# Patient Record
Sex: MALE | Race: WHITE | Employment: UNEMPLOYED | URBAN - METROPOLITAN AREA
[De-identification: names, ages, dates, MRNs, and addresses within clinical notes are randomized per-mention and may not be internally consistent; named-entity substitution may affect disease eponyms.]

---

## 2018-01-19 ENCOUNTER — HOSPITAL ENCOUNTER (OUTPATIENT)
Dept: GENERAL RADIOLOGY | Age: 68
Discharge: HOME | End: 2018-01-19
Payer: COMMERCIAL

## 2018-01-19 DIAGNOSIS — C83.39 DIFFUSE LARGE B-CELL LYMPHOMA OF EXTRANODAL SITE (HCC): ICD-10-CM

## 2018-01-19 DIAGNOSIS — C83.30 DIFFUSE LARGE CELL NON-HODGKIN'S LYMPHOMA (HCC): ICD-10-CM

## 2018-01-19 PROCEDURE — 71046 X-RAY EXAM CHEST 2 VIEWS: CPT

## 2020-10-17 ENCOUNTER — APPOINTMENT (OUTPATIENT)
Dept: GENERAL RADIOLOGY | Age: 70
DRG: 286 | End: 2020-10-17
Attending: EMERGENCY MEDICINE
Payer: MEDICARE

## 2020-10-17 ENCOUNTER — HOSPITAL ENCOUNTER (INPATIENT)
Age: 70
LOS: 3 days | Discharge: HOME | DRG: 286 | End: 2020-10-20
Attending: EMERGENCY MEDICINE | Admitting: INTERNAL MEDICINE
Payer: MEDICARE

## 2020-10-17 DIAGNOSIS — I50.9 CONGESTIVE HEART FAILURE, UNSPECIFIED HF CHRONICITY, UNSPECIFIED HEART FAILURE TYPE (HCC): Primary | ICD-10-CM

## 2020-10-17 DIAGNOSIS — I50.21 ACUTE SYSTOLIC CONGESTIVE HEART FAILURE (HCC): ICD-10-CM

## 2020-10-17 DIAGNOSIS — R06.09 DYSPNEA ON EXERTION: ICD-10-CM

## 2020-10-17 DIAGNOSIS — R06.02 SOB (SHORTNESS OF BREATH): ICD-10-CM

## 2020-10-17 PROBLEM — I48.91 NEW ONSET ATRIAL FIBRILLATION (HCC): Status: ACTIVE | Noted: 2020-10-17

## 2020-10-17 LAB
ALBUMIN SERPL-MCNC: 3.6 G/DL (ref 3.4–5)
ALBUMIN SERPL-MCNC: 3.6 G/DL (ref 3.4–5)
ALBUMIN/GLOB SERPL: 0.9 {RATIO} (ref 0.8–1.7)
ALBUMIN/GLOB SERPL: 0.9 {RATIO} (ref 0.8–1.7)
ALP SERPL-CCNC: 101 U/L (ref 45–117)
ALP SERPL-CCNC: 91 U/L (ref 45–117)
ALT SERPL-CCNC: 27 U/L (ref 16–61)
ALT SERPL-CCNC: 29 U/L (ref 16–61)
ANION GAP SERPL CALC-SCNC: 7 MMOL/L (ref 3–18)
ANION GAP SERPL CALC-SCNC: 7 MMOL/L (ref 3–18)
APTT PPP: 32.6 SEC (ref 23–36.4)
APTT PPP: 39.7 SEC (ref 23–36.4)
AST SERPL-CCNC: 20 U/L (ref 10–38)
AST SERPL-CCNC: 23 U/L (ref 10–38)
ATRIAL RATE: 108 BPM
BASOPHILS # BLD: 0.1 K/UL (ref 0–0.1)
BASOPHILS # BLD: 0.1 K/UL (ref 0–0.1)
BASOPHILS NFR BLD: 1 % (ref 0–2)
BASOPHILS NFR BLD: 1 % (ref 0–2)
BILIRUB SERPL-MCNC: 0.5 MG/DL (ref 0.2–1)
BILIRUB SERPL-MCNC: 1 MG/DL (ref 0.2–1)
BNP SERPL-MCNC: 4266 PG/ML (ref 0–900)
BUN SERPL-MCNC: 16 MG/DL (ref 7–18)
BUN SERPL-MCNC: 17 MG/DL (ref 7–18)
BUN/CREAT SERPL: 13 (ref 12–20)
BUN/CREAT SERPL: 16 (ref 12–20)
CALCIUM SERPL-MCNC: 9 MG/DL (ref 8.5–10.1)
CALCIUM SERPL-MCNC: 9.1 MG/DL (ref 8.5–10.1)
CALCULATED R AXIS, ECG10: -43 DEGREES
CALCULATED T AXIS, ECG11: 99 DEGREES
CHLORIDE SERPL-SCNC: 107 MMOL/L (ref 100–111)
CHLORIDE SERPL-SCNC: 111 MMOL/L (ref 100–111)
CK MB CFR SERPL CALC: 1.7 % (ref 0–4)
CK MB SERPL-MCNC: 1.8 NG/ML (ref 5–25)
CK SERPL-CCNC: 108 U/L (ref 39–308)
CO2 SERPL-SCNC: 23 MMOL/L (ref 21–32)
CO2 SERPL-SCNC: 25 MMOL/L (ref 21–32)
CREAT SERPL-MCNC: 1.07 MG/DL (ref 0.6–1.3)
CREAT SERPL-MCNC: 1.2 MG/DL (ref 0.6–1.3)
D DIMER PPP FEU-MCNC: 0.77 UG/ML(FEU)
DIAGNOSIS, 93000: NORMAL
DIFFERENTIAL METHOD BLD: ABNORMAL
DIFFERENTIAL METHOD BLD: ABNORMAL
EOSINOPHIL # BLD: 0.3 K/UL (ref 0–0.4)
EOSINOPHIL # BLD: 0.5 K/UL (ref 0–0.4)
EOSINOPHIL NFR BLD: 3 % (ref 0–5)
EOSINOPHIL NFR BLD: 4 % (ref 0–5)
ERYTHROCYTE [DISTWIDTH] IN BLOOD BY AUTOMATED COUNT: 14.2 % (ref 11.6–14.5)
ERYTHROCYTE [DISTWIDTH] IN BLOOD BY AUTOMATED COUNT: 14.3 % (ref 11.6–14.5)
FLUAV AG NPH QL IA: NEGATIVE
FLUBV AG NOSE QL IA: NEGATIVE
FOLATE SERPL-MCNC: >20 NG/ML (ref 3.1–17.5)
GLOBULIN SER CALC-MCNC: 4 G/DL (ref 2–4)
GLOBULIN SER CALC-MCNC: 4.1 G/DL (ref 2–4)
GLUCOSE SERPL-MCNC: 101 MG/DL (ref 74–99)
GLUCOSE SERPL-MCNC: 133 MG/DL (ref 74–99)
HCT VFR BLD AUTO: 45.6 % (ref 36–48)
HCT VFR BLD AUTO: 45.9 % (ref 36–48)
HGB BLD-MCNC: 15.8 G/DL (ref 13–16)
HGB BLD-MCNC: 16.2 G/DL (ref 13–16)
LYMPHOCYTES # BLD: 2.2 K/UL (ref 0.9–3.6)
LYMPHOCYTES # BLD: 4.3 K/UL (ref 0.9–3.6)
LYMPHOCYTES NFR BLD: 23 % (ref 21–52)
LYMPHOCYTES NFR BLD: 39 % (ref 21–52)
MAGNESIUM SERPL-MCNC: 1.9 MG/DL (ref 1.6–2.6)
MAGNESIUM SERPL-MCNC: 2 MG/DL (ref 1.6–2.6)
MCH RBC QN AUTO: 31.9 PG (ref 24–34)
MCH RBC QN AUTO: 32.4 PG (ref 24–34)
MCHC RBC AUTO-ENTMCNC: 34.6 G/DL (ref 31–37)
MCHC RBC AUTO-ENTMCNC: 35.3 G/DL (ref 31–37)
MCV RBC AUTO: 91.8 FL (ref 74–97)
MCV RBC AUTO: 92.1 FL (ref 74–97)
MONOCYTES # BLD: 0.6 K/UL (ref 0.05–1.2)
MONOCYTES # BLD: 0.8 K/UL (ref 0.05–1.2)
MONOCYTES NFR BLD: 7 % (ref 3–10)
MONOCYTES NFR BLD: 7 % (ref 3–10)
NEUTS SEG # BLD: 5.4 K/UL (ref 1.8–8)
NEUTS SEG # BLD: 6.4 K/UL (ref 1.8–8)
NEUTS SEG NFR BLD: 49 % (ref 40–73)
NEUTS SEG NFR BLD: 66 % (ref 40–73)
PHOSPHATE SERPL-MCNC: 2.6 MG/DL (ref 2.5–4.9)
PLATELET # BLD AUTO: 204 K/UL (ref 135–420)
PLATELET # BLD AUTO: 208 K/UL (ref 135–420)
PMV BLD AUTO: 11.8 FL (ref 9.2–11.8)
PMV BLD AUTO: 12.3 FL (ref 9.2–11.8)
POTASSIUM SERPL-SCNC: 3.7 MMOL/L (ref 3.5–5.5)
POTASSIUM SERPL-SCNC: 4 MMOL/L (ref 3.5–5.5)
PROCALCITONIN SERPL-MCNC: <0.05 NG/ML
PROT SERPL-MCNC: 7.6 G/DL (ref 6.4–8.2)
PROT SERPL-MCNC: 7.7 G/DL (ref 6.4–8.2)
Q-T INTERVAL, ECG07: 372 MS
QRS DURATION, ECG06: 124 MS
QTC CALCULATION (BEZET), ECG08: 496 MS
RBC # BLD AUTO: 4.95 M/UL (ref 4.7–5.5)
RBC # BLD AUTO: 5 M/UL (ref 4.7–5.5)
SODIUM SERPL-SCNC: 139 MMOL/L (ref 136–145)
SODIUM SERPL-SCNC: 141 MMOL/L (ref 136–145)
T4 FREE SERPL-MCNC: 1 NG/DL (ref 0.7–1.5)
TROPONIN I SERPL-MCNC: <0.02 NG/ML (ref 0–0.04)
TROPONIN I SERPL-MCNC: <0.02 NG/ML (ref 0–0.04)
TSH SERPL DL<=0.05 MIU/L-ACNC: 1.85 UIU/ML (ref 0.36–3.74)
TSH SERPL DL<=0.05 MIU/L-ACNC: 3.28 UIU/ML (ref 0.36–3.74)
VENTRICULAR RATE, ECG03: 107 BPM
VIT B12 SERPL-MCNC: 604 PG/ML (ref 211–911)
WBC # BLD AUTO: 11 K/UL (ref 4.6–13.2)
WBC # BLD AUTO: 9.6 K/UL (ref 4.6–13.2)

## 2020-10-17 PROCEDURE — 74011250637 HC RX REV CODE- 250/637: Performed by: INTERNAL MEDICINE

## 2020-10-17 PROCEDURE — 71045 X-RAY EXAM CHEST 1 VIEW: CPT

## 2020-10-17 PROCEDURE — 84439 ASSAY OF FREE THYROXINE: CPT

## 2020-10-17 PROCEDURE — 80053 COMPREHEN METABOLIC PANEL: CPT

## 2020-10-17 PROCEDURE — 2709999900 HC NON-CHARGEABLE SUPPLY

## 2020-10-17 PROCEDURE — 99222 1ST HOSP IP/OBS MODERATE 55: CPT | Performed by: INTERNAL MEDICINE

## 2020-10-17 PROCEDURE — 84443 ASSAY THYROID STIM HORMONE: CPT

## 2020-10-17 PROCEDURE — 65270000029 HC RM PRIVATE

## 2020-10-17 PROCEDURE — 94762 N-INVAS EAR/PLS OXIMTRY CONT: CPT

## 2020-10-17 PROCEDURE — 83880 ASSAY OF NATRIURETIC PEPTIDE: CPT

## 2020-10-17 PROCEDURE — 84145 PROCALCITONIN (PCT): CPT

## 2020-10-17 PROCEDURE — 85730 THROMBOPLASTIN TIME PARTIAL: CPT

## 2020-10-17 PROCEDURE — 83735 ASSAY OF MAGNESIUM: CPT

## 2020-10-17 PROCEDURE — 74011250636 HC RX REV CODE- 250/636

## 2020-10-17 PROCEDURE — 99284 EMERGENCY DEPT VISIT MOD MDM: CPT

## 2020-10-17 PROCEDURE — 93005 ELECTROCARDIOGRAM TRACING: CPT

## 2020-10-17 PROCEDURE — 99218 HC RM OBSERVATION: CPT

## 2020-10-17 PROCEDURE — 99232 SBSQ HOSP IP/OBS MODERATE 35: CPT | Performed by: HOSPITALIST

## 2020-10-17 PROCEDURE — 74011250637 HC RX REV CODE- 250/637: Performed by: PHYSICIAN ASSISTANT

## 2020-10-17 PROCEDURE — 74011250636 HC RX REV CODE- 250/636: Performed by: PHYSICIAN ASSISTANT

## 2020-10-17 PROCEDURE — 74011250636 HC RX REV CODE- 250/636: Performed by: INTERNAL MEDICINE

## 2020-10-17 PROCEDURE — 82607 VITAMIN B-12: CPT

## 2020-10-17 PROCEDURE — 84100 ASSAY OF PHOSPHORUS: CPT

## 2020-10-17 PROCEDURE — 85379 FIBRIN DEGRADATION QUANT: CPT

## 2020-10-17 PROCEDURE — 96374 THER/PROPH/DIAG INJ IV PUSH: CPT

## 2020-10-17 PROCEDURE — 85025 COMPLETE CBC W/AUTO DIFF WBC: CPT

## 2020-10-17 PROCEDURE — 82550 ASSAY OF CK (CPK): CPT

## 2020-10-17 PROCEDURE — 36415 COLL VENOUS BLD VENIPUNCTURE: CPT

## 2020-10-17 PROCEDURE — 87804 INFLUENZA ASSAY W/OPTIC: CPT

## 2020-10-17 PROCEDURE — 74011250636 HC RX REV CODE- 250/636: Performed by: STUDENT IN AN ORGANIZED HEALTH CARE EDUCATION/TRAINING PROGRAM

## 2020-10-17 RX ORDER — HEPARIN SODIUM 10000 [USP'U]/100ML
9.5-25 INJECTION, SOLUTION INTRAVENOUS
Status: DISCONTINUED | OUTPATIENT
Start: 2020-10-17 | End: 2020-10-19

## 2020-10-17 RX ORDER — FUROSEMIDE 10 MG/ML
80 INJECTION INTRAMUSCULAR; INTRAVENOUS ONCE
Status: COMPLETED | OUTPATIENT
Start: 2020-10-17 | End: 2020-10-17

## 2020-10-17 RX ORDER — POLYETHYLENE GLYCOL 3350 17 G/17G
17 POWDER, FOR SOLUTION ORAL DAILY PRN
Status: DISCONTINUED | OUTPATIENT
Start: 2020-10-17 | End: 2020-10-21 | Stop reason: HOSPADM

## 2020-10-17 RX ORDER — FUROSEMIDE 20 MG/1
20 TABLET ORAL DAILY
Status: DISCONTINUED | OUTPATIENT
Start: 2020-10-17 | End: 2020-10-17

## 2020-10-17 RX ORDER — METOPROLOL TARTRATE 25 MG/1
12.5 TABLET, FILM COATED ORAL EVERY 12 HOURS
Status: DISCONTINUED | OUTPATIENT
Start: 2020-10-17 | End: 2020-10-18

## 2020-10-17 RX ORDER — FUROSEMIDE 10 MG/ML
INJECTION INTRAMUSCULAR; INTRAVENOUS
Status: COMPLETED
Start: 2020-10-17 | End: 2020-10-17

## 2020-10-17 RX ORDER — PROMETHAZINE HYDROCHLORIDE 25 MG/1
12.5 TABLET ORAL
Status: DISCONTINUED | OUTPATIENT
Start: 2020-10-17 | End: 2020-10-17

## 2020-10-17 RX ORDER — HEPARIN SODIUM 1000 [USP'U]/ML
3000 INJECTION, SOLUTION INTRAVENOUS; SUBCUTANEOUS ONCE
Status: COMPLETED | OUTPATIENT
Start: 2020-10-17 | End: 2020-10-17

## 2020-10-17 RX ORDER — FUROSEMIDE 10 MG/ML
20 INJECTION INTRAMUSCULAR; INTRAVENOUS EVERY 12 HOURS
Status: DISCONTINUED | OUTPATIENT
Start: 2020-10-17 | End: 2020-10-18

## 2020-10-17 RX ORDER — ATORVASTATIN CALCIUM 20 MG/1
20 TABLET, FILM COATED ORAL
Status: DISCONTINUED | OUTPATIENT
Start: 2020-10-17 | End: 2020-10-21 | Stop reason: HOSPADM

## 2020-10-17 RX ORDER — FUROSEMIDE 10 MG/ML
40 INJECTION INTRAMUSCULAR; INTRAVENOUS ONCE
Status: COMPLETED | OUTPATIENT
Start: 2020-10-17 | End: 2020-10-17

## 2020-10-17 RX ORDER — SODIUM CHLORIDE 0.9 % (FLUSH) 0.9 %
5-40 SYRINGE (ML) INJECTION AS NEEDED
Status: DISCONTINUED | OUTPATIENT
Start: 2020-10-17 | End: 2020-10-21 | Stop reason: HOSPADM

## 2020-10-17 RX ORDER — SODIUM CHLORIDE 0.9 % (FLUSH) 0.9 %
5-40 SYRINGE (ML) INJECTION EVERY 8 HOURS
Status: DISCONTINUED | OUTPATIENT
Start: 2020-10-17 | End: 2020-10-21 | Stop reason: HOSPADM

## 2020-10-17 RX ORDER — PANTOPRAZOLE SODIUM 40 MG/1
40 TABLET, DELAYED RELEASE ORAL DAILY
Status: DISCONTINUED | OUTPATIENT
Start: 2020-10-17 | End: 2020-10-21 | Stop reason: HOSPADM

## 2020-10-17 RX ORDER — ENOXAPARIN SODIUM 100 MG/ML
40 INJECTION SUBCUTANEOUS DAILY
Status: DISCONTINUED | OUTPATIENT
Start: 2020-10-17 | End: 2020-10-17

## 2020-10-17 RX ORDER — GUAIFENESIN 100 MG/5ML
81 LIQUID (ML) ORAL DAILY
Status: DISCONTINUED | OUTPATIENT
Start: 2020-10-17 | End: 2020-10-21 | Stop reason: HOSPADM

## 2020-10-17 RX ORDER — ONDANSETRON 2 MG/ML
4 INJECTION INTRAMUSCULAR; INTRAVENOUS
Status: DISCONTINUED | OUTPATIENT
Start: 2020-10-17 | End: 2020-10-21 | Stop reason: HOSPADM

## 2020-10-17 RX ADMIN — Medication 10 ML: at 10:08

## 2020-10-17 RX ADMIN — HEPARIN SODIUM 990.85 UNITS/HR: 10000 INJECTION, SOLUTION INTRAVENOUS at 13:34

## 2020-10-17 RX ADMIN — FUROSEMIDE 40 MG: 10 INJECTION, SOLUTION INTRAMUSCULAR; INTRAVENOUS at 03:04

## 2020-10-17 RX ADMIN — METOPROLOL TARTRATE 12.5 MG: 25 TABLET, FILM COATED ORAL at 22:19

## 2020-10-17 RX ADMIN — PANTOPRAZOLE SODIUM 40 MG: 40 TABLET, DELAYED RELEASE ORAL at 10:07

## 2020-10-17 RX ADMIN — FUROSEMIDE 40 MG: 10 INJECTION, SOLUTION INTRAMUSCULAR; INTRAVENOUS at 03:03

## 2020-10-17 RX ADMIN — METOPROLOL TARTRATE 12.5 MG: 25 TABLET, FILM COATED ORAL at 13:20

## 2020-10-17 RX ADMIN — FUROSEMIDE 20 MG: 10 INJECTION, SOLUTION INTRAMUSCULAR; INTRAVENOUS at 13:23

## 2020-10-17 RX ADMIN — ASPIRIN 81 MG CHEWABLE TABLET 81 MG: 81 TABLET CHEWABLE at 10:07

## 2020-10-17 RX ADMIN — HEPARIN SODIUM 3000 UNITS: 1000 INJECTION INTRAVENOUS; SUBCUTANEOUS at 22:22

## 2020-10-17 RX ADMIN — Medication 10 ML: at 22:19

## 2020-10-17 RX ADMIN — FUROSEMIDE 80 MG: 10 INJECTION, SOLUTION INTRAMUSCULAR; INTRAVENOUS at 10:07

## 2020-10-17 RX ADMIN — ENOXAPARIN SODIUM 40 MG: 40 INJECTION SUBCUTANEOUS at 10:07

## 2020-10-17 RX ADMIN — FUROSEMIDE 20 MG: 10 INJECTION, SOLUTION INTRAMUSCULAR; INTRAVENOUS at 22:19

## 2020-10-17 RX ADMIN — Medication 10 ML: at 13:24

## 2020-10-17 RX ADMIN — ATORVASTATIN CALCIUM 20 MG: 20 TABLET, FILM COATED ORAL at 22:19

## 2020-10-17 NOTE — CONSULTS
[facility] - Consult Note    Consultation request by Osmin Lugo MD for advice/opinion related to evaluating New onset atrial fibrillation [facility]) [I48.91]  Acute congestive heart failure ([address] [I50.9]  CHF exacerbation ([address] [I50.9]    Date of  Admission: 10/17/2020 12:31 AM   Primary Care Physician:  None     Assessment:     Patient Active Problem List   Diagnosis Code    Hepatitis C B19.20    Abdominal pain R10.9    Hematemesis K92.0    Anemia D64.9    Lymphoma ([facility] C85.90    Acute congestive heart failure ([address] I50.9    New onset atrial fibrillation ([facility] I48.91    Shortness of breath R06.02    CHF exacerbation ([facility] I50.9     -Acute CHF exacerbation. Unknown ejection fraction at this point. Symptoms improved following diuretics.  -Paroxysmal atrial fibrillation. Patient presented with atrial fibrillation which is likely been ongoing for at least 2 weeks. Unclear if atrial fibrillation precipitated the decompensated heart failure or if there is an ischemic component as well. His rates now appear to be reasonably well controlled. He states his last episode of atrial fibrillation was in 2014 when he had his GI bleed. -Non-Hodgkins lymphoma with gastric tumor. Received CHOP, no radiation and has been in remission. Followed by Dr. Isaiah Francois. -GI bleed, s/p EGD 12/30/13 with active bleeding, unsuccessful epinephrine injections   -s/p mesenteric angiogram with coil embolization to gastric artery 12/30/13   -Profound blood loss anemia with hypotension history in 2014  -Normal EF by echocardiogram 2014  -Hepatitis B/C positive    -No primary cardiologist        Plan:     -Continue to diurese with IV furosemide twice daily  -Will start on heparin and monitor if able to tolerate and watch for bleeding.  Will stop DVT prophylactic lovenox dosing.   -Starting low-dose metoprolol and can increase as needed for rate control.  -Depending on echo results patient may need an ischemic workup this admission.  -If LV function normal and patient does not have any regional wall motion abnormalities, will pursue a pharmacologic nuclear stress test on Monday. -May need to consider elective CANDE and cardioversion prior to discharge if he remains symptomatic to his atrial fibrillation. Okay to continue diet over the weekend but would make n.p.o. after midnight on  f     History of Present Illness: This is a 72 y.o. male admitted for New onset atrial fibrillation ([facility] [I48.91]  Acute congestive heart failure (HCC) [I50.9]  CHF exacerbation ([facility] [I50.9]. Patient complains of:  Patient presented with shortness of breath, orthopnea, chest pain that worsened over the last few weeks. He states this all began about three weeks ago while mowing the lawn where he had to stop at least six times to catch his breath and relieve the chest pressure. He started having more shortness of breath along with orthopnea, which worsened over the last day or two. He also sweat with the initial symptoms. Of note, the patient had been caring for his terminally ill wife who  in August after an 18-month prolonged illness. The patient had been caring for her and explained that it was labor intensive at the last weeks of her life. Cardiac risk factors: dyslipidemia, obesity, sedentary life style, male gender, hypertension, age, former tobacco abuse      Review of Symptoms:  Except as stated above include:  Constitutional:  negative  Respiratory:  negative  Cardiovascular:  negative  Gastrointestinal: negative  Genitourinary:  negative  Musculoskeletal:  Negative  Neurological:  Negative  Dermatological:  Negative  Endocrinological: Negative  Psychological:  Negative    Pertinent items are noted in HPI.      Past Medical History:     Past Medical History:   Diagnosis Date    Cancer ([facility]     Gastrointestinal disorder     Hep C w/o coma, chronic ([facility] 8-10-13    Liver disease current    non-hodgkins lymphoma Social History:     Social History     Socioeconomic History    Marital status:      Spouse name: Not on file    Number of children: Not on file    Years of education: Not on file    Highest education level: Not on file   Tobacco Use    Smoking status: Former Smoker     Packs/day: 2.00     Years: 40.00     Pack years: 80.00     Types: Cigarettes     Last attempt to quit: 10/9/2013     Years since quittin.0    Smokeless tobacco: Never Used   Substance and Sexual Activity    Alcohol use: No    Drug use: No    Sexual activity: Yes     Partners: Female        Family History:     Family History   Problem Relation Age of Onset    Cancer Mother 76        lung cancer    Stroke Mother 76    Diabetes Mother 67    Heart Attack Father 76        cause of death    Heart Attack Paternal Grandfather 72        Medications: Allergies   Allergen Reactions    Codeine Other (comments)     Pain in his abdominal area.     Acetaminophen Other (comments)     Patient states cannot have acetaminophen due to hep C        Current Facility-Administered Medications   Medication Dose Route Frequency    pantoprazole (PROTONIX) tablet 40 mg  40 mg Oral DAILY    aspirin chewable tablet 81 mg  81 mg Oral DAILY    atorvastatin (LIPITOR) tablet 20 mg  20 mg Oral QHS    sodium chloride (NS) flush 5-40 mL  5-40 mL IntraVENous Q8H    sodium chloride (NS) flush 5-40 mL  5-40 mL IntraVENous PRN    polyethylene glycol (MIRALAX) packet 17 g  17 g Oral DAILY PRN    ondansetron (ZOFRAN) injection 4 mg  4 mg IntraVENous Q6H PRN    enoxaparin (LOVENOX) injection 40 mg  40 mg SubCUTAneous DAILY         Physical Exam:     Visit Vitals  /78   Pulse 99   Temp 97.9 °F (36.6 °C)   Resp 15   Ht 5' 10\" (1.778 m)   Wt 104.3 kg (230 lb)   SpO2 97%   BMI 33.00 kg/m²     BP Readings from Last 3 Encounters:   10/17/20 117/78   14 113/64   14 104/61     Pulse Readings from Last 3 Encounters:   10/17/20 99   14 64   05/16/14 66     Wt Readings from Last 3 Encounters:   10/17/20 104.3 kg (230 lb)   07/14/14 91.6 kg (202 lb)   05/16/14 92.7 kg (204 lb 6.4 oz)       General:  alert, cooperative, no distress, appears stated age  Neck:  Nontender, no carotid bruits  Lungs:  clear to auscultation bilaterally  Heart:  irregularly irregular rhythm  Abdomen:  abdomen is soft without significant tenderness, masses, organomegaly or guarding  Extremities:  extremities normal, atraumatic, no cyanosis or edema  Skin: Warm and dry.  no hyperpigmentation, vitiligo, or suspicious lesions  Neuro: alert, oriented x3, affect appropriate, no focal neurological deficits, moves all extremities well, no involuntary movements  Psych: non focal     Data Review:     Recent Labs     10/17/20  1009 10/17/20  0015   WBC 9.6 11.0   HGB 16.2* 15.8   HCT 45.9 45.6    208     Recent Labs     10/17/20  1009 10/17/20  0015    141   K 3.7 4.0    111   CO2 25 23   * 101*   BUN 16 17   CREA 1.20 1.07   CA 9.1 9.0   MG  --  2.0   ALB 3.6 3.6   ALT 27 29       Results for orders placed or performed during the hospital encounter of 10/17/20   EKG, 12 LEAD, INITIAL   Result Value Ref Range    Ventricular Rate 107 BPM    Atrial Rate 108 BPM    QRS Duration 124 ms    Q-T Interval 372 ms    QTC Calculation (Bezet) 496 ms    Calculated R Axis -43 degrees    Calculated T Axis 99 degrees    Diagnosis       Atrial fibrillation with rapid ventricular response  Left axis deviation  Left ventricular hypertrophy with QRS widening  Cannot rule out Septal infarct , age undetermined  Abnormal ECG  When compared with ECG of 05-FEB-2014 08:09,  Significant changes have occurred         All Cardiac Markers in the last 24 hours:    Lab Results   Component Value Date/Time     10/17/2020 12:15 AM    CKMB 1.8 10/17/2020 12:15 AM    CKND1 1.7 10/17/2020 12:15 AM    TROIQ <0.02 10/17/2020 12:15 AM       Last Lipid:  No results found for: CHOL, CHOLX, CHLST, CHOLV, HDL, HDLP, LDL, LDLC, DLDLP, TGLX, TRIGL, TRIGP, CHHD, CHHDX    Signed By: MICHELLE Isabel     October 17, 2020      Nhung Londono MD

## 2020-10-17 NOTE — Clinical Note
TRANSFER - OUT REPORT:     Verbal report given to: Alicia. Report consisted of patient's Situation, Background, Assessment and   Recommendations(SBAR). Opportunity for questions and clarification was provided. Patient transported with a Registered Nurse. Patient transported to: [address]

## 2020-10-17 NOTE — Clinical Note
TRANSFER - IN REPORT:     Verbal report received from: [facility]. Report consisted of patient's Situation, Background, Assessment and   Recommendations(SBAR). Opportunity for questions and clarification was provided. Assessment completed upon patient's arrival to unit and care assumed. Patient transported with a Registered Nurse.

## 2020-10-17 NOTE — ED TRIAGE NOTES
Patient A/O x 4, presented to ED with complaint of chest pressure/tightness, dyspnea on exertion x 2 weeks. Patient denies N/V, abdominal pain. Patient denies cough, fever, body aches, chills.

## 2020-10-17 NOTE — ED PROVIDER NOTES
Ge Martinez is a 26-year-old male with a history of non-Hodgkin's lymphoma in remission since 2014 status post chemotherapy, prior smoker 2ppd/40 years, hepatitis C who presents with dyspnea, chest pain, and cough. He has been experiencing the symptoms for the last 2 weeks. Symptoms are worsened with laying supine. Patient is unable to take several steps now before feeling dyspneic. He says he used to be able to mow his lawn for 45 minutes with no breaks, however a week and half ago he had to take 6 breaks while he was mowing his lawn. Patient says he has to sleep upright at night in order to fall asleep. He says the dyspnea and chest pain will also awaken him at night. Chest pain is located in the center of his chest, is described as tight/pressue and is non-radiating. He denies any prior history of heart failure, cardiac dysrhythmias, blood clots. He also denies history of high blood pressure, high cholesterol, or diabetes.            Past Medical History:   Diagnosis Date    Cancer [facility]     Gastrointestinal disorder     Hep C w/o coma, chronic ([facility] 8-10-13    Liver disease current    non-hodgkins lymphoma       Past Surgical History:   Procedure Laterality Date    ABDOMEN SURGERY PROC UNLISTED  2/2014    65% of stomach removed    HX HEENT      tonssillectomy    HX SPLENECTOMY  2/2014    possibly partial         Family History:   Problem Relation Age of Onset    Cancer Mother 76        lung cancer    Stroke Mother 76    Diabetes Mother 67    Heart Attack Father 76        cause of death    Heart Attack Paternal Grandfather 72       Social History     Socioeconomic History    Marital status:      Spouse name: Not on file    Number of children: Not on file    Years of education: Not on file    Highest education level: Not on file   Occupational History    Not on file   Social Needs    Financial resource strain: Not on file    Food insecurity     Worry: Not on file     Inability: Not on file    Transportation needs     Medical: Not on file     Non-medical: Not on file   Tobacco Use    Smoking status: Former Smoker     Packs/day: 2.00     Years: 40.00     Pack years: 80.00     Types: Cigarettes     Last attempt to quit: 10/9/2013     Years since quittin.0    Smokeless tobacco: Never Used   Substance and Sexual Activity    Alcohol use: No    Drug use: No    Sexual activity: Yes     Partners: Female   Lifestyle    Physical activity     Days per week: Not on file     Minutes per session: Not on file    Stress: Not on file   Relationships    Social connections     Talks on phone: Not on file     Gets together: Not on file     Attends Faith service: Not on file     Active member of club or organization: Not on file     Attends meetings of clubs or organizations: Not on file     Relationship status: Not on file    Intimate partner violence     Fear of current or ex partner: Not on file     Emotionally abused: Not on file     Physically abused: Not on file     Forced sexual activity: Not on file   Other Topics Concern    Not on file   Social History Narrative    Not on file         ALLERGIES: Codeine and Acetaminophen    Review of Systems   Constitutional: Negative for chills and fever. Respiratory: Positive for cough and shortness of breath. Cardiovascular: Positive for chest pain. Negative for leg swelling. Gastrointestinal: Negative for abdominal pain, nausea and vomiting. Genitourinary: Negative for difficulty urinating and dysuria. Neurological: Negative for syncope and weakness. Vitals:    10/17/20 0027 10/17/20 0100 10/17/20 0200   BP: (!) 136/92 129/87 137/82   Pulse: (!) 125 (!) 106 (!) 105   Resp: 20 19 20   Temp: 98.1 °F (36.7 °C)     SpO2: 98% 94% 96%   Weight: 104.3 kg (230 lb)     Height: 5' 10\" (1.778 m)              Physical Exam  Vitals signs reviewed. HENT:      Head: Normocephalic and atraumatic. Neck:      Musculoskeletal: Neck supple. Vascular: JVD present. Cardiovascular:      Rate and Rhythm: Tachycardia present. Rhythm irregular. Pulmonary:      Effort: Pulmonary effort is normal.      Breath sounds: Normal breath sounds. Abdominal:      Palpations: Abdomen is soft. Tenderness: There is no abdominal tenderness. Musculoskeletal:      Right lower leg: No edema. Left lower leg: No edema. Skin:     General: Skin is warm and dry. Psychiatric:         Mood and Affect: Mood normal.         Behavior: Behavior normal.          MDM  Number of Diagnoses or Management Options  Congestive heart failure, unspecified HF chronicity, unspecified heart failure type [facility]):   Dyspnea on exertion:   Diagnosis management comments: Luis Alberto Mcqueen is a 80-year-old male with a history of non-Hodgkin's lymphoma in remission since 2014 status post chemotherapy, prior smoker 2ppd/40 years, hepatitis C who presents with dyspnea, chest pain, and cough. On presentation vital signs are notable for tachycardia with a pulse of 125 and hypertension at 136/92 otherwise within normal limits. Physical exam is notable for a 80-year-old male lying in bed in no acute distress breathing easily. Exam is notable for JVD. Differential diagnosis includes ACS, heart failure, pneumonia, pneumothorax, cardiac dysrhythmia. Will order labs and imaging to evaluate for the above. EKG is notable for atrial fibrillation, no ST elevations. Labs are notable for a BNP of 4200. Troponins are within normal limits. Chest x-ray shows bilateral pleural edema and cardiomegaly in wheat contrast to chest x-ray from 2018. Based on labs and imaging patient likely with new heart failure and atrial fibrillation. Patient's heart rate on the monitor generally between 101-110 with a blood pressure within normal limits.   As patient is stable with atrial fibrillation of unknown length of time and no history of atrial fibrillation will not convert and do not feel I need to rate control at this time. However, patient will certainly need medical management for this condition. As patient is showing signs of normotensive heart failure will give him 40 mg of Lasix. As blood pressure is not over 447 systolic we will not provide any afterload reduction at this time. Patient will require goal-directed medical therapy and furthering testing likely echocardiogram.    I have explained these findings and needed therapies to patient. However, patient is saying that due to a logistics problem with his truck he would like to leave AMA. Talked with patient at 0 240, patient has now agreed to come into the hospital for further management. Hospitalist's paged at [phone]. Discussed with Dr. Ej Lopez with Hospitalist's, he has requested a D-dimer for concern of PE prior to seeing the patient. ED provider echo with reduced EF, dilated LV, dilated IVC, no pericardial effusion, no D-sign.     Patient turned over to Dr. Lesa Huang pending D-dimer and then likely admission to hospital.          Procedures

## 2020-10-17 NOTE — PROGRESS NOTES
Hospitalist Progress Note    Patient: Lien Vasquez Sr. MRN: [id number]  CSN: [id number]    YOB: 1950  Age: 72 y.o. Sex: male    DOA: 10/17/2020 LOS:  LOS: 1 day              Breathing much better than yesterday evening. Majority of edema was in his neck and chest, no LE edema. Had some chest tightness yesterday evening, this has been easing off this morning. He quit tobacco 7 years ago. Denies secondhand smoke exposure. He lost his wife to cancer in August of this year. Resides at home with his sister. No echo in care everywhere. Last echo [facility] 2013, preserved EF. Started on beta-blocker and low-dose Lasix per cardiology. Assessment/Plan       1. CHF, likely diastolic - repeat echo pending. Continue Lasix. Strict I/O, daily weight, follow echo and cardiology recommendations  2. Atrial fibrillation, new onset. TFTs, troponin within normal limits. On beta-blocker, heparin drip. Follow echo and cardiology recommendations. 3. Chest pain. Troponin negative. Echo pending. 4. 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 Bone marrow was negative for disease; however, he had cervical LN at the time of diagnosis. He received 4 cycles of CHOP, complicated by GI bleed, admitted and EGD revealed a large gastric ulcer. patient stabilized and received cycle 5 of CHOP following which he had repeat GI bleed for which he underwent tndoscopy and the left gastric artery was embolized by IR. He received cycle 6 of CHOP and hospitalized again for GI bleed. See #7.   5. 80-pack-year smoking history, quit 7 years ago. 6. hepatitis C  7. S/p ex lap, sleeve gastrectomy, splenectomy, partial omentectomy 2014 for recurrent gastric ulcer bleed, lymphoma.  Findings: large spleen encased in chronic and acute inflammatory mass that extended to splenic flexure, tail and body of pancreas, and greater curvature and fundus of stomach . Significant neovascularity with blood loss during surgery. During this procedure, patient received 6 units of packed red cells, 2 units of FFP, and 10 units of platelets. 8. Obesity Body mass index is 33 kg/m². 9. Poor dentition  10. US core needle Bx of the right parotic gland lesion 2014. Warthins tumor of  salivary gland. 11. DVT prophylaxis  12. Full code. Telemetry. Cardiology input appreciated. Additional Notes:      Case discussed with:  []Patient  []Family  []Nursing  []Case Management  DVT Prophylaxis:  []Lovenox  []Hep SQ  []SCDs  []Coumadin   []On Heparin gtt    Vital signs/Intake and Output:  Visit Vitals  /78   Pulse 99   Temp 97.9 °F (36.6 °C)   Resp 15   Ht 5' 10\" (1.778 m)   Wt 104.3 kg (230 lb)   SpO2 96%   BMI 33.00 kg/m²     Current Shift:  No intake/output data recorded. Last three shifts:  No intake/output data recorded. Awake alert and oriented x4 sitting up in bed no acute distress  Normocephalic atraumatic pupils equally round and reactive to light  Irregularly irregular  Decreased breath sounds at bases  Soft nontender nondistended normoactive bowel sounds  Trace edema bilaterally. DP 2+ bilaterally  No focal deficit  No rash, no lesion      Medications Reviewed      Labs: Results:       Chemistry Recent Labs     10/17/20  0015   *      K 4.0      CO2 23   BUN 17   CREA 1.07   CA 9.0   AGAP 7   BUCR 16      TP 7.6   ALB 3.6   GLOB 4.0   AGRAT 0.9      CBC w/Diff Recent Labs     10/17/20  0015   WBC 11.0   RBC 4.95   HGB 15.8   HCT 45.6      GRANS 49   LYMPH 39   EOS 4      Cardiac Enzymes Recent Labs     10/17/20  0015      CKND1 1.7      Coagulation No results for input(s): PTP, INR, APTT, INREXT in the last 72 hours.     Lipid Panel No results found for: CHOL, CHOLPOCT, CHOLX, CHLST, CHOLV, 490675, HDL, HDLP, LDL, LDLC, DLDLP, 767444, VLDLC, VLDL, TGLX, TRIGL, TRIGP, TGLPOCT, CHHD, CHHDX   BNP No results for input(s): BNPP in the last 72 hours. Liver Enzymes Recent Labs     10/17/20  0015   TP 7.6   ALB 3.6         Thyroid Studies Lab Results   Component Value Date/Time    TSH 3.28 10/17/2020 12:15 AM        Procedures/imaging: see electronic medical records for all procedures/Xrays and details which were not copied into this note but were reviewed prior to creation of Plan.

## 2020-10-17 NOTE — Clinical Note
Right groin and right radial prepped with ChloraPrep and draped. Wet prep elapsed drying time: 3 mins.

## 2020-10-17 NOTE — Clinical Note
Contrast Dose Calculator:   Patient's age: 72.   Patient's sex: Male. Patient weight (kg) = 104.3. Creatinine level (mg/dL) = 1.24. Creatinine clearance (mL/min): 82.94. Contrast concentration (mg/mL) = 300. MACD = 300 mL. Max Contrast dose per Creatinine Cl calculator = 186.63 mL.

## 2020-10-17 NOTE — H&P
GENERAL GENERIC H&P/CONSULT    CC: SOB    Subjective:    61-year-old  male presenting for this of breath. Past medical history of non-Hodgkin's lymphoma in remission since 2014 status post chemotherapy 80-pack-year smoking history hepatitis C. Patient presenting for 2 weeks of symptoms of shortness of breath with associated chest pain and cough. Symptoms worsened when laying supine. Has notably decreased tolerance for exertion. Notes that shortness of breath and chest pain wakes him up at night denies any history of DVT pulmonary embolism or any arrhythmia. Past Medical History:   Diagnosis Date    Cancer [facility])     Gastrointestinal disorder     Hep C w/o coma, chronic ([facility] [address] 8-10-13    Liver disease current    non-hodgkins lymphoma      Past Surgical History:   Procedure Laterality Date    ABDOMEN SURGERY PROC UNLISTED  2/2014    65% of stomach removed    HX HEENT      tonssillectomy    HX SPLENECTOMY  2/2014    possibly partial      Prior to Admission medications    Medication Sig Start Date End Date Taking? Authorizing Provider   HYDROmorphone (DILAUDID) 2 mg tablet Take 1 Tab by mouth every four (4) hours as needed for Pain. Indications: PAIN 2/19/14   Bill Reeves MD   pantoprazole (PROTONIX) 40 mg tablet Take 1 Tab by mouth daily. 2/12/14   Mayra Crawford MD   LORazepam (ATIVAN) 0.5 mg tablet Take 1 Tab by mouth every twelve (12) hours as needed. 2/12/14   Mayra Crawford MD   magnesium oxide (MAG-OX) 400 mg tablet Take 1 Tab by mouth two (2) times a day. 2/12/14   Mayra Crawford MD   OTHER CBC IN 1 WEEK  FAX RESULTS TO DR. Kaykay Gomez AND DR. James Mari 2/12/14   Mayra Crawford MD   promethazine (PHENERGAN) 25 mg tablet Take 25 mg by mouth every six (6) hours as needed. Other, MD Evie     Allergies   Allergen Reactions    Codeine Other (comments)     Pain in his abdominal area.     Acetaminophen Other (comments) Patient states cannot have acetaminophen due to hep C      Social History     Tobacco Use    Smoking status: Former Smoker     Packs/day: 2.00     Years: 40.00     Pack years: 80.00     Types: Cigarettes     Last attempt to quit: 10/9/2013     Years since quittin.0    Smokeless tobacco: Never Used   Substance Use Topics    Alcohol use: No      Family History   Problem Relation Age of Onset    Cancer Mother 76        lung cancer    Stroke Mother 76    Diabetes Mother 67    Heart Attack Father 76        cause of death    Heart Attack Paternal Grandfather 72      Review of Systems   Constitutional: Positive for activity change and fatigue. HENT: Negative for congestion. Eyes: Negative for discharge. Respiratory: Positive for cough, chest tightness and shortness of breath. Cardiovascular: Positive for chest pain. Gastrointestinal: Negative for abdominal distention and abdominal pain. Endocrine: Negative for cold intolerance. Genitourinary: Positive for frequency. Musculoskeletal: Negative for arthralgias. Skin: Negative for color change. Allergic/Immunologic: Negative for environmental allergies. Neurological: Positive for weakness. Negative for dizziness and light-headedness. Hematological: Negative for adenopathy. Psychiatric/Behavioral: Negative for agitation. Objective:    No intake/output data recorded. No intake/output data recorded. Patient Vitals for the past 8 hrs:   BP Temp Pulse Resp SpO2 Height Weight   10/17/20 0600 100/61 -- 97 17 95 % -- --   10/17/20 0400 127/86 -- (!) 115 20 94 % -- --   10/17/20 0315 118/89 -- (!) 106 16 95 % -- --   10/17/20 0200 137/82 -- (!) 105 20 96 % -- --   10/17/20 0100 129/87 -- (!) 106 19 94 % -- --   10/17/20 0027 (!) 136/92 98.1 °F (36.7 °C) (!) 125 20 98 % 5' 10\" (1.778 m) 104.3 kg (230 lb)     Physical Exam  Constitutional:       General: He is in acute distress. HENT:      Head: Normocephalic.       Nose: Nose normal. Mouth/Throat:      Mouth: Mucous membranes are moist.   Eyes:      Pupils: Pupils are equal, round, and reactive to light. Neck:      Comments: Positive for JVD  Cardiovascular:      Rate and Rhythm: Tachycardia present. Pulses: Normal pulses. Pulmonary:      Effort: Respiratory distress present. Abdominal:      General: There is no distension. Tenderness: There is no abdominal tenderness. Genitourinary:     Comments: deferred  Musculoskeletal: Normal range of motion. Skin:     General: Skin is warm. Capillary Refill: Capillary refill takes less than 2 seconds. Neurological:      General: No focal deficit present. Mental Status: He is alert. Psychiatric:         Mood and Affect: Mood normal.          Labs:    Recent Results (from the past 24 hour(s))   CBC WITH AUTOMATED DIFF    Collection Time: 10/17/20 12:15 AM   Result Value Ref Range    WBC 11.0 4.6 - 13.2 K/uL    RBC 4.95 4.70 - 5.50 M/uL    HGB 15.8 13.0 - 16.0 g/dL    HCT 45.6 36.0 - 48.0 %    MCV 92.1 74.0 - 97.0 FL    MCH 31.9 24.0 - 34.0 PG    MCHC 34.6 31.0 - 37.0 g/dL    RDW 14.3 11.6 - 14.5 %    PLATELET 485 964 - 362 K/uL    MPV 12.3 (H) 9.2 - 11.8 FL    NEUTROPHILS 49 40 - 73 %    LYMPHOCYTES 39 21 - 52 %    MONOCYTES 7 3 - 10 %    EOSINOPHILS 4 0 - 5 %    BASOPHILS 1 0 - 2 %    ABS. NEUTROPHILS 5.4 1.8 - 8.0 K/UL    ABS. LYMPHOCYTES 4.3 (H) 0.9 - 3.6 K/UL    ABS. MONOCYTES 0.8 0.05 - 1.2 K/UL    ABS. EOSINOPHILS 0.5 (H) 0.0 - 0.4 K/UL    ABS.  BASOPHILS 0.1 0.0 - 0.1 K/UL    DF AUTOMATED     METABOLIC PANEL, COMPREHENSIVE    Collection Time: 10/17/20 12:15 AM   Result Value Ref Range    Sodium 141 136 - 145 mmol/L    Potassium 4.0 3.5 - 5.5 mmol/L    Chloride 111 100 - 111 mmol/L    CO2 23 21 - 32 mmol/L    Anion gap 7 3.0 - 18 mmol/L    Glucose 101 (H) 74 - 99 mg/dL    BUN 17 7.0 - 18 MG/DL    Creatinine 1.07 0.6 - 1.3 MG/DL    BUN/Creatinine ratio 16 12 - 20      GFR est AA >60 >60 ml/min/1.73m2    GFR est non-AA >60 >60 ml/min/1.73m2    Calcium 9.0 8.5 - 10.1 MG/DL    Bilirubin, total 0.5 0.2 - 1.0 MG/DL    ALT (SGPT) 29 16 - 61 U/L    AST (SGOT) 20 10 - 38 U/L    Alk.  phosphatase 101 45 - 117 U/L    Protein, total 7.6 6.4 - 8.2 g/dL    Albumin 3.6 3.4 - 5.0 g/dL    Globulin 4.0 2.0 - 4.0 g/dL    A-G Ratio 0.9 0.8 - 1.7     MAGNESIUM    Collection Time: 10/17/20 12:15 AM   Result Value Ref Range    Magnesium 2.0 1.6 - 2.6 mg/dL   CARDIAC PANEL,(CK, CKMB & TROPONIN)    Collection Time: 10/17/20 12:15 AM   Result Value Ref Range    CK - MB 1.8 <3.6 ng/ml    CK-MB Index 1.7 0.0 - 4.0 %     39 - 308 U/L    Troponin-I, QT <0.02 0.0 - 0.045 NG/ML   NT-PRO BNP    Collection Time: 10/17/20 12:15 AM   Result Value Ref Range    NT pro-BNP 4,266 (H) 0 - 900 PG/ML   TSH 3RD GENERATION    Collection Time: 10/17/20 12:15 AM   Result Value Ref Range    TSH 3.28 0.36 - 3.74 uIU/mL   EKG, 12 LEAD, INITIAL    Collection Time: 10/17/20 12:46 AM   Result Value Ref Range    Ventricular Rate 107 BPM    Atrial Rate 108 BPM    QRS Duration 124 ms    Q-T Interval 372 ms    QTC Calculation (Bezet) 496 ms    Calculated R Axis -43 degrees    Calculated T Axis 99 degrees    Diagnosis       Atrial fibrillation with rapid ventricular response  Left axis deviation  Left ventricular hypertrophy with QRS widening  Cannot rule out Septal infarct , age undetermined  Abnormal ECG  When compared with ECG of 05-FEB-2014 08:09,  Significant changes have occurred     D DIMER    Collection Time: 10/17/20  3:40 AM   Result Value Ref Range    D DIMER 0.77 (H) <0.46 ug/ml(FEU)       ECG:   Atrial fibrillation with rapid ventricular response   Left axis deviation   Left ventricular hypertrophy with QRS widening   Cannot rule out Septal infarct , age undetermined   Abnormal ECG     Chest X-Ray:  Pending    Assessment:  Principal Problem:    Acute congestive heart failure ([facility] (10/17/2020)    Active Problems:    Lymphoma ([facility] (10/13/2013)      New onset atrial fibrillation ([facility] [address] (10/17/2020)      Shortness of breath (10/17/2020)        Plan:  Echocardiogram  Recheck Troponin  Additional 80mg IV lasix x1  Strict Ins and Outs  Check Flu status  Daily aspirin and atorvastatin--will likely need betablocker and lasix daily    GLOBAL:  Admit to: medical floor with telemetry-observation  Cardiac Diet  DVT PPX: lovenox 40mg qD  Full Code  PT/OT  Tylenol/NSAID for pain  Optional Inpatient Sleep Regimen  Anticipate DC    Signed:  Emanuel Real MD 10/17/2020

## 2020-10-18 ENCOUNTER — APPOINTMENT (OUTPATIENT)
Dept: NON INVASIVE DIAGNOSTICS | Age: 70
DRG: 286 | End: 2020-10-18
Attending: INTERNAL MEDICINE
Payer: MEDICARE

## 2020-10-18 LAB
ANION GAP SERPL CALC-SCNC: 8 MMOL/L (ref 3–18)
APTT PPP: 48 SEC (ref 23–36.4)
APTT PPP: 55 SEC (ref 23–36.4)
APTT PPP: 93 SEC (ref 23–36.4)
ATRIAL RATE: 113 BPM
BASOPHILS # BLD: 0.1 K/UL (ref 0–0.1)
BASOPHILS NFR BLD: 0 % (ref 0–2)
BUN SERPL-MCNC: 26 MG/DL (ref 7–18)
BUN/CREAT SERPL: 19 (ref 12–20)
CALCIUM SERPL-MCNC: 9.8 MG/DL (ref 8.5–10.1)
CALCULATED P AXIS, ECG09: 69 DEGREES
CALCULATED R AXIS, ECG10: 68 DEGREES
CALCULATED T AXIS, ECG11: 69 DEGREES
CHLORIDE SERPL-SCNC: 103 MMOL/L (ref 100–111)
CO2 SERPL-SCNC: 26 MMOL/L (ref 21–32)
CREAT SERPL-MCNC: 1.39 MG/DL (ref 0.6–1.3)
DIAGNOSIS, 93000: NORMAL
DIFFERENTIAL METHOD BLD: ABNORMAL
ECHO AO ROOT DIAM: 3.75 CM
ECHO LA AREA 4C: 20.58 CM2
ECHO LA VOL 2C: 98.73 ML (ref 18–58)
ECHO LA VOL 4C: 59.09 ML (ref 18–58)
ECHO LA VOL BP: 86.5 ML (ref 18–58)
ECHO LA VOL/BSA BIPLANE: 39.05 ML/M2 (ref 16–28)
ECHO LA VOLUME INDEX A2C: 44.57 ML/M2 (ref 16–28)
ECHO LA VOLUME INDEX A4C: 26.68 ML/M2 (ref 16–28)
ECHO LV INTERNAL DIMENSION DIASTOLIC: 5.46 CM (ref 4.2–5.9)
ECHO LV INTERNAL DIMENSION SYSTOLIC: 5.28 CM
ECHO LV IVSD: 1.31 CM (ref 0.6–1)
ECHO LV MASS 2D: 309 G (ref 88–224)
ECHO LV MASS INDEX 2D: 139.5 G/M2 (ref 49–115)
ECHO LV POSTERIOR WALL DIASTOLIC: 1.34 CM (ref 0.6–1)
ECHO LVOT DIAM: 2.68 CM
ECHO RV INTERNAL DIMENSION: 3.65 CM
EOSINOPHIL # BLD: 0.5 K/UL (ref 0–0.4)
EOSINOPHIL NFR BLD: 3 % (ref 0–5)
ERYTHROCYTE [DISTWIDTH] IN BLOOD BY AUTOMATED COUNT: 14.2 % (ref 11.6–14.5)
GLUCOSE SERPL-MCNC: 97 MG/DL (ref 74–99)
HCT VFR BLD AUTO: 49.4 % (ref 36–48)
HGB BLD-MCNC: 17.2 G/DL (ref 13–16)
LYMPHOCYTES # BLD: 4.4 K/UL (ref 0.9–3.6)
LYMPHOCYTES NFR BLD: 33 % (ref 21–52)
MAGNESIUM SERPL-MCNC: 2.2 MG/DL (ref 1.6–2.6)
MCH RBC QN AUTO: 31.9 PG (ref 24–34)
MCHC RBC AUTO-ENTMCNC: 34.8 G/DL (ref 31–37)
MCV RBC AUTO: 91.5 FL (ref 74–97)
MONOCYTES # BLD: 1.2 K/UL (ref 0.05–1.2)
MONOCYTES NFR BLD: 9 % (ref 3–10)
NEUTS SEG # BLD: 7.2 K/UL (ref 1.8–8)
NEUTS SEG NFR BLD: 55 % (ref 40–73)
P-R INTERVAL, ECG05: 144 MS
PHOSPHATE SERPL-MCNC: 3.3 MG/DL (ref 2.5–4.9)
PLATELET # BLD AUTO: 234 K/UL (ref 135–420)
PMV BLD AUTO: 12.6 FL (ref 9.2–11.8)
POTASSIUM SERPL-SCNC: 4.1 MMOL/L (ref 3.5–5.5)
Q-T INTERVAL, ECG07: 330 MS
QRS DURATION, ECG06: 88 MS
QTC CALCULATION (BEZET), ECG08: 452 MS
RBC # BLD AUTO: 5.4 M/UL (ref 4.7–5.5)
SODIUM SERPL-SCNC: 137 MMOL/L (ref 136–145)
VENTRICULAR RATE, ECG03: 113 BPM
WBC # BLD AUTO: 13.4 K/UL (ref 4.6–13.2)

## 2020-10-18 PROCEDURE — 99218 HC RM OBSERVATION: CPT

## 2020-10-18 PROCEDURE — 93306 TTE W/DOPPLER COMPLETE: CPT

## 2020-10-18 PROCEDURE — 36415 COLL VENOUS BLD VENIPUNCTURE: CPT

## 2020-10-18 PROCEDURE — 83735 ASSAY OF MAGNESIUM: CPT

## 2020-10-18 PROCEDURE — 74011250637 HC RX REV CODE- 250/637: Performed by: INTERNAL MEDICINE

## 2020-10-18 PROCEDURE — 97161 PT EVAL LOW COMPLEX 20 MIN: CPT

## 2020-10-18 PROCEDURE — 85730 THROMBOPLASTIN TIME PARTIAL: CPT

## 2020-10-18 PROCEDURE — 85025 COMPLETE CBC W/AUTO DIFF WBC: CPT

## 2020-10-18 PROCEDURE — 65270000029 HC RM PRIVATE

## 2020-10-18 PROCEDURE — 74011250636 HC RX REV CODE- 250/636: Performed by: INTERNAL MEDICINE

## 2020-10-18 PROCEDURE — 80048 BASIC METABOLIC PNL TOTAL CA: CPT

## 2020-10-18 PROCEDURE — 74011250636 HC RX REV CODE- 250/636: Performed by: PHYSICIAN ASSISTANT

## 2020-10-18 PROCEDURE — 99232 SBSQ HOSP IP/OBS MODERATE 35: CPT | Performed by: HOSPITALIST

## 2020-10-18 PROCEDURE — 99233 SBSQ HOSP IP/OBS HIGH 50: CPT | Performed by: PHYSICIAN ASSISTANT

## 2020-10-18 PROCEDURE — 74011250637 HC RX REV CODE- 250/637: Performed by: PHYSICIAN ASSISTANT

## 2020-10-18 PROCEDURE — 84100 ASSAY OF PHOSPHORUS: CPT

## 2020-10-18 RX ORDER — HEPARIN SODIUM 1000 [USP'U]/ML
3000 INJECTION, SOLUTION INTRAVENOUS; SUBCUTANEOUS ONCE
Status: COMPLETED | OUTPATIENT
Start: 2020-10-18 | End: 2020-10-18

## 2020-10-18 RX ORDER — CARVEDILOL 3.12 MG/1
3.12 TABLET ORAL EVERY 12 HOURS
Status: DISCONTINUED | OUTPATIENT
Start: 2020-10-18 | End: 2020-10-21 | Stop reason: HOSPADM

## 2020-10-18 RX ADMIN — CARVEDILOL 3.12 MG: 3.12 TABLET, FILM COATED ORAL at 12:18

## 2020-10-18 RX ADMIN — HEPARIN SODIUM 1391 UNITS/HR: 10000 INJECTION, SOLUTION INTRAVENOUS at 12:15

## 2020-10-18 RX ADMIN — ATORVASTATIN CALCIUM 20 MG: 20 TABLET, FILM COATED ORAL at 22:38

## 2020-10-18 RX ADMIN — Medication 10 ML: at 05:04

## 2020-10-18 RX ADMIN — HEPARIN SODIUM 3000 UNITS: 1000 INJECTION INTRAVENOUS; SUBCUTANEOUS at 13:51

## 2020-10-18 RX ADMIN — HEPARIN SODIUM 3000 UNITS: 1000 INJECTION INTRAVENOUS; SUBCUTANEOUS at 06:39

## 2020-10-18 RX ADMIN — PANTOPRAZOLE SODIUM 40 MG: 40 TABLET, DELAYED RELEASE ORAL at 08:45

## 2020-10-18 RX ADMIN — ASPIRIN 81 MG CHEWABLE TABLET 81 MG: 81 TABLET CHEWABLE at 08:46

## 2020-10-18 RX ADMIN — Medication 10 ML: at 13:17

## 2020-10-18 RX ADMIN — Medication 10 ML: at 22:43

## 2020-10-18 RX ADMIN — CARVEDILOL 3.12 MG: 3.12 TABLET, FILM COATED ORAL at 22:38

## 2020-10-18 NOTE — PROGRESS NOTES
Hospitalist Progress Note    Patient: Clara Dominguez Sr. MRN: [id number]  CSN: [id number]    YOB: 1950  Age: 72 y.o. Sex: male    DOA: 10/17/2020 LOS:  LOS: 1 day              Echo pending. Wbc up to 13.4 today. All cell lines increased. Creatinine up to 1.39 today, BUN up to 26. Preliminary comment from echo is EF <15%, severely and globally reduced systolic function. .    Patient states he feels better, chest tightness has resolved. He denies shortness of breath, orthopnea, dyspnea on exertion. He agrees with cardiac cath as recommended per cardiology. Lasix held. Assessment/Plan       1. Acute systolic CHF, EF less than 15%. On Coreg. Lasix held in anticipation of cardiac cath. N.p.o. except meds after midnight. Cardiology input appreciated. 2. Paroxysmal atrial fibrillation, last episode 2014 when he had GI bleed. TFTs, troponin within normal limits. On beta-blocker, heparin drip. Follow echo and cardiology recommendations. 3. Chest pain. Troponin negative. Echo noted. Cardiac cath anticipated tomorrow. 4. 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 Bone marrow was negative for disease; however, he had cervical LN at the time of diagnosis. He received 4 cycles of CHOP, complicated by GI bleed, admitted and EGD revealed a large gastric ulcer. patient stabilized and received cycle 5 of CHOP following which he had repeat GI bleed for which he underwent tndoscopy and the left gastric artery was embolized by IR. He received cycle 6 of CHOP and hospitalized again for GI bleed. See #7. Follows w/ Dr. Heather Enamorado. 5. [id number] smoking history, quit 7 years ago. 6. hepatitis C  7. S/p ex lap, sleeve gastrectomy, splenectomy, partial omentectomy 2014 for recurrent gastric ulcer bleed, lymphoma.  Findings: large spleen encased in chronic and acute inflammatory mass that extended to splenic flexure, tail and body of pancreas, and greater curvature and fundus of stomach . Significant neovascularity with blood loss during surgery. During this procedure, patient received 6 units of packed red cells, 2 units of FFP, and 10 units of platelets. 8. Obesity Body mass index is 33 kg/m². 9. Poor dentition  10. US core needle Bx of the right parotic gland lesion 2014. Warthins tumor of  salivary gland. 11. DVT prophylaxis  12. Full code. Telemetry. Cardiology input appreciated. Additional Notes:      Case discussed with:  []Patient  []Family  []Nursing  []Case Management  DVT Prophylaxis:  []Lovenox  []Hep SQ  []SCDs  []Coumadin   []On Heparin gtt    Vital signs/Intake and Output:  Visit Vitals  BP 98/62   Pulse (!) 57   Temp 97.7 °F (36.5 °C)   Resp 16   Ht 5' 10\" (1.778 m)   Wt 104.3 kg (230 lb)   SpO2 94%   BMI 33.00 kg/m²     Current Shift:  10/18 0701 - 10/18 1900  In: 240 [P.O.:240]  Out: 1   Last three shifts:  10/16 1901 - 10/18 0700  In: 187.6 [I.V.:187.6]  Out: 1150 [CKOJY:0062]    Awake alert and oriented x4 sitting up  on edge of bed speaking in full sentences on room air. Normocephalic atraumatic pupils equally round and reactive to light  Irregularly irregular  Improved air entry, no crackles. Soft nontender nondistended normoactive bowel sounds  Trace edema bilaterally.   DP 2+ bilaterally  No focal deficit  No rash, no lesion      Medications Reviewed      Labs: Results:       Chemistry Recent Labs     10/18/20  0423 10/17/20  1009 10/17/20  0015   GLU 97 133* 101*    139 141   K 4.1 3.7 4.0    107 111   CO2 26 25 23   BUN 26* 16 17   CREA 1.39* 1.20 1.07   CA 9.8 9.1 9.0   AGAP 8 7 7   BUCR 19 13 16   AP  --  91 101   TP  --  7.7 7.6   ALB  --  3.6 3.6   GLOB  --  4.1* 4.0   AGRAT  --  0.9 0.9      CBC w/Diff Recent Labs     10/18/20  0423 10/17/20  1009 10/17/20  0015   WBC 13.4* 9.6 11.0   RBC 5.40 5.00 4.95   HGB 17.2* 16.2* 15.8   HCT 49.4* 45.9 45.6    204 208   GRANS 55 66 49   LYMPH 33 23 39   EOS 3 3 4      Cardiac Enzymes Recent Labs     10/17/20  0015      CKND1 1.7      Coagulation Recent Labs     10/18/20  0423 10/17/20  2035   APTT 48.0* 39.7*       Lipid Panel No results found for: CHOL, CHOLPOCT, CHOLX, CHLST, CHOLV, 237868, HDL, HDLP, LDL, LDLC, DLDLP, 212265, VLDLC, VLDL, TGLX, TRIGL, TRIGP, TGLPOCT, CHHD, CHHDX   BNP No results for input(s): BNPP in the last 72 hours. Liver Enzymes Recent Labs     10/17/20  1009   TP 7.7   ALB 3.6   AP 91      Thyroid Studies Lab Results   Component Value Date/Time    TSH 1.85 10/17/2020 10:09 AM        Procedures/imaging: see electronic medical records for all procedures/Xrays and details which were not copied into this note but were reviewed prior to creation of Plan.

## 2020-10-18 NOTE — PROGRESS NOTES
1935: Bedside and Verbal shift change report given to Marcus Smith RN (oncoming nurse) by Robin Gary RN (offgoing nurse). Report included the following information SBAR, Kardex, ED Summary, Intake/Output, MAR and Recent Results. Assumed care of patient. Patient resting in bed watching TV. No needs expressed. Heparin drip second checked with Sandip Acevedo RN.     2000: Vitals obtained, VS WNL. No needs expressed. 2215: Heparin drip adjusted per protocol. Second checked by Anitha. 2340: Vital signs obtained, WNL. No needs expressed at this time. 0340: Vital signs obtained. WNL.     2282: Heparin drip adjusted per protocol. 5107: Bedside and Verbal shift change report given to Jewel Carrasco RN (oncoming nurse) by Marcus Smith RN   (offgoing nurse). Report included the following information SBAR, Kardex, ED Summary, Intake/Output, MAR and Recent Results. Patient Seen in: [facility] [address] [place] [address]    History   Patient presents with:  Abdominal Pain    Stated Complaint: stomach pain    HPI    Patient's history was obtained through .   According to the patient's mother is the pat throat there is no tonsillar exudate  Neck is nontender to palpation without adenopathy  Lungs are clear to auscultation  Cardiac there are normal first and second heart sounds  Abdomen is soft bowel sounds are present positive suprapubic tenderness withou

## 2020-10-18 NOTE — ROUTINE PROCESS
Bedside and Verbal shift change report given to Mile Reed (oncoming nurse) by Mark David RN (offgoing nurse). Report included the following information SBAR, Kardex, ED Summary, Procedure Summary, Intake/Output, MAR, Recent Results and Cardiac Rhythm A-fib.

## 2020-10-18 NOTE — PROGRESS NOTES
[facility]  -  Progress Note      Patient: Benjamín Contreras Sr. MRN: [id number]  SSN: [id number]    YOB: 1950  Age: 72 y.o. Sex: male      Admit Date: 10/17/2020    Assessment:     Hospital Problems  Date Reviewed: 2/19/2014          Codes Class Noted POA    * (Principal) Acute congestive heart failure ([facility] ICD-10-CM: I50.9  ICD-9-CM: 428.0  10/17/2020 Unknown        New onset atrial fibrillation ([facility] ICD-10-CM: I48.91  ICD-9-CM: 427.31  10/17/2020 Unknown        Shortness of breath ICD-10-CM: R06.02  ICD-9-CM: 786.05  10/17/2020 Unknown        CHF exacerbation ([facility] ICD-10-CM: I50.9  ICD-9-CM: 428.0  10/17/2020 Unknown        Lymphoma ([facility] ICD-10-CM: C85.90  ICD-9-CM: 202.80  10/13/2013 Yes            -Acute CHF exacerbation. Unknown ejection fraction at this point. Symptoms improved following diuretics. Echo for today  -Paroxysmal atrial fibrillation. Patient presented with atrial fibrillation which is likely been ongoing for at least 2 weeks. Unclear if atrial fibrillation precipitated the decompensated heart failure or if there is an ischemic component as well. His rates now appear to be reasonably well controlled. He states his last episode of atrial fibrillation was in 2014 when he had his GI bleed. -Non-Hodgkins lymphoma with gastric tumor. Received CHOP, no radiation and has been in remission. Followed by Dr. Ritchie Snow. -GI bleed, s/p EGD 12/30/13 with active bleeding, unsuccessful epinephrine injections   -s/p mesenteric angiogram with coil embolization to gastric artery 12/30/13   -Profound blood loss anemia with hypotension history in 2014  -Normal EF by echocardiogram 2014  -Hepatitis B/C positive     -No primary cardiologist    Plan:     Preliminary comment from echo is EF near 10%. His volume status is improving and will give an additional day of IV diuretics. Official reading of echo later today.  Anticipate patient will need cath and will have consent signed and make NPO after midnight. Explained procedure, risks, benefits and patient willing to proceed if needed. All questions answered. Will switch BB to carvedilol and monitor BP, rates  More recommendations pending course. Subjective:     Patient is symptomatically feeling better and breathing easier. No chest pain or other symptoms.     Objective:      Patient Vitals for the past 8 hrs:   Temp Pulse Resp BP SpO2   10/18/20 0859 -- -- -- 98/62 --   10/18/20 0845 97.7 °F (36.5 °C) (!) 57 16 98/62 94 %   10/18/20 0844 -- (!) 57 -- 98/67 --   10/18/20 0341 97.6 °F (36.4 °C) 74 14 93/69 95 %         Patient Vitals for the past 96 hrs:   Weight   10/18/20 0859 104.3 kg (230 lb)   10/18/20 0843 101.7 kg (224 lb 3.2 oz)   10/17/20 0027 104.3 kg (230 lb)         Intake/Output Summary (Last 24 hours) at 10/18/2020 0913  Last data filed at 10/18/2020 0846  Gross per 24 hour   Intake 427.62 ml   Output 1151 ml   Net -723.38 ml       Physical Exam:  General:  alert, cooperative, no distress, appears stated age  Neck:  nontender, no JVD  Lungs:  Minimal crackles at bases without wheeze  Heart:  regular rate and rhythm, S1, S2 normal, no murmur, click, rub or gallop  Abdomen:  abdomen is soft without significant tenderness, masses, organomegaly or guarding  Extremities:  extremities normal, atraumatic, no cyanosis or edema    Data Review:     Labs: Results:       Chemistry Recent Labs     10/18/20  0423 10/17/20  1009 10/17/20  0015   GLU 97 133* 101*    139 141   K 4.1 3.7 4.0    107 111   CO2 26 25 23   BUN 26* 16 17   CREA 1.39* 1.20 1.07   CA 9.8 9.1 9.0   MG 2.2 1.9 2.0   PHOS 3.3 2.6  --    AGAP 8 7 7   BUCR 19 13 16   AP  --  91 101   TP  --  7.7 7.6   ALB  --  3.6 3.6   GLOB  --  4.1* 4.0   AGRAT  --  0.9 0.9      CBC w/Diff Recent Labs     10/18/20  0423 10/17/20  1009 10/17/20  0015   WBC 13.4* 9.6 11.0   RBC 5.40 5.00 4.95   HGB 17.2* 16.2* 15.8   HCT 49.4* 45.9 45.6    204 208   GRANS 55 66 49   LYMPH 33 23 39   EOS 3 3 4      Cardiac Enzymes Lab Results   Component Value Date/Time    TROIQ <0.02 10/17/2020 10:09 AM      Coagulation Recent Labs     10/18/20  0423 10/17/20  2035   APTT 48.0* 39.7*       Lipid Panel No results found for: CHOL, CHOLPOCT, CHOLX, CHLST, CHOLV, 536877, HDL, HDLP, LDL, LDLC, DLDLP, 873642, VLDLC, VLDL, TGLX, TRIGL, TRIGP, TGLPOCT, CHHD, CHHDX   BNP No results found for: BNP, BNPP, XBNPT   Liver Enzymes Recent Labs     10/17/20  1009   TP 7.7   ALB 3.6   AP 91      Digoxin    Thyroid Studies Lab Results   Component Value Date/Time    TSH 1.85 10/17/2020 10:09 AM

## 2020-10-18 NOTE — PROGRESS NOTES
PHYSICAL THERAPY EVALUATION    Patient: Obdulia Christy (76 y.o. male)  Date: 10/18/2020  Primary Diagnosis: New onset atrial fibrillation (HCC) [I48.91]  Acute congestive heart failure (HCC) [I50.9]  CHF exacerbation ([facility] [I50.9]  Precautions: None  WBAT  PLOF: Patient was independent with all mobility, ADLs, and IADLs prior to admission. Lives with his sister in apartment with 3 steps to enter without handrails. ASSESSMENT :  Based on the objective data described below, the patient presents with [facility] strength, range of motion, coordination, sensation, and functional mobility. Patient cleared by nursing for therapy evaluation. Patient presents in supine with HOB elevated and agreeable to therapy evaluation. Patient was independent with all bed mobility and sit<>stand transfers without assistive device. Patient demonstrates intact sitting and standing balance. Patient able to ambulate 220 feet without assistive device and modified independence for safety. He demonstrates a slow, cautious gait with slight increased trunk sway. He ascend/descend 4 stairs without handrail and standby assist for safety. Patient denied shortness of breath or pain throughout therapy evaluation. HR was 112-120 bpm and SpO2 was 98% on room air following mobility. Patient repositioned in bed with call button and phone within reach. Patient does not require further skilled intervention at this level of care. PLAN :  Recommendations and Planned Interventions:   No formal PT needs identified at this time. Discharge Recommendations: None  Further Equipment Recommendations for Discharge: N/A     SUBJECTIVE:   Patient stated I just did 20 squats.     OBJECTIVE DATA SUMMARY:     Past Medical History:   Diagnosis Date    Cancer ([facility]     Gastrointestinal disorder     Hep C w/o coma, chronic ([facility] 8-10-13    Liver disease current    non-hodgkins lymphoma     Past Surgical History:   Procedure Laterality Date    ABDOMEN SURGERY [address] UNLISTED  2/2014    65% of stomach removed    HX HEENT      tonssillectomy    HX SPLENECTOMY  2/2014    possibly partial     Barriers to Learning/Limitations: None  Compensate with: N/A  Home Situation:  Home Situation  Home Environment: Apartment  # Steps to Enter: 3  Rails to Enter: No  One/Two Story Residence: One story  Living Alone: No  Support Systems: Family member(s)  Patient Expects to be Discharged to[de-identified] Private residence  Current DME Used/Available at Home: None  Critical Behavior:  Neurologic State: Alert  Orientation Level: Oriented X4  Cognition: Follows commands; Appropriate safety awareness  Safety/Judgement: Fall prevention  Psychosocial  Patient Behaviors: Calm; Cooperative    Strength:    Strength: Within functional limits    Tone & Sensation:   Sensation: Intact    Range Of Motion:  AROM: Within functional limits  PROM: Within functional limits    Posture:  Posture (WDL): Exceptions to WDL  Posture Assessment: Rounded shoulders  Functional Mobility:  Bed Mobility:  Rolling: Independent  Supine to Sit: Independent  Sit to Supine: Independent  Scooting: Independent    Transfers:  Sit to Stand: Independent  Stand to Sit: Independent     Balance:   Sitting: Intact; Without support  Standing: Intact; Without support    Ambulation/Gait Training:  Distance (ft): 220 Feet (ft)  Ambulation - Level of Assistance: Modified independent  Gait Description (WDL): Exceptions to WDL  Gait Abnormalities: Trunk sway increased  Right Side Weight Bearing: Full  Left Side Weight Bearing: Full  Base of Support: Widened  Speed/Lynn: Slow    Stairs:  Number of Stairs Trained: 4  Stairs - Level of Assistance: Stand-by assistance  Rail Use: None     Therapeutic Exercises:   Patient encouraged to perform seated marches and straight leg raises to maintain LE strength while in the hospital.     Pain:  Pain level pre-treatment: 0/10   Pain level post-treatment: 0/10   Pain Intervention(s) : Medication (see MAR);  Rest, Repositioning  Response to intervention: Nurse notified, See doc flow    Activity Tolerance:   Good   Please refer to the flowsheet for vital signs taken during this treatment. After treatment:   []         Patient left in no apparent distress sitting up in chair  [x]         Patient left in no apparent distress in bed  [x]         Call bell left within reach  [x]         Nursing notified  []         Caregiver present  []         Bed alarm activated  []         SCDs applied    COMMUNICATION/EDUCATION:   [x]         Role of Physical Therapy in the acute care setting. [x]         Fall prevention education was provided and the patient/caregiver indicated understanding. [x]         Patient/family have participated as able in goal setting and plan of care. [x]         Patient/family agree to work toward stated goals and plan of care. []         Patient understands intent and goals of therapy, but is neutral about his/her participation. []         Patient is unable to participate in goal setting/plan of care: ongoing with therapy staff.  []         Other:     Thank you for this referral.  Shaunna Bermudez, PT, DPT   Time Calculation: 14 mins      Eval Complexity: History: MEDIUM  Complexity : 1-2 comorbidities / personal factors will impact the outcome/ POC Exam:LOW Complexity : 1-2 Standardized tests and measures addressing body structure, function, activity limitation and / or participation in recreation  Presentation: LOW Complexity : Stable, uncomplicated  Clinical Decision Making:Low Complexity    Overall Complexity:LOW

## 2020-10-19 PROBLEM — I50.9 CHF (CONGESTIVE HEART FAILURE) (HCC): Status: ACTIVE | Noted: 2020-10-19

## 2020-10-19 LAB
ANION GAP SERPL CALC-SCNC: 7 MMOL/L (ref 3–18)
APTT PPP: 69 SEC (ref 23–36.4)
BUN SERPL-MCNC: 26 MG/DL (ref 7–18)
BUN/CREAT SERPL: 21 (ref 12–20)
CALCIUM SERPL-MCNC: 9.3 MG/DL (ref 8.5–10.1)
CHLORIDE SERPL-SCNC: 108 MMOL/L (ref 100–111)
CO2 SERPL-SCNC: 23 MMOL/L (ref 21–32)
CREAT SERPL-MCNC: 1.24 MG/DL (ref 0.6–1.3)
GLUCOSE SERPL-MCNC: 113 MG/DL (ref 74–99)
INR PPP: 1.1 (ref 0.8–1.2)
POTASSIUM SERPL-SCNC: 3.8 MMOL/L (ref 3.5–5.5)
PROTHROMBIN TIME: 14.2 SEC (ref 11.5–15.2)
SODIUM SERPL-SCNC: 138 MMOL/L (ref 136–145)

## 2020-10-19 PROCEDURE — 74011250637 HC RX REV CODE- 250/637: Performed by: PHYSICIAN ASSISTANT

## 2020-10-19 PROCEDURE — 74011250636 HC RX REV CODE- 250/636: Performed by: PHYSICIAN ASSISTANT

## 2020-10-19 PROCEDURE — 99233 SBSQ HOSP IP/OBS HIGH 50: CPT | Performed by: INTERNAL MEDICINE

## 2020-10-19 PROCEDURE — 99152 MOD SED SAME PHYS/QHP 5/>YRS: CPT | Performed by: INTERNAL MEDICINE

## 2020-10-19 PROCEDURE — 77030019569 HC BND COMPR RAD TERU -B: Performed by: INTERNAL MEDICINE

## 2020-10-19 PROCEDURE — 77030015766: Performed by: INTERNAL MEDICINE

## 2020-10-19 PROCEDURE — 80048 BASIC METABOLIC PNL TOTAL CA: CPT

## 2020-10-19 PROCEDURE — 85610 PROTHROMBIN TIME: CPT

## 2020-10-19 PROCEDURE — 97165 OT EVAL LOW COMPLEX 30 MIN: CPT

## 2020-10-19 PROCEDURE — 99232 SBSQ HOSP IP/OBS MODERATE 35: CPT | Performed by: HOSPITALIST

## 2020-10-19 PROCEDURE — C1894 INTRO/SHEATH, NON-LASER: HCPCS | Performed by: INTERNAL MEDICINE

## 2020-10-19 PROCEDURE — 93458 L HRT ARTERY/VENTRICLE ANGIO: CPT | Performed by: INTERNAL MEDICINE

## 2020-10-19 PROCEDURE — 99153 MOD SED SAME PHYS/QHP EA: CPT | Performed by: INTERNAL MEDICINE

## 2020-10-19 PROCEDURE — 99218 HC RM OBSERVATION: CPT

## 2020-10-19 PROCEDURE — 4A023N7 MEASUREMENT OF CARDIAC SAMPLING AND PRESSURE, LEFT HEART, PERCUTANEOUS APPROACH: ICD-10-PCS | Performed by: INTERNAL MEDICINE

## 2020-10-19 PROCEDURE — 74011250637 HC RX REV CODE- 250/637: Performed by: INTERNAL MEDICINE

## 2020-10-19 PROCEDURE — 65660000000 HC RM CCU STEPDOWN

## 2020-10-19 PROCEDURE — 77030013797 HC KT TRNSDUC PRSSR EDWD -A: Performed by: INTERNAL MEDICINE

## 2020-10-19 PROCEDURE — 74011000636 HC RX REV CODE- 636: Performed by: INTERNAL MEDICINE

## 2020-10-19 PROCEDURE — 85730 THROMBOPLASTIN TIME PARTIAL: CPT

## 2020-10-19 PROCEDURE — 74011000250 HC RX REV CODE- 250: Performed by: INTERNAL MEDICINE

## 2020-10-19 PROCEDURE — 74011250636 HC RX REV CODE- 250/636: Performed by: INTERNAL MEDICINE

## 2020-10-19 PROCEDURE — 36415 COLL VENOUS BLD VENIPUNCTURE: CPT

## 2020-10-19 PROCEDURE — B2111ZZ FLUOROSCOPY OF MULTIPLE CORONARY ARTERIES USING LOW OSMOLAR CONTRAST: ICD-10-PCS | Performed by: INTERNAL MEDICINE

## 2020-10-19 PROCEDURE — B2151ZZ FLUOROSCOPY OF LEFT HEART USING LOW OSMOLAR CONTRAST: ICD-10-PCS | Performed by: INTERNAL MEDICINE

## 2020-10-19 RX ORDER — LIDOCAINE HYDROCHLORIDE 10 MG/ML
INJECTION, SOLUTION EPIDURAL; INFILTRATION; INTRACAUDAL; PERINEURAL AS NEEDED
Status: DISCONTINUED | OUTPATIENT
Start: 2020-10-19 | End: 2020-10-19 | Stop reason: HOSPADM

## 2020-10-19 RX ORDER — HEPARIN SODIUM 10000 [USP'U]/100ML
9.5-25 INJECTION, SOLUTION INTRAVENOUS
Status: DISCONTINUED | OUTPATIENT
Start: 2020-10-19 | End: 2020-10-19

## 2020-10-19 RX ORDER — FENTANYL CITRATE 50 UG/ML
INJECTION, SOLUTION INTRAMUSCULAR; INTRAVENOUS AS NEEDED
Status: DISCONTINUED | OUTPATIENT
Start: 2020-10-19 | End: 2020-10-19 | Stop reason: HOSPADM

## 2020-10-19 RX ORDER — SODIUM CHLORIDE 0.9 % (FLUSH) 0.9 %
5-40 SYRINGE (ML) INJECTION EVERY 8 HOURS
Status: DISCONTINUED | OUTPATIENT
Start: 2020-10-19 | End: 2020-10-21 | Stop reason: HOSPADM

## 2020-10-19 RX ORDER — SODIUM CHLORIDE 0.9 % (FLUSH) 0.9 %
5-40 SYRINGE (ML) INJECTION AS NEEDED
Status: DISCONTINUED | OUTPATIENT
Start: 2020-10-19 | End: 2020-10-21 | Stop reason: HOSPADM

## 2020-10-19 RX ADMIN — APIXABAN 5 MG: 5 TABLET, FILM COATED ORAL at 13:03

## 2020-10-19 RX ADMIN — HEPARIN SODIUM 1591 UNITS/HR: 10000 INJECTION, SOLUTION INTRAVENOUS at 07:53

## 2020-10-19 RX ADMIN — Medication 10 ML: at 05:19

## 2020-10-19 RX ADMIN — Medication 10 ML: at 21:32

## 2020-10-19 RX ADMIN — ATORVASTATIN CALCIUM 20 MG: 20 TABLET, FILM COATED ORAL at 21:28

## 2020-10-19 RX ADMIN — APIXABAN 5 MG: 5 TABLET, FILM COATED ORAL at 17:38

## 2020-10-19 RX ADMIN — Medication 10 ML: at 13:04

## 2020-10-19 RX ADMIN — PANTOPRAZOLE SODIUM 40 MG: 40 TABLET, DELAYED RELEASE ORAL at 08:10

## 2020-10-19 RX ADMIN — Medication 10 ML: at 13:05

## 2020-10-19 RX ADMIN — ASPIRIN 81 MG CHEWABLE TABLET 81 MG: 81 TABLET CHEWABLE at 08:10

## 2020-10-19 RX ADMIN — CARVEDILOL 3.12 MG: 3.12 TABLET, FILM COATED ORAL at 21:28

## 2020-10-19 NOTE — ROUTINE PROCESS
Bedside shift change report given to  [address] [place] [address] (oncoming nurse) by  Shanta Bartlett (offgoing nurse). Report included the following information SBAR, Kardex and Recent Results.

## 2020-10-19 NOTE — PROGRESS NOTES
Problem: Falls - Risk of  Goal: *Absence of Falls  Description: Document Rosa Preston Fall Risk and appropriate interventions in the flowsheet.   Outcome: Progressing Towards Goal  Note: Fall Risk Interventions:            Medication Interventions: Bed/chair exit alarm, Patient to call before getting OOB, Teach patient to arise slowly                   Problem: General Medical Care Plan  Goal: *Vital signs within specified parameters  Outcome: Progressing Towards Goal  Goal: *Labs within defined limits  Outcome: Progressing Towards Goal  Goal: *Absence of infection signs and symptoms  Outcome: Progressing Towards Goal  Goal: *Optimal pain control at patient's stated goal  Outcome: Progressing Towards Goal  Goal: *Skin integrity maintained  Outcome: Progressing Towards Goal  Goal: *Fluid volume balance  Outcome: Progressing Towards Goal  Goal: *Optimize nutritional status  Outcome: Progressing Towards Goal  Goal: *Anxiety reduced or absent  Outcome: Progressing Towards Goal  Goal: *Progressive mobility and function (eg: ADL's)  Outcome: Progressing Towards Goal

## 2020-10-19 NOTE — PROGRESS NOTES
TRANSFER - OUT REPORT:    Verbal report given to Zeus Luciano RN(name) on Niranjan Availendar Sr.  being transferred to Cath Lab(unit) for ordered procedure       Report consisted of patients Situation, Background, Assessment and   Recommendations(SBAR). Information from the following report(s) SBAR, MAR, Recent Results, Med Rec Status, Pre Procedure Checklist, Procedure Verification, Quality Measures and Dual Neuro Assessment was reviewed with the receiving nurse. Lines:   Peripheral IV 10/17/20 Right Hand (Active)   Site Assessment Clean, dry, & intact 10/18/20 2210   Phlebitis Assessment 0 10/18/20 2210   Infiltration Assessment 0 10/18/20 2210   Dressing Status Clean, dry, & intact 10/18/20 2210   Dressing Type Transparent;Tape 10/18/20 2210   Hub Color/Line Status Blue;Flushed 10/18/20 2210        Opportunity for questions and clarification was provided.       Patient transported with:   Registered Nurse

## 2020-10-19 NOTE — ROUTINE PROCESS
TRANSFER - IN REPORT:    Verbal report received from BORIS Michael(name) on [employer] Sr.  being received from Cath Lab(unit) for ordered procedure      Report consisted of patients Situation, Background, Assessment and   Recommendations(SBAR). Information from the following report(s) SBAR, Procedure Summary, MAR and Recent Results was reviewed with the receiving nurse. Opportunity for questions and clarification was provided. Assessment completed upon patients arrival to unit and care assumed.      R Radial TR Band 14cc

## 2020-10-19 NOTE — PROGRESS NOTES
Reason for Admission:  New onset atrial fibrillation ([facility] [I48.91]  Acute congestive heart failure (HCC) [I50.9]  CHF exacerbation (HCC) [I50.9]  CHF (congestive heart failure) ([facility] [I50.9]                 RUR Score:    15%            Plan for utilizing home health:    No, pt is declining                       Likelihood of Readmission:   LOW                         Transition of Care Plan:              Initial assessment completed with patient. Cognitive status of patient: oriented to time, place, person and situation. Face sheet information confirmed:  yes. The patient designates sisterJacek to participate in his discharge plan and to receive any needed information. This patient lives in a single family home with sister. Patient is able to navigate steps as needed. Prior to hospitalization, patient was considered to be independent with ADLs/IADLS : yes . Patient has a current ACP document on file: no  The patient will drive himself home upon discharge. The patient already has Sandeep Kimmy, W/C, BSC, and shower chair available in the home. Patient is not currently active with home health. Patient has not stayed in a skilled nursing facility or rehab. This patient is on dialysis :no    Currently, the discharge plan is Home. The patient states that he can obtain his medications from the pharmacy, and take his medications as directed. Patient's current insurance is [facility] [employer] [facility] [employer] and Medicare A. Spoke with Nii bhat Beaumont and faxed all needed clinicals to him via [employer]. Called Admitting and let them know about his updated insurance information. Care Management Interventions  PCP Verified by CM: No(pt doesnt have a PCP and does not want one. will follow up with cardiology)  Mode of Transport at Discharge: Self(his car is in the parking lot)  Transition of Care Consult (CM Consult): Discharge Planning  Current Support Network:  Other(sister lives with him)  Confirm Follow Up Transport: Self  The Patient and/or Patient Representative was Provided with a Choice of Provider and Agrees with the Discharge Plan?: No  Freedom of Choice List was Provided with Basic Dialogue that Supports the Patient's Individualized Plan of Care/Goals, Treatment Preferences and Shares the Quality Data Associated with the Providers?: No  Discharge Location  Discharge Placement: Home(not interested in home health)        Edyta Austin RN - Outcomes Manager  [phone]

## 2020-10-19 NOTE — PROGRESS NOTES
Cardiac Electrohysiology Progress Note: Seen for A.fib/cardiomyopathy  Admit Date: 10/17/2020    Assessment:     -Acute systolic CHF exacerbation. Improving.  -New nonischemic cardiomyopathy with EF 15% by echo 10/18/20  -s/p heart cath 10/19/20 with 90% ostial ramus, medical therapy  -Paroxysmal atrial fibrillation. Patient presented with atrial fibrillation which is likely been ongoing for at least 2 weeks. Unclear if atrial fibrillation precipitated the decompensated heart failure or if there is an ischemic component as well. - -h/o remote GIB. He states his last episode of atrial fibrillation was in 2014 when he had his GI bleed. -Non-Hodgkins lymphoma with gastric tumor. Received CHOP, no radiation and has been in remission. Followed by Dr. Leti Alicea. -GI bleed, s/p EGD 12/30/13 with active bleeding, unsuccessful epinephrine injections   -s/p mesenteric angiogram with coil embolization to gastric artery 12/30/13   -Profound blood loss anemia with hypotension history in 2014  -Normal EF by echocardiogram 2014  -Hepatitis B/C positive     No primary cardiologist, establishing with Dr. Anaid Vicente:     Cath reviewed today, medical therapy for mild CAD. Plan to arrange lifevest.      Start NOAC later today. Anticipate discharge tomorrow if stable. No ACEI/ARB due to low BP. Follow-up Dr. Tate Begin at discharge. 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 Subjective:     No new complaints.      Objective:      Patient Vitals for the past 8 hrs:   Temp Pulse Resp BP SpO2   10/19/20 1115 -- 94 20 (!) 85/69 99 %   10/19/20 1100 -- 93 16 (!) 86/77 98 %   10/19/20 1045 -- 92 18 99/76 100 %   10/19/20 1030 -- 86 18 (!) 84/65 100 %   10/19/20 1010 -- (!) 101 16 95/76 98 %   10/19/20 0900 -- 90 13 (!) 85/72 98 %   10/19/20 0845 -- 86 11 (!) 81/61 98 %   10/19/20 0830 -- 98 18 90/70 98 %   10/19/20 0819 -- 98 22 (!) 87/74 99 %   10/19/20 0525 98.7 °F (37.1 °C) 78 18 112/78 98 %         Patient Vitals for the past 96 hrs:   Weight   10/18/20 0859 104.3 kg (230 lb)   10/18/20 0843 101.7 kg (224 lb 3.2 oz)   10/17/20 0027 104.3 kg (230 lb)                    Intake/Output Summary (Last 24 hours) at 10/19/2020 1119  Last data filed at 10/19/2020 0027  Gross per 24 hour   Intake 600.94 ml   Output 450 ml   Net 150.94 ml       Physical Exam:  General:  alert, cooperative, no distress, appears stated age  Neck:  nontender  Lungs:  clear to auscultation bilaterally  Heart:  regular rate and rhythm, S1, S2 normal, no murmur, click, rub or gallop  Abdomen:  abdomen is soft without significant tenderness, masses, organomegaly or guarding  Extremities:  extremities normal, atraumatic, no cyanosis or edema    Data Review:     Labs: Results:       Chemistry Recent Labs     10/19/20  0632 10/18/20  0423 10/17/20  1009 10/17/20  0015   * 97 133* 101*    137 139 141   K 3.8 4.1 3.7 4.0    103 107 111   CO2 23 26 25 23   BUN 26* 26* 16 17   CREA 1.24 1.39* 1.20 1.07   CA 9.3 9.8 9.1 9.0   MG  --  2.2 1.9 2.0   PHOS  --  3.3 2.6  --    AGAP 7 8 7 7   BUCR 21* 19 13 16   AP  --   --  91 101   TP  --   --  7.7 7.6   ALB  --   --  3.6 3.6   GLOB  --   --  4.1* 4.0   AGRAT  --   --  0.9 0.9      CBC w/Diff Recent Labs     10/18/20  0423 10/17/20  1009 10/17/20  0015   WBC 13.4* 9.6 11.0   RBC 5.40 5.00 4.95   HGB 17.2* 16.2* 15.8   HCT 49.4* 45.9 45.6    204 208   GRANS 55 66 49   LYMPH 33 23 39   EOS 3 3 4      Cardiac Enzymes No results found for: CPK, CK, CKMMB, CKMB, RCK3, CKMBT, CKNDX, CKND1, FLY, TROPT, TROIQ, SUMIT, TROPT, TNIPOC, BNP, BNPP   Coagulation Recent Labs     10/19/20  0632 10/18/20  2245   PTP 14.2  --    INR 1.1  --    APTT 69.0* 93.0*       Lipid Panel No results found for: CHOL, CHOLPOCT, CHOLX, CHLST, CHOLV, 338857, HDL, HDLP, LDL, LDLC, DLDLP, 187037, VLDLC, VLDL, TGLX, TRIGL, TRIGP, TGLPOCT, CHHD, CHHDX   BNP No results found for: BNP, BNPP, XBNPT   Liver Enzymes Recent Labs 10/17/20  1009   TP 7.7   ALB 3.6   AP 91      Digoxin    Thyroid Studies Lab Results   Component Value Date/Time    TSH 1.85 10/17/2020 10:09 AM          Signed By: Kylah Smith MD     October 19, 2020

## 2020-10-19 NOTE — ACP (ADVANCE CARE PLANNING)
Advance Care Planning     Advance Care Planning Clinical Specialist  Conversation Note      Date of ACP Conversation: 10/17/2020    Conversation Conducted with:   Patient with Decision Making Capacity    ACP Clinical Specialist: Chinyere Parker    *When Decision Maker makes decisions on behalf of the incapacitated patient: Decision Maker is asked to consider and make decisions based on patient values, known preferences, or best interests. Health Care Decision Maker:    Current Designated Health Care Decision Maker:   (If there is a valid [place] named in the [address] [facility] Makers\" box in the ACP activity, but it is not visible above, be sure to open that field and then select the health care decision maker relationship (ie \"primary\") in the blank space to the right of the name.) Validate  this information as still accurate & up-to-date; edit [place] field as needed.)    Note: Assess and validate information in current ACP documents, as indicated. If no Decision Maker listed above or available through scanned documents, then:    If no Authorized Decision Maker has previously been identified, then patient chooses [place]:  \"Who would you like to name as your primary health care decision-maker? \"    Name: Tito Montelongo   Relationship: sister Phone number: [phone]  Wyona Prudent this person be reached easily? \" YES  \"Who would you like to name as your back-up decision maker? \"   Name:  Did not want to list anyone else    Note: If the relationship of these Decision-Makers to the patient does NOT follow your state's Next of Kin hierarchy, recommend that patient complete ACP document that meets state-specific requirements to allow them to act on the patient's behalf when appropriate.     Chinyere Parker RN - Outcomes Manager  [phone]

## 2020-10-19 NOTE — PROGRESS NOTES
TRANSFER - IN REPORT:    Verbal report received from Jesus Coffey RN(name) on [employer] Sr.  being received from [address] [place] [address](unit) for ordered procedure      Report consisted of patients Situation, Background, Assessment and   Recommendations(SBAR). Information from the following report(s) SBAR, Intake/Output, MAR, Recent Results, Med Rec Status, Pre Procedure Checklist, Procedure Verification, Quality Measures and Dual Neuro Assessment was reviewed with the receiving nurse. Opportunity for questions and clarification was provided. Assessment completed upon patients arrival to unit and care assumed.

## 2020-10-19 NOTE — PROGRESS NOTES
Problem: Self Care Deficits Care Plan (Adult)  Goal: *Acute Goals and Plan of Care (Insert Text)  Outcome: Resolved/Met       OCCUPATIONAL THERAPY EVALUATION/DISCHARGE    Patient: Prabhjot Hollins Sr. (76 y.o. male)  Date: 10/19/2020  Primary Diagnosis: New onset atrial fibrillation (HCC) [I48.91]  Acute congestive heart failure (HCC) [I50.9]  CHF exacerbation (HCC) [I50.9]  CHF (congestive heart failure) (HCC) [I50.9]  Procedure(s) (LRB):  LEFT HEART CATH (N/A)  Left Ventriculography (Right)  Coronary Angiography (Right) Day of Surgery   Precautions:  (standard)  PLOF: Patient was independent with self-care and functional mobility PTA. ASSESSMENT AND RECOMMENDATIONS:  Patient cleared to participate in OT evaluation by RN. Upon entering the room, patient was supine in bed, alert, and agreeable to participate in OT evaluation with MD briefly present. Patient educated on the role of OT, evaluation process, and safety during this admission with patient verbalizing understanding. Patient is independent with basic self-care and bed mobility this session and reports he has no concerns with functional mobility in preparation for ADLs. Based on the objective data described below, the patient presents with no deficits that impede pt function with ADLs, functional transfers, and functional mobility. At this time patient is safe to d/c home with family support. OT to d/c from caseload at this time. Skilled occupational therapy is not indicated at this time.   Discharge Recommendations: None  Further Equipment Recommendations for Discharge: N/A      SUBJECTIVE:   Patient stated I can do everything for myself, I just have to take breaks    OBJECTIVE DATA SUMMARY:     Past Medical History:   Diagnosis Date    Cancer ([facility]     Gastrointestinal disorder     Hep C w/o coma, chronic ([facility] 8-10-13    Liver disease current    non-hodgkins lymphoma     Past Surgical History:   Procedure Laterality Date    ABDOMEN SURGERY [address] UNLISTED  2/2014    65% of stomach removed    HX HEENT      tonssillectomy    HX SPLENECTOMY  2/2014    possibly partial     Barriers to Learning/Limitations: None  Compensate with: visual, verbal, tactile, kinesthetic cues/model    Home Situation:   Home Situation  Home Environment: Apartment  # Steps to Enter: 3  Rails to Enter: No  One/Two Story Residence: One story  Living Alone: No  Support Systems: Family member(s)  Patient Expects to be Discharged to[de-identified] Private residence  Current DME Used/Available at Home: None  Tub or Shower Type: Tub/Shower combination  [x]     Right hand dominant   []     Left hand dominant    Cognitive/Behavioral Status:  Neurologic State: Alert  Orientation Level: Oriented X4  Cognition: Follows commands  Safety/Judgement: Fall prevention; Awareness of environment    Skin: Intact  Edema: None noted    Vision/Perceptual:    Acuity: Within Defined Limits      Coordination: BUE  Fine Motor Skills-Upper: Left Intact; Right Intact    Gross Motor Skills-Upper: Left Intact; Right Intact    Balance:  Sitting: Intact    Strength: BUE  Strength: Within functional limits    Tone & Sensation: BUE  Sensation: Intact    Range of Motion: BUE  AROM: Within functional limits    Functional Mobility and Transfers for ADLs:  Bed Mobility:  Rolling: Independent  Supine to Sit: Independent  Sit to Supine: Independent  Scooting: Independent    Transfers:  Sit to Stand: (pt reports he is independent, PT note confirms)      ADL Assessment:  Feeding: Independent    Oral Facial Hygiene/Grooming: Independent    Bathing: Independent    Upper Body Dressing: Independent    Lower Body Dressing: Independent    Toileting: Independent    ADL Intervention:  Lower Body Dressing Assistance  Dressing Assistance: Independent  Socks: Independent  Leg Crossed Method Used: Yes  Position Performed: Seated edge of bed    Cognitive Retraining  Safety/Judgement: Fall prevention; Awareness of environment    Pain:  Pain level pre-treatment: 0/10   Pain level post-treatment: 0/10   Pain Intervention(s): Medication (see MAR); Response to intervention: Nurse notified, See doc flow    Activity Tolerance:   Good    Please refer to the flowsheet for vital signs taken during this treatment. After treatment:   []  Patient left in no apparent distress sitting up in chair  [x]  Patient left in no apparent distress in bed  [x]  Call bell left within reach  [x]  Nursing notified  []  Caregiver present  []  Bed alarm activated    COMMUNICATION/EDUCATION:   [x]      Role of Occupational Therapy in the acute care setting  [x]      Home safety education was provided and the patient/caregiver indicated understanding. [x]      Patient/family have participated as able and agree with findings and recommendations. []      Patient is unable to participate in plan of care at this time. Thank you for this referral.  Mateo Torres OTR/KEATON  Time Calculation: 14 mins      Eval Complexity: History: MEDIUM Complexity : Expanded review of history including physical, cognitive and psychosocial  history ; Examination: LOW Complexity : 1-3 performance deficits relating to physical, cognitive , or psychosocial skils that result in activity limitations and / or participation restrictions ;    Decision Making:LOW Complexity : No comorbidities that affect functional and no verbal or physical assistance needed to complete eval tasks

## 2020-10-19 NOTE — PROGRESS NOTES
Hospitalist Progress Note    Patient: Yordy Craven Sr. MRN: [id number]  CSN: [id number]    YOB: 1950  Age: 72 y.o. Sex: male    DOA: 10/17/2020 LOS:  LOS: 1 day            Patient is status post cath which revealed nonobstructive epicardial coronary arteries, with severely reduced LV function. LVEDP 18 mmHg. Patient found in his room, still on bedrest.  He denies chest pain, states he is breathing much better. He does not have a primary care physician and agrees to have us arrange for him. He agrees to [facility].      Assessment/Plan       1. Acute systolic CHF, EF less than 15%. nonischemic cardiomyopathy. On aspirin, Coreg, Lipitor, Eliquis. Monitor volume status closely. LifeVest to be arranged per cardiology,  made aware. 2. Paroxysmal atrial fibrillation, last episode 2014 when he had GI bleed. TFTs, troponin within normal limits. On beta-blocker, heparin drip. Follow echo and cardiology recommendations. 3. Chest pain likely related to CHF. Status post heparin drip. .  Troponin negative. Echo noted. Cardiac cath 10/19/2020 no obstructive lesions. 4. 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 Bone marrow was negative for disease; however, he had cervical LN at the time of diagnosis. He received 4 cycles of CHOP, complicated by GI bleed, admitted and EGD revealed a large gastric ulcer. patient stabilized and received cycle 5 of CHOP following which he had repeat GI bleed for which he underwent tndoscopy and the left gastric artery was embolized by IR. He received cycle 6 of CHOP and hospitalized again for GI bleed. See #7. Follows w/ Dr. Fredrick Joshua. 5. [id number] smoking history, quit 7 years ago. 6. hepatitis C  7. S/p ex lap, sleeve gastrectomy, splenectomy, partial omentectomy 2014 for recurrent gastric ulcer bleed, lymphoma.  Findings: large spleen encased in chronic and acute inflammatory mass that extended to splenic flexure, tail and body of pancreas, and greater curvature and fundus of stomach . Significant neovascularity with blood loss during surgery. During this procedure, patient received 6 units of packed red cells, 2 units of FFP, and 10 units of platelets. 8. Obesity Body mass index is 33 kg/m². 9. Poor dentition. Patient encouraged to follow-up with dentistry as outpatient. 10. US core needle Bx of the right parotic gland lesion 2014. Warthins tumor of  salivary gland. 11. DVT prophylaxis  12. Full code. Telemetry. Cardiology input appreciated. Anticipate discharge 24 to 48 hours. Needs LifeVest prior to discharge. Additional Notes:      Case discussed with:  [x]Patient  []Family  [x]Nursing  [x]Case Management  DVT Prophylaxis:  []Lovenox  []Hep SQ  []SCDs  []Coumadin   []On Heparin gtt  [x] eliquis      Vital signs/Intake and Output:  Visit Vitals  BP (!) 85/72 (BP 1 Location: Left arm, BP Patient Position: At rest)   Pulse 90   Temp 98.7 °F (37.1 °C)   Resp 13   Ht 5' 10\" (1.778 m)   Wt 104.3 kg (230 lb)   SpO2 98%   BMI 33.00 kg/m²     Current Shift:  No intake/output data recorded. Last three shifts:  10/17 1901 - 10/19 0700  In: 1028.6 [P.O.:660; I.V.:368.6]  Out: 1201 [Urine:1200]    Awake alert and oriented x4 . Lying in bed, on bedrest.  Normocephalic atraumatic pupils equally round and reactive to light  Irregularly irregular  Improved air entry, no crackles. Soft nontender nondistended normoactive bowel sounds  Trace edema bilaterally.   DP 2+ bilaterally  No focal deficit  No rash, no lesion      Medications Reviewed      Labs: Results:       Chemistry Recent Labs     10/19/20  0632 10/18/20  0423 10/17/20  1009 10/17/20  0015   * 97 133* 101*    137 139 141   K 3.8 4.1 3.7 4.0    103 107 111   CO2 23 26 25 23   BUN 26* 26* 16 17   CREA 1.24 1.39* 1.20 1.07   CA 9.3 9.8 9.1 9.0   AGAP 7 8 7 7   BUCR 21* 19 13 16   AP  --   -- 91 101   TP  --   --  7.7 7.6   ALB  --   --  3.6 3.6   GLOB  --   --  4.1* 4.0   AGRAT  --   --  0.9 0.9      CBC w/Diff Recent Labs     10/18/20  0423 10/17/20  1009 10/17/20  0015   WBC 13.4* 9.6 11.0   RBC 5.40 5.00 4.95   HGB 17.2* 16.2* 15.8   HCT 49.4* 45.9 45.6    204 208   GRANS 55 66 49   LYMPH 33 23 39   EOS 3 3 4      Cardiac Enzymes Recent Labs     10/17/20  0015      CKND1 1.7      Coagulation Recent Labs     10/19/20  0632 10/18/20  2245   PTP 14.2  --    INR 1.1  --    APTT 69.0* 93.0*       Lipid Panel No results found for: CHOL, CHOLPOCT, CHOLX, CHLST, CHOLV, 570837, HDL, HDLP, LDL, LDLC, DLDLP, 520870, VLDLC, VLDL, TGLX, TRIGL, TRIGP, TGLPOCT, CHHD, CHHDX   BNP No results for input(s): BNPP in the last 72 hours. Liver Enzymes Recent Labs     10/17/20  1009   TP 7.7   ALB 3.6   AP 91      Thyroid Studies Lab Results   Component Value Date/Time    TSH 1.85 10/17/2020 10:09 AM        Procedures/imaging: see electronic medical records for all procedures/Xrays and details which were not copied into this note but were reviewed prior to creation of Plan.

## 2020-10-19 NOTE — PROGRESS NOTES
OT order received and chart reviewed. Second attempt to see patient for skilled OT evaluation. Patient remains off the unit. Will continue to follow and see patient when available/as appropriate.             Thank you for this referral,   Kaylen Balderas MS, OTR/L

## 2020-10-19 NOTE — PROGRESS NOTES
TRANSFER - OUT REPORT:    Verbal report given to Francisco Leblanc RN(name) on [facility].  being transferred to [address] [place] [address](unit) for routine progression of care       Report consisted of patients Situation, Background, Assessment and   Recommendations(SBAR). Information from the following report(s) SBAR, Procedure Summary, Intake/Output, MAR and Recent Results was reviewed with the receiving nurse. Lines:   Peripheral IV 10/17/20 Right Hand (Active)   Site Assessment Clean, dry, & intact 10/18/20 2210   Phlebitis Assessment 0 10/18/20 2210   Infiltration Assessment 0 10/18/20 2210   Dressing Status Clean, dry, & intact 10/18/20 2210   Dressing Type Transparent;Tape 10/18/20 2210   Hub Color/Line Status Blue;Flushed 10/18/20 2210        Opportunity for questions and clarification was provided.       Patient transported with:   Registered Nurse  Tech     R Radial Quik [employer]ot and tegaderm

## 2020-10-19 NOTE — PROGRESS NOTES
OT order received and chart reviewed. Patient off the unit. Will continue to follow and see patient when available/as appropriate.           Thank you for this referral,   Steven Espinosa MS, OTR/L

## 2020-10-19 NOTE — PROGRESS NOTES
Patient arrived to cath holding. PIV x 1 started. ASA given at 0800 by floor RN. Heparin gtt stopped at 0810. Blood pressure 87/74. Albertdayna Garcia MD notified.

## 2020-10-19 NOTE — ROUTINE PROCESS
Bedside shift change report given to KUSH León (oncoming nurse) by KUSH Read (offgoing nurse). Report included the following information SBAR, Kardex, Intake/Output, MAR, Recent Results and Quality Measures.

## 2020-10-20 VITALS
TEMPERATURE: 97.6 F | WEIGHT: 230.2 LBS | SYSTOLIC BLOOD PRESSURE: 116 MMHG | HEART RATE: 97 BPM | BODY MASS INDEX: 32.96 KG/M2 | RESPIRATION RATE: 21 BRPM | OXYGEN SATURATION: 97 % | DIASTOLIC BLOOD PRESSURE: 78 MMHG | HEIGHT: 70 IN

## 2020-10-20 LAB
ANION GAP SERPL CALC-SCNC: 7 MMOL/L (ref 3–18)
BUN SERPL-MCNC: 25 MG/DL (ref 7–18)
BUN/CREAT SERPL: 20 (ref 12–20)
CALCIUM SERPL-MCNC: 9.5 MG/DL (ref 8.5–10.1)
CHLORIDE SERPL-SCNC: 107 MMOL/L (ref 100–111)
CO2 SERPL-SCNC: 24 MMOL/L (ref 21–32)
CREAT SERPL-MCNC: 1.26 MG/DL (ref 0.6–1.3)
EST. AVERAGE GLUCOSE BLD GHB EST-MCNC: 120 MG/DL
GLUCOSE SERPL-MCNC: 102 MG/DL (ref 74–99)
HBA1C MFR BLD: 5.8 % (ref 4.2–5.6)
MAGNESIUM SERPL-MCNC: 2.3 MG/DL (ref 1.6–2.6)
PHOSPHATE SERPL-MCNC: 2.7 MG/DL (ref 2.5–4.9)
POTASSIUM SERPL-SCNC: 4.2 MMOL/L (ref 3.5–5.5)
SODIUM SERPL-SCNC: 138 MMOL/L (ref 136–145)

## 2020-10-20 PROCEDURE — 99232 SBSQ HOSP IP/OBS MODERATE 35: CPT | Performed by: INTERNAL MEDICINE

## 2020-10-20 PROCEDURE — 74011250637 HC RX REV CODE- 250/637: Performed by: PHYSICIAN ASSISTANT

## 2020-10-20 PROCEDURE — 74011250637 HC RX REV CODE- 250/637: Performed by: INTERNAL MEDICINE

## 2020-10-20 PROCEDURE — 83036 HEMOGLOBIN GLYCOSYLATED A1C: CPT

## 2020-10-20 PROCEDURE — 84100 ASSAY OF PHOSPHORUS: CPT

## 2020-10-20 PROCEDURE — 36415 COLL VENOUS BLD VENIPUNCTURE: CPT

## 2020-10-20 PROCEDURE — 83735 ASSAY OF MAGNESIUM: CPT

## 2020-10-20 PROCEDURE — 99239 HOSP IP/OBS DSCHRG MGMT >30: CPT | Performed by: HOSPITALIST

## 2020-10-20 PROCEDURE — 80048 BASIC METABOLIC PNL TOTAL CA: CPT

## 2020-10-20 RX ORDER — GUAIFENESIN 100 MG/5ML
81 LIQUID (ML) ORAL DAILY
Qty: 30 TAB | Refills: 2 | Status: SHIPPED | OUTPATIENT
Start: 2020-10-21 | End: 2020-12-17

## 2020-10-20 RX ORDER — CARVEDILOL 3.12 MG/1
3.12 TABLET ORAL EVERY 12 HOURS
Qty: 60 TAB | Refills: 2 | Status: SHIPPED | OUTPATIENT
Start: 2020-10-20 | End: 2020-12-17 | Stop reason: SDUPTHER

## 2020-10-20 RX ORDER — ATORVASTATIN CALCIUM 20 MG/1
20 TABLET, FILM COATED ORAL
Qty: 30 TAB | Refills: 2 | Status: SHIPPED | OUTPATIENT
Start: 2020-10-20 | End: 2020-12-17

## 2020-10-20 RX ADMIN — APIXABAN 5 MG: 5 TABLET, FILM COATED ORAL at 08:55

## 2020-10-20 RX ADMIN — Medication 10 ML: at 13:28

## 2020-10-20 RX ADMIN — CARVEDILOL 3.12 MG: 3.12 TABLET, FILM COATED ORAL at 08:55

## 2020-10-20 RX ADMIN — PANTOPRAZOLE SODIUM 40 MG: 40 TABLET, DELAYED RELEASE ORAL at 08:55

## 2020-10-20 RX ADMIN — ASPIRIN 81 MG CHEWABLE TABLET 81 MG: 81 TABLET CHEWABLE at 08:55

## 2020-10-20 RX ADMIN — Medication 10 ML: at 06:42

## 2020-10-20 RX ADMIN — APIXABAN 5 MG: 5 TABLET, FILM COATED ORAL at 18:31

## 2020-10-20 NOTE — DISCHARGE SUMMARY
Discharge Summary    Patient: Anni Mcgill Sr. MRN: [id number]  CSN: [id number]    YOB: 1950  Age: 72 y.o.   Sex: male    DOA: 10/17/2020 LOS:  LOS: 2 days   Discharge Date:      Admission Diagnoses: New onset atrial fibrillation ([facility] [I48.91]  Acute congestive heart failure ([facility] [I50.9]  CHF exacerbation (HCC) [I50.9]  CHF (congestive heart failure) ([facility] [I50.9]    Discharge Diagnoses:    Problem List as of 10/20/2020 Date Reviewed: 2/19/2014          Codes Class Noted - Resolved    CHF (congestive heart failure) ([place] [address] ICD-10-CM: I50.9  ICD-9-CM: 428.0  10/19/2020 - Present        * (Principal) Acute congestive heart failure ([place] [address] ICD-10-CM: I50.9  ICD-9-CM: 428.0  10/17/2020 - Present        New onset atrial fibrillation ([place] [address] ICD-10-CM: I48.91  ICD-9-CM: 427.31  10/17/2020 - Present        Shortness of breath ICD-10-CM: R06.02  ICD-9-CM: 786.05  10/17/2020 - Present        CHF exacerbation ([place] [address] ICD-10-CM: I50.9  ICD-9-CM: 428.0  10/17/2020 - Present        Hematemesis ICD-10-CM: K92.0  ICD-9-CM: 578.0  10/13/2013 - Present        Anemia ICD-10-CM: D64.9  ICD-9-CM: 285.9  10/13/2013 - Present        Lymphoma ([facility] ICD-10-CM: C85.90  ICD-9-CM: 202.80  10/13/2013 - Present        Abdominal pain ICD-10-CM: R10.9  ICD-9-CM: 789.00  8/10/2013 - Present        Hepatitis C ICD-10-CM: B19.20  ICD-9-CM: 070.70  11/5/2012 - Present        RESOLVED: Bacteremia due to Gram-positive bacteria ICD-10-CM: R78.81  ICD-9-CM: 790.7  1/26/2014 - 2/12/2014        RESOLVED: Pancytopenia due to antineoplastic chemotherapy ([facility] ICD-10-CM: D61.810, T45.1X5A  ICD-9-CM: 284.11, E933.1  1/24/2014 - 2/12/2014        RESOLVED: GI bleed ICD-10-CM: K92.2  ICD-9-CM: 578.9  12/29/2013 - 2/12/2014        RESOLVED: Acute blood loss anemia ICD-10-CM: D62  ICD-9-CM: 285.1  12/29/2013 - 2/12/2014              Reason for Admission  52-year-old  male with past medical history of non-Hodgkin's lymphoma in remission since 2014 status post chemotherapy 80-pack-year smoking history hepatitis C who presented to the ED with shortness of breath. Patient endorsed 2 weeks of progressively worsening shortness of breath, with associated chest pain and cough. Symptoms worsened when laying supine. he also noted significantly decreased tolerance for exertion. Shortness of breath and chest pain woke him up at night. Denied hx of DVT, pulmonary embolism, or arrhythmia. In the ED, he was found to have evidence of volume overload, and iv diuretic started.        Discharge Condition: good    PHYSICAL EXAM at discharge  Visit Vitals  /78 (BP 1 Location: Left arm, BP Patient Position: At rest)   Pulse 97   Temp 97.6 °F (36.4 °C)   Resp 21   Ht 5' 10\" (1.778 m)   Wt 104.4 kg (230 lb 3.2 oz)   SpO2 97%   BMI 33.03 kg/m²        Awake alert and oriented x4 . ambulating in room independently and w/o difficulty. Normocephalic atraumatic pupils equally round and reactive to light  Irregularly irregular  Improved air entry, no crackles. Soft nontender nondistended normoactive bowel sounds  Trace edema bilaterally. DP 2+ bilaterally  No focal deficit  No rash, no lesion      Hospital Course:   1. Acute systolic CHF, EF less than 15%. nonischemic cardiomyopathy. On aspirin, Coreg, Lipitor, Eliquis. CHF education done. LifeVest arranged per cardiology prior to discharge. 2. Paroxysmal atrial fibrillation, last episode 2014 when he had GI bleed. TFTs, troponin within normal limits. On beta-blocker, s/p heparin drip, transition to eliquis. 3. Chest pain likely related to CHF. Status post heparin drip. .  Troponin negative. Echo noted. Cardiac cath 10/19/2020 no obstructive lesions. 4. 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 Bone marrow was negative for disease; however, he had cervical LN at the time of diagnosis.  He received 4 cycles of CHOP, complicated by GI bleed, admitted and EGD revealed a large gastric ulcer. patient stabilized and received cycle 5 of CHOP following which he had repeat GI bleed for which he underwent tndoscopy and the left gastric artery was embolized by IR. He received cycle 6 of CHOP and hospitalized again for GI bleed. See #7. Follows w/ Dr. Gunn Plan. 5. [id number] smoking history, quit 7 years ago. 6. hepatitis C  7. S/p ex lap, sleeve gastrectomy, splenectomy, partial omentectomy 2014 for recurrent gastric ulcer bleed, lymphoma. Findings: large spleen encased in chronic and acute inflammatory mass that extended to splenic flexure, tail and body of pancreas, and greater curvature and fundus of stomach .  Significant neovascularity with blood loss during surgery. During this procedure, patient received 6 units of packed red cells, 2 units of FFP, and 10 units of platelets. 8. Obesity Body mass index is 33 kg/m². 9. Poor dentition. Patient encouraged to follow-up with dentistry as outpatient. 10. US core needle Bx of the right parotic gland lesion 2014. Warthins tumor of  salivary gland. 11. DVT prophylaxis was given in the form of heparin gtt, then eliquis  12. Full code. Patient is not homebound and no hhc needs were identified. lifevest was delivered and patient teaching done prior to discharge.        Consults: cardiology Dr. Farhad Gallardo      Procedures  Coronary angiography, left heart cath, left ventriculography Dr Angel Newman 10/19/20. Significant Diagnostic Studies:      Ref.  Range 10/20/2020 04:10   Sodium Latest Ref Range: 136 - 145 mmol/L 138   Potassium Latest Ref Range: 3.5 - 5.5 mmol/L 4.2   Chloride Latest Ref Range: 100 - 111 mmol/L 107   CO2 Latest Ref Range: 21 - 32 mmol/L 24   Anion gap Latest Ref Range: 3.0 - 18 mmol/L 7   Glucose Latest Ref Range: 74 - 99 mg/dL 102 (H)   BUN Latest Ref Range: 7.0 - 18 MG/DL 25 (H)   Creatinine Latest Ref Range: 0.6 - 1.3 MG/DL 1.26   BUN/Creatinine ratio Latest Ref Range: 12 - 20 20   Calcium Latest Ref Range: 8.5 - 10.1 MG/DL 9.5   Phosphorus Latest Ref Range: 2.5 - 4.9 MG/DL 2.7   Magnesium Latest Ref Range: 1.6 - 2.6 mg/dL 2.3   GFR est non-AA Latest Ref Range: >60 ml/min/1.73m2 57 (L)   GFR est AA Latest Ref Range: >60 ml/min/1.73m2 >60   Hemoglobin A1c, (calculated) Latest Ref Range: 4.2 - 5.6 % 5.8 (H)   Est. average glucose Latest Units: mg/dL 120       IMAGING  XR Results (most recent):  Results from Hospital Encounter encounter on 10/17/20   XR CHEST PORT    Narrative EXAM: XR CHEST PORT    CLINICAL INDICATION/HISTORY : chest tightness. COMPARISON: January 29, 2018    TECHNIQUE: 1 VIEWS    FINDINGS:   EKG leads and wires overlie the chest  Mild hazy diffuse pulmonary opacity. .  Mild cardiomegaly. No evidence of pleural effusion. Impression IMPRESSION:    1. Mild cardiomegaly. 2.  Mild hazy diffuse pulmonary opacity may represent atelectasis, edema or  pneumonia.             EKG Results     Procedure 720 Value Units Date/Time    EKG, 12 LEAD, SUBSEQUENT [[id number]] Collected:  10/17/20 2202    Order Status:  Completed Updated:  10/18/20 1610     Ventricular Rate 113 BPM      Atrial Rate 113 BPM      P-R Interval 144 ms      QRS Duration 88 ms      Q-T Interval 330 ms      QTC Calculation (Bezet) 452 ms      Calculated P Axis 69 degrees      Calculated R Axis 68 degrees      Calculated T Axis 69 degrees      Diagnosis --     Sinus tachycardia  Otherwise normal ECG  When compared with ECG of 17-OCT-2020 00:46,  Sinus rhythm has replaced Atrial fibrillation  Minimal criteria for Septal infarct are no longer present  Confirmed by Thanh Garcia MD, [facility] ([id number]) on 10/18/2020 4:10:41 PM      EKG, 12 LEAD, INITIAL [[id number]] Collected:  10/17/20 0046    Order Status:  Completed Updated:  10/17/20 1705     Ventricular Rate 107 BPM      Atrial Rate 108 BPM      QRS Duration 124 ms      Q-T Interval 372 ms      QTC Calculation (Bezet) 496 ms      Calculated R Axis -43 degrees Calculated T Axis 99 degrees      Diagnosis --     Atrial fibrillation with rapid ventricular response  Left axis deviation  Left ventricular hypertrophy with QRS widening  Abnormal ECG  When compared with ECG of 05-FEB-2014 08:09,  Vent. rate has decreased  Confirmed by Bhakti Patel MD, Karen Gallegos ([id number]) on 10/17/2020 5:05:11 PM            Echo 10/18/2020  There is a prior study available for comparison. Prior study date: 9/16/2013. As compared to the previous study, there are significant changes. LV function has decreased. · LV: Estimated LVEF is <15%. Biplane method used to measure ejection fraction. Mildly dilated left ventricle. Mildly increased wall thickness. Severely and globally reduced systolic function. · AO: Mild aortic root dilatation; diameter is 3.7 cm. · AV: Mild aortic valve regurgitation is present. · RA: Moderately dilated right atrium. · LA: Mildly dilated left atrium. Left Atrium volume index is 39 mL/m2. Discharge Medications:     Current Discharge Medication List      START taking these medications    Details   apixaban (ELIQUIS) 5 mg tablet Take 1 Tab by mouth two (2) times a day. Qty: 60 Tab, Refills: 2      aspirin 81 mg chewable tablet Take 1 Tab by mouth daily. Qty: 30 Tab, Refills: 2      atorvastatin (LIPITOR) 20 mg tablet Take 1 Tab by mouth nightly. Qty: 30 Tab, Refills: 2      carvediloL (COREG) 3.125 mg tablet Take 1 Tab by mouth every twelve (12) hours. Qty: 60 Tab, Refills: 2         CONTINUE these medications which have NOT CHANGED    Details   pantoprazole (PROTONIX) 40 mg tablet Take 1 Tab by mouth daily. Qty: 30 Tab, Refills: 1             Activity: as tolerated    Diet: cardiac. FR 1200 mL daily    Wound Care: as directed. Follow-up:   1. Dr. Mateo Olivarez. New patient. Hospital f/u.   2. Dr. Marcy Riley 4 weeks.     Minutes spent on discharge: greater than 30

## 2020-10-20 NOTE — PROGRESS NOTES
Problem: Falls - Risk of  Goal: *Absence of Falls  Description: Document Oleg Hopkins Fall Risk and appropriate interventions in the flowsheet.   Outcome: Progressing Towards Goal  Note: Fall Risk Interventions:            Medication Interventions: Bed/chair exit alarm, Patient to call before getting OOB, Teach patient to arise slowly                   Problem: General Medical Care Plan  Goal: *Vital signs within specified parameters  Outcome: Progressing Towards Goal  Goal: *Labs within defined limits  Outcome: Progressing Towards Goal  Goal: *Absence of infection signs and symptoms  Outcome: Progressing Towards Goal  Goal: *Optimal pain control at patient's stated goal  Outcome: Progressing Towards Goal  Goal: *Skin integrity maintained  Outcome: Progressing Towards Goal  Goal: *Fluid volume balance  Outcome: Progressing Towards Goal  Goal: *Optimize nutritional status  Outcome: Progressing Towards Goal  Goal: *Anxiety reduced or absent  Outcome: Progressing Towards Goal  Goal: *Progressive mobility and function (eg: ADL's)  Outcome: Progressing Towards Goal     Problem: Patient Education: Go to Patient Education Activity  Goal: Patient/Family Education  Outcome: Progressing Towards Goal

## 2020-10-20 NOTE — ROUTINE PROCESS
Bedside shift change report given to Stiven Garg (oncoming nurse) by Narayan Franco RN (offgoing nurse). Report included the following information SBAR, Kardex, Intake/Output, MAR, Recent Results and Quality Measures.

## 2020-10-20 NOTE — PROGRESS NOTES
Spoke with Nii with [facility]. Patients [employer] is denying the auth for the life vest.  Pt also has Medicare A but will not be getting part B for a few weeks. Spoke with Aden MARTINEZ, let her know this. She states pt needs this before he leaves as he is high risk for sudden cardiac death. Messaged Cherise Muhammad to see if the hospital would be able to cover the cost for a month until his Medicare B starts. Spoke with patient, his Medicare is secondary. He will be getting cobra coverage for a month until his Part B starts. 1550- forms for hospital payment of life vest for one month signed by Tanner Barrett and faxed to [facility]. Spoke with [place], he will be on the lookout for the fax and subsequent authorization and will dispatch a nurse to come fit patient for vest.  Called and spoke with patient. Let him know the Life vest would be delivered today (hopefully in the next couple hours) and asked him to please not leave AMA. He assured me he wouldn't and was appreciative of the hospital covering the cost for him.   Kashif Marmolejo RN - Outcomes Manager  [phone]

## 2020-10-20 NOTE — ROUTINE PROCESS
Bedside and Verbal shift change report given to Cary Cruz RN (oncoming nurse) by Melissa Forrest RN (offgoing nurse). Report included the following information SBAR, Kardex, MAR and Recent Results.

## 2020-10-20 NOTE — PROGRESS NOTES
conducted an initial consultation and Spiritual Assessment for Pierce Bates, who is a 72 y.o.,male. Patient's Primary Language is: [place]. According to the patient's EMR Rastafari Affiliation is: Yoshi Ramsey.     The reason the Patient came to the hospital is:   Patient Active Problem List    Diagnosis Date Noted    CHF (congestive heart failure) ([facility] 10/19/2020    Acute congestive heart failure ([address] 10/17/2020    New onset atrial fibrillation ([address] 10/17/2020    Shortness of breath 10/17/2020    CHF exacerbation ([address] 10/17/2020    Hematemesis 10/13/2013    Anemia 10/13/2013    Lymphoma ([facility] 10/13/2013    Abdominal pain 08/10/2013    Hepatitis C 11/05/2012        The  provided the following Interventions:  Introduced and initiated a relationship of care and support. Explored issues of russ, belief, spirituality and Christian/ritual needs while hospitalized. Listened empathically as patient shared about her current health status. Patient seemed very hopeful and expressed how russ is important to him. Patient shared about the lose of his wife. Patient has good support at home. He hopes to be discharged by tomorrow. Provided information about Spiritual Care Services. Offered prayer and assurance of continued prayers on patient's behalf. The following outcomes where achieved:  Patient shared limited information about both their medical narrative and spiritual journey/beliefs. Patient processed feeling about current hospitalization. Patient expressed gratitude for 's visit. Assessment:  Patient does not have any Christian/cultural needs that will affect patient's preferences in health care. Plan:  Chaplains will continue to follow and will provide pastoral care on an as needed/requested basis.       [address] [place] [address]   [phone]

## 2020-10-20 NOTE — PROGRESS NOTES
[facility] - Progress Note  Admit Date: 10/17/2020    Assessment:     -Acute systolic CHF exacerbation.  Improving.  -New nonischemic cardiomyopathy with EF 15% by echo 10/18/20.  -s/p heart cath 10/19/20 with 90% ostial ramus, medical therapy.  -Paroxysmal atrial fibrillation.  Patient presented with atrial fibrillation which is likely been ongoing for at least 2 weeks.  Possibly precipitated the decompensated heart failure -h/o remote GIB. He states his last episode of atrial fibrillation was in 2014 when he had his GI bleed. -Non-Hodgkins lymphoma with gastric tumor. Received CHOP, no radiation and has been in remission. Followed by Dr. Terry Alvarado  -GI bleed, s/p EGD 12/30/13 with active bleeding, unsuccessful epinephrine injections   -s/p mesenteric angiogram with coil embolization to gastric artery 12/30/13   -Profound blood loss anemia with hypotension history in 2014  -Normal EF by echocardiogram 2014  -Hepatitis B/C positive     No primary cardiologist, establishing with Dr. Kathy Meeks:     Samantha Michaels ordered, awaiting insurance approval/ evaluation. Unfortunately [facility] difficult to get approval. Patient recently lost job, possibly transition to medicare soon, but not covered yet. Have requested to see if hospital to cover as manuelito case. Continued on coreg. No ace or arb given low BP. Maintenance diuretics not started given low BP, if unable to tolerate scheduled, would send out with PRN diuretics. Continued on eliquis and ASA. Continued on statin. Discussion about patient with concern for long term DMV, he has now retired, seeking Medicare. He is willing to have permanent AICD if EF doesn't improve. I saw, examined, and evaluated the patient. I personally reviewed the patient's labs, tests, vitals, orders, medications, updated history, and other providers assessments.   I personally agree with the findings as stated and the plan as documented. Subjective:     No new complaints.      Objective:      Patient Vitals for the past 8 hrs:   Temp Pulse Resp BP SpO2   10/20/20 0735 97.4 °F (36.3 °C) (!) 105 19 121/85 100 %   10/20/20 0400 97.4 °F (36.3 °C) 98 18 124/74 98 %         Patient Vitals for the past 96 hrs:   Weight   10/19/20 1240 104.4 kg (230 lb 3.2 oz)   10/18/20 0859 104.3 kg (230 lb)   10/18/20 0843 101.7 kg (224 lb 3.2 oz)   10/17/20 0027 104.3 kg (230 lb)                    Intake/Output Summary (Last 24 hours) at 10/20/2020 0942  Last data filed at 10/20/2020 0900  Gross per 24 hour   Intake 960 ml   Output 600 ml   Net 360 ml       Physical Exam:  General:  alert, cooperative, no distress, appears stated age  Neck:  no JVD  Lungs:  clear to auscultation bilaterally  Heart:  irregular rate and rhythm  Abdomen:  abdomen is soft without significant tenderness, masses, organomegaly or guarding  Extremities:  extremities normal, atraumatic, no cyanosis trace edema    Data Review:     Labs: Results:       Chemistry Recent Labs     10/20/20  0410 10/19/20  0632 10/18/20  0423 10/17/20  1009   * 113* 97 133*    138 137 139   K 4.2 3.8 4.1 3.7    108 103 107   CO2 24 23 26 25   BUN 25* 26* 26* 16   CREA 1.26 1.24 1.39* 1.20   CA 9.5 9.3 9.8 9.1   MG 2.3  --  2.2 1.9   PHOS 2.7  --  3.3 2.6   AGAP 7 7 8 7   BUCR 20 21* 19 13   AP  --   --   --  91   TP  --   --   --  7.7   ALB  --   --   --  3.6   GLOB  --   --   --  4.1*   AGRAT  --   --   --  0.9      CBC w/Diff Recent Labs     10/18/20  0423 10/17/20  1009   WBC 13.4* 9.6   RBC 5.40 5.00   HGB 17.2* 16.2*   HCT 49.4* 45.9    204   GRANS 55 66   LYMPH 33 23   EOS 3 3      Cardiac Enzymes No results found for: CPK, CK, CKMMB, CKMB, RCK3, CKMBT, CKNDX, CKND1, FLY, TROPT, TROIQ, SUMIT, TROPT, TNIPOC, BNP, BNPP   Coagulation Recent Labs     10/19/20  0632 10/18/20  2245   PTP 14.2  --    INR 1.1  --    APTT 69.0* 93.0*       Lipid Panel No results found for: CHOL, CHOLPOCT, CHOLX, CHLST, CHOLV, [id number], HDL, HDLP, LDL, LDLC, DLDLP, 309763, VLDLC, VLDL, TGLX, TRIGL, TRIGP, TGLPOCT, CHHD, CHHDX   BNP No results found for: BNP, BNPP, XBNPT   Liver Enzymes Recent Labs     10/17/20  1009   TP 7.7   ALB 3.6   AP 91      Digoxin    Thyroid Studies Lab Results   Component Value Date/Time    TSH 1.85 10/17/2020 10:09 AM          Signed By: MICHELLE Gallegos     October 20, 2020

## 2020-10-20 NOTE — PROGRESS NOTES
Received call From Yolanda  [place], who indicated that pt will need to sign a form to allow [employer] to appeal the insurance decision of not covering the lifevest. Discussed with pt and is willing to allow [employer] to appeal insurance decision.  Nii puente and RN that will fit pt is on the way to the hospital. REGLA Red, [facility]- [phone]

## 2020-10-20 NOTE — DISCHARGE INSTRUCTIONS
Patient Education        Atrial Fibrillation: Care Instructions  Your Care Instructions     Atrial fibrillation is an irregular and often fast heartbeat. Treating this condition is important for several reasons. It can cause blood clots, which can travel from your heart to your brain and cause a stroke. If you have a fast heartbeat, you may feel lightheaded, dizzy, and weak. An irregular heartbeat can also increase your risk for heart failure. Atrial fibrillation is often the result of another heart condition, such as high blood pressure or coronary artery disease. Making changes to improve your heart condition will help you stay healthy and active. Follow-up care is a key part of your treatment and safety. Be sure to make and go to all appointments, and call your doctor if you are having problems. It's also a good idea to know your test results and keep a list of the medicines you take. How can you care for yourself at home? Medicines    · Take your medicines exactly as prescribed. Call your doctor if you think you are having a problem with your medicine. You will get more details on the specific medicines your doctor prescribes.     · If your doctor has given you a blood thinner to prevent a stroke, be sure you get instructions about how to take your medicine safely. Blood thinners can cause serious bleeding problems.     · Do not take any vitamins, over-the-counter drugs, or herbal products without talking to your doctor first.   Lifestyle changes    · Do not smoke. Smoking can increase your chance of a stroke and heart attack. If you need help quitting, talk to your doctor about stop-smoking programs and medicines. These can increase your chances of quitting for good.     · Eat a heart-healthy diet.     · Stay at a healthy weight. Lose weight if you need to.     · Limit alcohol to 2 drinks a day for men and 1 drink a day for women. Too much alcohol can cause health problems.     · Avoid colds and flu.  Get a pneumococcal vaccine shot. If you have had one before, ask your doctor whether you need another dose. Get a flu shot every year. If you must be around people with colds or flu, wash your hands often. Activity    · If your doctor recommends it, get more exercise. Walking is a good choice. Bit by bit, increase the amount you walk every day. Try for at least 30 minutes on most days of the week. You also may want to swim, bike, or do other activities. Your doctor may suggest that you join a cardiac rehabilitation program so that you can have help increasing your physical activity safely.     · Start light exercise if your doctor says it is okay. Even a small amount will help you get stronger, have more energy, and manage stress. Walking is an easy way to get exercise. Start out by walking a little more than you did in the hospital. Gradually increase the amount you walk.     · When you exercise, watch for signs that your heart is working too hard. You are pushing too hard if you cannot talk while you are exercising. If you become short of breath or dizzy or have chest pain, sit down and rest immediately.     · Check your pulse regularly. Place two fingers on the artery at the palm side of your wrist, in line with your thumb. If your heartbeat seems uneven or fast, talk to your doctor. When should you call for help? Call 911 anytime you think you may need emergency care. For example, call if:    · You have symptoms of a heart attack. These may include:  ? Chest pain or pressure, or a strange feeling in the chest.  ? Sweating. ? Shortness of breath. ? Nausea or vomiting. ? Pain, pressure, or a strange feeling in the back, neck, jaw, or upper belly or in one or both shoulders or arms. ? Lightheadedness or sudden weakness. ? A fast or irregular heartbeat. After you call 911, the  may tell you to chew 1 adult-strength or 2 to 4 low-dose aspirin. Wait for an ambulance.  Do not try to drive yourself.     · You have symptoms of a stroke. These may include:  ? Sudden numbness, tingling, weakness, or loss of movement in your face, arm, or leg, especially on only one side of your body. ? Sudden vision changes. ? Sudden trouble speaking. ? Sudden confusion or trouble understanding simple statements. ? Sudden problems with walking or balance. ? A sudden, severe headache that is different from past headaches.     · You passed out (lost consciousness). Call your doctor now or seek immediate medical care if:    · You have new or increased shortness of breath.     · You feel dizzy or lightheaded, or you feel like you may faint.     · Your heart rate becomes irregular.     · You can feel your heart flutter in your chest or skip heartbeats. Tell your doctor if these symptoms are new or worse. Watch closely for changes in your health, and be sure to contact your doctor if you have any problems. Where can you learn more? Go to http://www.gray.com/  Enter U020 in the search box to learn more about \"Atrial Fibrillation: Care Instructions. \"  Current as of: December 16, 2019               Content Version: 12.6  © 4244-2430 [employer]. Care instructions adapted under license by [employer] (which disclaims liability or warranty for this information). If you have questions about a medical condition or this instruction, always ask your healthcare professional. [address] any warranty or liability for your use of this information. Patient Education        Heart Failure: Care Instructions  Your Care Instructions     Heart failure occurs when your heart does not pump as much blood as the body needs. Failure does not mean that the heart has stopped pumping but rather that it is not pumping as well as it should. Over time, this causes fluid buildup in your lungs and other parts of your body.  Fluid buildup can cause shortness of breath, fatigue, swollen ankles, and other problems. By taking medicines regularly, reducing sodium (salt) in your diet, checking your weight every day, and making lifestyle changes, you can feel better and live longer. Follow-up care is a key part of your treatment and safety. Be sure to make and go to all appointments, and call your doctor if you are having problems. It's also a good idea to know your test results and keep a list of the medicines you take. How can you care for yourself at home? Medicines    · Be safe with medicines. Take your medicines exactly as prescribed. Call your doctor if you think you are having a problem with your medicine.     · Do not take any vitamins, over-the-counter medicine, or herbal products without talking to your doctor first. [place] not take ibuprofen (Advil or Motrin) and naproxen (Aleve) without talking to your doctor first. They could make your heart failure worse.     · You may take some of the following medicine. ? Angiotensin-converting enzyme inhibitors (ACEIs) or angiotensin II receptor blockers (ARBs) reduce the heart's workload, lower blood pressure, and reduce swelling. Taking an ACEI or ARB may lower your chance of needing to be hospitalized. ? Beta-blockers can slow heart rate, decrease blood pressure, and improve your condition. Taking a beta-blocker may lower your chance of needing to be hospitalized. ? Diuretics, also called water pills, reduce swelling. You will get more details on the specific medicines your doctor prescribes. Diet    · Your doctor may suggest that you limit sodium. Your doctor can tell you how much sodium is right for you. An example is less than 3,000 mg a day. This includes all the salt you eat in cooking or in packaged foods. People get most of their sodium from processed foods. Fast food and restaurant meals also tend to be very high in sodium.     · Ask your doctor how much liquid you can drink each day. You may have to limit liquids.    Weight    · Weigh yourself without clothing at the same time each day. Record your weight. Call your doctor if you have a sudden weight gain, such as more than 2 to 3 pounds in a day or 5 pounds in a week. (Your doctor may suggest a different range of weight gain.) A sudden weight gain may mean that your heart failure is getting worse. Activity level    · Start light exercise (if your doctor says it is okay). Even if you can only do a small amount, exercise will help you get stronger, have more energy, and manage your weight and your stress. Walking is an easy way to get exercise. Start out by walking a little more than you did before. Bit by bit, increase the amount you walk.     · When you exercise, watch for signs that your heart is working too hard. You are pushing yourself too hard if you cannot talk while you are exercising. If you become short of breath or dizzy or have chest pain, stop, sit down, and rest.     · If you feel \"wiped out\" the day after you exercise, walk slower or for a shorter distance until you can work up to a better pace.     · Get enough rest at night. Sleeping with 1 or 2 pillows under your upper body and head may help you breathe easier. Lifestyle changes    · Do not smoke. Smoking can make a heart condition worse. If you need help quitting, talk to your doctor about stop-smoking programs and medicines. These can increase your chances of quitting for good. Quitting smoking may be the most important step you can take to protect your heart.     · Limit alcohol to 2 drinks a day for men and 1 drink a day for women. Too much alcohol can cause health problems.     · Avoid getting sick from colds and the flu. Get a pneumococcal vaccine shot. If you have had one before, ask your doctor whether you need another dose. Get a flu shot each year. If you must be around people with colds or the flu, wash your hands often. When should you call for help?    Call 911 if you have symptoms of sudden heart failure such as:    · You have severe trouble breathing.     · You cough up pink, foamy mucus.     · You have a new irregular or rapid heartbeat. Call your doctor now or seek immediate medical care if:    · You have new or increased shortness of breath.     · You are dizzy or lightheaded, or you feel like you may faint.     · You have sudden weight gain, such as more than 2 to 3 pounds in a day or 5 pounds in a week. (Your doctor may suggest a different range of weight gain.)     · You have increased swelling in your legs, ankles, or feet.     · You are suddenly so tired or weak that you cannot do your usual activities. Watch closely for changes in your health, and be sure to contact your doctor if you develop new symptoms. Where can you learn more? Go to http://www.gray.com/  Enter W773 in the search box to learn more about \"Heart Failure: Care Instructions. \"  Current as of: December 16, 2019               Content Version: 12.6  © 0207-5078 [email or web address]. Care instructions adapted under license by [employer] (which disclaims liability or warranty for this information). If you have questions about a medical condition or this instruction, always ask your healthcare professional. [address] any warranty or liability for your use of this information. Patient Education        Learning About Heart Failure Zones  What are heart failure zones? Heart failure zones give you an easy way to see changes in your heart failure symptoms. They also tell you when you need to get help. Check every day to see which zone you are in. Green zone. You are doing well. This is where you want to be. · Your weight is stable. It's not going up or down. · You breathe easily. · You are sleeping well. You are able to lie flat without shortness of breath. · You can do your usual activities. Yellow zone. Be careful. Your symptoms are changing.  Call your doctor. · You have new or increased shortness of breath. · You are dizzy or lightheaded, or you feel like you may faint. · You have sudden weight gain, such as more than 2 to 3 pounds in a day or 5 pounds in a week. (Your doctor may suggest a different range of weight gain.)  · You have increased swelling in your legs, ankles, or feet. · You are so tired or weak that you can't do your usual activities. · You are not sleeping well. Shortness of breath wakes you up at night. You need extra pillows. Red zone. This is an emergency. Call 911. You have symptoms of sudden heart failure. For example:  · You have severe trouble breathing. · You cough up pink, foamy mucus. · You have a new irregular or fast heartbeat. You have symptoms of a heart attack. These may include:  · Chest pain or pressure, or a strange feeling in the chest.  · Sweating. · Shortness of breath. · Nausea or vomiting. · Pain, pressure, or a strange feeling in the back, neck, jaw, or upper belly or in one or both shoulders or arms. · Lightheadedness or sudden weakness. · A fast or irregular heartbeat. If you have symptoms of a heart attack: After you call 911, the  may tell you to chew 1 adult-strength or 2 to 4 low-dose aspirin. Wait for an ambulance. Do not try to drive yourself. Follow-up care is a key part of your treatment and safety. Be sure to make and go to all appointments, and call your doctor if you are having problems. It's also a good idea to know your test results and keep a list of the medicines you take. Where can you learn more? Go to http://www.gray.com/  Enter T174 in the search box to learn more about \"Learning About Heart Failure Zones. \"  Current as of: December 16, 2019               Content Version: 12.6  © 0955-4510 [employer]. Care instructions adapted under license by [employer] (which disclaims liability or warranty for this information).  If you have questions about a medical condition or this instruction, always ask your healthcare professional. [address] any warranty or liability for your use of this information. Patient Education        Avoiding Triggers With Heart Failure: Care Instructions  Your Care Instructions     Triggers are anything that make your heart failure flare up. A flare-up is also called \"sudden heart failure\" or \"acute heart failure. \" When you have a flare-up, fluid builds up in your lungs, and you have problems breathing. You might need to go to the hospital. By watching for changes in your condition and avoiding triggers, you can prevent heart failure flare-ups. Follow-up care is a key part of your treatment and safety. Be sure to make and go to all appointments, and call your doctor if you are having problems. It's also a good idea to know your test results and keep a list of the medicines you take. How can you care for yourself at home? Watch for changes in your weight and condition  · Weigh yourself without clothing at the same time each day. Record your weight. Call your doctor if you have sudden weight gain, such as more than 2 to 3 pounds in a day or 5 pounds in a week. (Your doctor may suggest a different range of weight gain.) A sudden weight gain may mean that your heart failure is getting worse. · Keep a daily record of your symptoms. Write down any changes in how you feel, such as new shortness of breath, cough, or problems eating. Also record if your ankles are more swollen than usual and if you feel more tired than usual. Note anything that you ate or did that could have triggered these changes. Limit sodium  Sodium causes your body to hold on to extra water. This may cause your heart failure symptoms to get worse. People get most of their sodium from processed foods. Fast food and restaurant meals also tend to be very high in sodium.   · Your doctor may suggest that you limit sodium. Your doctor can tell you how much sodium is right for you. This includes limiting sodium in cooked and packaged foods. · Read food labels on cans and food packages. They tell you how much sodium you get in one serving. Check the serving size. If you eat more than one serving, you are getting more sodium. · Be aware that sodium can come in forms other than salt, including monosodium glutamate (MSG), sodium citrate, and sodium bicarbonate (baking soda). MSG is often added to Asian food. You can sometimes ask for food without MSG or salt. · Slowly reducing salt will help you adjust to the taste. Take the salt shaker off the table. · Flavor your food with garlic, lemon juice, onion, vinegar, herbs, and spices instead of salt. Do not use soy sauce, steak sauce, onion salt, garlic salt, mustard, or ketchup on your food, unless it is labeled \"low-sodium\" or \"low-salt. \"  · Make your own salad dressings, sauces, and ketchup without adding salt. · Use fresh or frozen ingredients, instead of canned ones, whenever you can. Choose low-sodium canned goods. · Eat less processed food and food from restaurants, including fast food. Exercise as directed  Moderate, regular exercise is very good for your heart. It improves your blood flow and helps control your weight. But too much exercise can stress your heart and cause a heart failure flare-up. · Check with your doctor before you start an exercise program.  · Walking is an easy way to get exercise. Start out slowly. Gradually increase the length and pace of your walk. Swimming, riding a bike, and using a treadmill are also good forms of exercise. · When you exercise, watch for signs that your heart is working too hard. You are pushing yourself too hard if you cannot talk while you are exercising. If you become short of breath or dizzy or have chest pain, stop, sit down, and rest.  · Do not exercise when you do not feel well.   Take medicines correctly  · Take your medicines exactly as prescribed. Call your doctor if you think you are having a problem with your medicine. · Make a list of all the medicines you take. Include those prescribed to you by other doctors and any over-the-counter medicines, vitamins, or supplements you take. Take this list with you when you go to any doctor. · Take your medicines at the same time every day. It may help you to post a list of all the medicines you take every day and what time of day you take them. · Make taking your medicine as simple as you can. Plan times to take your medicines when you are doing other things, such as eating a meal or getting ready for bed. This will make it easier to remember to take your medicines. · Get organized. Use helpful tools, such as daily or weekly pill containers. When should you call for help? Call 911 if you have symptoms of sudden heart failure such as:    · You have severe trouble breathing.     · You cough up pink, foamy mucus.     · You have a new irregular or rapid heartbeat. Call your doctor now or seek immediate medical care if:    · You have new or increased shortness of breath.     · You are dizzy or lightheaded, or you feel like you may faint.     · You have sudden weight gain, such as more than 2 to 3 pounds in a day or 5 pounds in a week. (Your doctor may suggest a different range of weight gain.)     · You have increased swelling in your legs, ankles, or feet.     · You are suddenly so tired or weak that you cannot do your usual activities. Watch closely for changes in your health, and be sure to contact your doctor if you develop new symptoms. Where can you learn more? Go to http://www.gray.com/  Enter V089 in the search box to learn more about \"Avoiding Triggers With Heart Failure: Care Instructions. \"  Current as of: December 16, 2019               Content Version: 12.6  © 1319-4671 [employer].    Care instructions adapted under license by [employer] (which disclaims liability or warranty for this information). If you have questions about a medical condition or this instruction, always ask your healthcare professional. [address] any warranty or liability for your use of this information. Patient Education        Limiting Sodium With Heart Failure: Care Instructions  Your Care Instructions     Sodium causes your body to hold on to extra water. This may cause your heart failure symptoms to get worse. Limiting sodium may help you feel better. People get most of their sodium from processed foods. Fast food and restaurant meals also tend to be very high in sodium. Your doctor may suggest that you limit sodium. Your doctor can tell you how much sodium is right for you. An example is less than 3,000 mg a day. This includes all the salt you eat in cooked or packaged foods. Follow-up care is a key part of your treatment and safety. Be sure to make and go to all appointments, and call your doctor if you are having problems. It's also a good idea to know your test results and keep a list of the medicines you take. How can you care for yourself at home? Read food labels  · Read food labels on cans and food packages. The labels tell you how much sodium is in each serving. Make sure that you look at the serving size. If you eat more than the serving size, you have eaten more sodium than is listed for one serving. · Food labels also tell you the Percent Daily Value for sodium. Choose products with low Percent Daily Values for sodium. · Be aware that sodium can come in forms other than salt, including monosodium glutamate (MSG), sodium citrate, and sodium bicarbonate (baking soda). MSG is often added to Asian food. You can sometimes ask for food without MSG or salt.   Buy low-sodium foods  · Buy foods that are labeled \"unsalted\" (no salt added), \"sodium-free\" (less than 5 mg of sodium per serving), or \"low-sodium\" (less than 140 mg of sodium per serving). A food labeled \"light sodium\" has less than half of the full-sodium version of that food. Foods labeled \"reduced-sodium\" may still have too much sodium. · Buy fresh vegetables or plain, frozen vegetables. Buy low-sodium versions of canned vegetables, soups, and other canned goods. Prepare low-sodium meals  · Use less salt each day when cooking. Reducing salt in this way will help you adjust to the taste. Do not add salt after cooking. Take the salt shaker off the table. · Flavor your food with garlic, lemon juice, onion, vinegar, herbs, and spices instead of salt. Do not use soy sauce, steak sauce, onion salt, garlic salt, mustard, or ketchup on your food. · Make your own salad dressings, sauces, and ketchup without adding salt. · Use less salt (or none) when recipes call for it. You can often use half the salt a recipe calls for without losing flavor. Other dishes like rice, pasta, and grains do not need added salt. · Rinse canned vegetables. This removes some--but not all--of the salt. · Avoid water that has a naturally high sodium content or that has been treated with water softeners, which add sodium. Call your local water company to find out the sodium content of your water supply. If you buy bottled water, read the label and choose a sodium-free brand. Avoid high-sodium foods, such as:  · Smoked, cured, salted, and canned meat, fish, and poultry. · Ham, bertrand, hot dogs, and luncheon meats. · Regular, hard, and processed cheese and regular peanut butter. · Crackers with salted tops. · Frozen prepared meals. · Canned and dried soups, broths, and bouillon, unless labeled sodium-free or low-sodium. · Canned vegetables, unless labeled sodium-free or low-sodium. · Salted snack foods such as chips and pretzels. · [place] fries, pizza, tacos, and other fast foods.   · Pickles, olives, ketchup, and other condiments, especially soy sauce, unless labeled sodium-free or low-sodium. If you cannot cook for yourself  · Have family members or friends help you, or have someone cook low-sodium meals. · Check with your local senior nutrition program to find out where meals are served and whether they offer a low-sodium option. You can often find these programs through your local health department or hospital.  · Have meals delivered to your home. Most [facility] have a Meals on [employer]. These programs provide one hot meal a day for older adults, delivered to their homes. Ask whether these meals are low-sodium. Let them know that you are on a low-sodium diet. Where can you learn more? Go to http://www.gray.com/  Enter A166 in the search box to learn more about \"Limiting Sodium With Heart Failure: Care Instructions. \"  Current as of: December 16, 2019               Content Version: 12.6  © 0759-5487 [employer]. Care instructions adapted under license by [employer] (which disclaims liability or warranty for this information). If you have questions about a medical condition or this instruction, always ask your healthcare professional. [place] [address] any warranty or liability for your use of this information. Patient Education        Heart Rhythm Problems in Heart Failure: Care Instructions  Your Care Instructions     A heart rhythm problem, or arrhythmia, is a change in the normal rhythm of your heart. Your heart may beat too fast or too slow or beat with an irregular or skipping rhythm. A change in the heart's rhythm may feel like a really strong heartbeat or a fluttering in your chest. A severe heart rhythm problem can keep the body from getting the blood it needs. This can cause shortness of breath, lightheadedness, and fainting. A heart rhythm problem can make your heart failure worse and increase your chance of dying suddenly. You may take medicine to treat your condition.  Your doctor may recommend a pacemaker, an implantable cardioverter-defibrillator (ICD), or a procedure called catheter ablation to destroy small parts of the heart that are causing a rhythm problem. Follow-up care is a key part of your treatment and safety. Be sure to make and go to all appointments, and call your doctor if you are having problems. It's also a good idea to know your test results and keep a list of the medicines you take. How can you care for yourself at home? · Take your medicines exactly as prescribed. Talk to your doctor if you have any problems with your medicines. · If you received a pacemaker or an implantable cardioverter-defibrillator (ICD), you will get a fact sheet about it. · Wear a medical alert ID bracelet. You can buy one at most drugstores or order it online. · Make sure you go to your follow-up appointments. To change your lifestyle  · Do not smoke. · Eat a heart-healthy diet. · Limit or avoid alcohol. · Stay at a healthy weight. Lose weight if you need to. · Ask your doctor whether you can take over-the-counter medicines (such as decongestants). These can make your heart beat fast.  Be active   · Start light exercise if your doctor says you can. Even a small amount will help you get stronger, have more energy, and manage your stress. · Walk to get exercise easily. Start by walking a little more than you did the day before. Bit by bit, increase the amount you walk. · When you exercise, watch for signs that your heart is working too hard. You are pushing too hard if you cannot talk while you exercise. If you become short of breath or dizzy or have chest pain, sit down and rest.  · If your doctor has not set you up with a cardiac rehabilitation (rehab) program, talk to him or her about whether that is right for you. Cardiac rehab includes exercise, help with diet and lifestyle changes, and emotional support. It may reduce your risk of future heart problems. · Check your pulse daily.  Place two fingers on the artery at the palm side of your wrist, in line with your thumb. If your heartbeat seems uneven, talk to your doctor. When should you call for help? Call 911 anytime you think you may need emergency care. For example, call if:    · You have symptoms of sudden heart failure, such as:  ? Severe trouble breathing. ? Coughing up pink, foamy mucus. ? A new irregular or rapid heartbeat.     · You have symptoms of a heart attack. These may include:  ? Chest pain or pressure, or a strange feeling in the chest.  ? Sweating. ? Shortness of breath. ? Nausea or vomiting. ? Pain, pressure, or a strange feeling in the back, neck, jaw, or upper belly or in one or both shoulders or arms. ? Lightheadedness or sudden weakness. ? A fast or irregular heartbeat. After you call 911, the  may tell you to chew 1 adult-strength or 2 to 4 low-dose aspirin. Wait for an ambulance. Do not try to drive yourself. Call your doctor now or seek immediate medical care if:    · You have new or increased shortness of breath.     · You are dizzy or lightheaded, or you feel like you may faint.     · You have sudden weight gain, such as more than 2 to 3 pounds in a day or 5 pounds in a week. (Your doctor may suggest a different range of weight gain.)     · You have increased swelling in your legs, ankles, or feet.     · You are suddenly so tired or weak that you cannot do your usual activities. Watch closely for changes in your health, and be sure to contact your doctor if you develop new symptoms. Where can you learn more? Go to http://www.gray.com/  Enter U448 in the search box to learn more about \"Heart Rhythm Problems in Heart Failure: Care Instructions. \"  Current as of: December 16, 2019               Content Version: 12.6  © 3471-8778 [employer].    Care instructions adapted under license by [employer] (which disclaims liability or warranty for this information). If you have questions about a medical condition or this instruction, always ask your healthcare professional. [address] any warranty or liability for your use of this information. Patient armband removed and shredded        DISCHARGE SUMMARY from Nurse    PATIENT INSTRUCTIONS:    After general anesthesia or intravenous sedation, for 24 hours or while taking prescription Narcotics:  · Limit your activities  · Do not drive and operate hazardous machinery  · Do not make important personal or business decisions  · Do  not drink alcoholic beverages  · If you have not urinated within 8 hours after discharge, please contact your surgeon on call. Report the following to your surgeon:  · Excessive pain, swelling, redness or odor of or around the surgical area  · Temperature over 100.5  · Nausea and vomiting lasting longer than 4 hours or if unable to take medications  · Any signs of decreased circulation or nerve impairment to extremity: change in color, persistent  numbness, tingling, coldness or increase pain  · Any questions    What to do at Home:  Recommended activity: Activity as tolerated    If you experience any of the following symptoms Nausea, Vomiting, Diarrhea, Fever greater than 100.5, Dizziness, Severe headache, Shortness of breath, Chest pain, Increased pain, please follow up with PCP. *  Please give a list of your current medications to your Primary Care Provider. *  Please update this list whenever your medications are discontinued, doses are      changed, or new medications (including over-the-counter products) are added. *  Please carry medication information at all times in case of emergency situations. These are general instructions for a healthy lifestyle:    No smoking/ No tobacco products/ Avoid exposure to second hand smoke  Surgeon General's Warning:  Quitting smoking now greatly reduces serious risk to your health.     Obesity, smoking, and sedentary lifestyle greatly increases your risk for illness    A healthy diet, regular physical exercise & weight monitoring are important for maintaining a healthy lifestyle    You may be retaining fluid if you have a history of heart failure or if you experience any of the following symptoms:  Weight gain of 3 pounds or more overnight or 5 pounds in a week, increased swelling in our hands or feet or shortness of breath while lying flat in bed. Please call your doctor as soon as you notice any of these symptoms; do not wait until your next office visit. The discharge information has been reviewed with the patient. The patient verbalized understanding. Discharge medications reviewed with the patient and appropriate educational materials and side effects teaching were provided.   ___________________________________________________________________________________________________________________________________

## 2020-10-20 NOTE — PROGRESS NOTES
Problem: Falls - Risk of  Goal: *Absence of Falls  Description: Document Cole Jerrell Fall Risk and appropriate interventions in the flowsheet.   Outcome: Progressing Towards Goal  Note: Fall Risk Interventions:            Medication Interventions: Teach patient to arise slowly, Patient to call before getting OOB

## 2020-10-21 NOTE — ROUTINE PROCESS
Discharge paperwork and instructions reviewed and signed by patient. IV removed. Daria Adams was delivered and instructions were reviewed with patient. All belongings accounted for, patient was escorted via wheelchair down stairs by RN for discharge.

## 2020-12-01 ENCOUNTER — APPOINTMENT (OUTPATIENT)
Dept: GENERAL RADIOLOGY | Age: 70
DRG: 309 | End: 2020-12-01
Attending: EMERGENCY MEDICINE
Payer: MEDICARE

## 2020-12-01 ENCOUNTER — APPOINTMENT (OUTPATIENT)
Dept: CT IMAGING | Age: 70
DRG: 309 | End: 2020-12-01
Attending: INTERNAL MEDICINE
Payer: MEDICARE

## 2020-12-01 ENCOUNTER — HOSPITAL ENCOUNTER (INPATIENT)
Age: 70
LOS: 2 days | Discharge: HOME | DRG: 309 | End: 2020-12-03
Attending: EMERGENCY MEDICINE | Admitting: HOSPITALIST
Payer: MEDICARE

## 2020-12-01 ENCOUNTER — APPOINTMENT (OUTPATIENT)
Dept: NON INVASIVE DIAGNOSTICS | Age: 70
DRG: 309 | End: 2020-12-01
Attending: PHYSICIAN ASSISTANT
Payer: MEDICARE

## 2020-12-01 DIAGNOSIS — R07.9 CHEST PAIN, UNSPECIFIED TYPE: ICD-10-CM

## 2020-12-01 DIAGNOSIS — I48.91 ATRIAL FIBRILLATION WITH RVR (HCC): Primary | ICD-10-CM

## 2020-12-01 LAB
ALBUMIN SERPL-MCNC: 3.9 G/DL (ref 3.4–5)
ALBUMIN/GLOB SERPL: 0.9 {RATIO} (ref 0.8–1.7)
ALP SERPL-CCNC: 105 U/L (ref 45–117)
ALT SERPL-CCNC: 28 U/L (ref 16–61)
ANION GAP SERPL CALC-SCNC: 5 MMOL/L (ref 3–18)
APPEARANCE UR: NORMAL
AST SERPL-CCNC: 19 U/L (ref 10–38)
BASOPHILS # BLD: 0 K/UL (ref 0–0.1)
BASOPHILS NFR BLD: 0 % (ref 0–2)
BILIRUB SERPL-MCNC: 1.6 MG/DL (ref 0.2–1)
BILIRUB UR QL: NEGATIVE
BNP SERPL-MCNC: 4893 PG/ML (ref 0–900)
BUN SERPL-MCNC: 20 MG/DL (ref 7–18)
BUN/CREAT SERPL: 16 (ref 12–20)
CALCIUM SERPL-MCNC: 9.4 MG/DL (ref 8.5–10.1)
CHLORIDE SERPL-SCNC: 106 MMOL/L (ref 100–111)
CK MB CFR SERPL CALC: 0.7 % (ref 0–4)
CK MB SERPL-MCNC: 1.2 NG/ML (ref 5–25)
CK SERPL-CCNC: 163 U/L (ref 39–308)
CO2 SERPL-SCNC: 27 MMOL/L (ref 21–32)
COLOR UR: YELLOW
CREAT SERPL-MCNC: 1.25 MG/DL (ref 0.6–1.3)
DIFFERENTIAL METHOD BLD: ABNORMAL
ECHO LV INTERNAL DIMENSION DIASTOLIC: 5.64 CM (ref 4.2–5.9)
ECHO LV INTERNAL DIMENSION SYSTOLIC: 5.14 CM
ECHO LV IVSD: 1.19 CM (ref 0.6–1)
ECHO LV MASS 2D: 283.7 G (ref 88–224)
ECHO LV MASS INDEX 2D: 128.1 G/M2 (ref 49–115)
ECHO LV POSTERIOR WALL DIASTOLIC: 1.21 CM (ref 0.6–1)
EOSINOPHIL # BLD: 0.1 K/UL (ref 0–0.4)
EOSINOPHIL NFR BLD: 1 % (ref 0–5)
ERYTHROCYTE [DISTWIDTH] IN BLOOD BY AUTOMATED COUNT: 14.2 % (ref 11.6–14.5)
ETHANOL SERPL-MCNC: <3 MG/DL (ref 0–3)
GLOBULIN SER CALC-MCNC: 4.4 G/DL (ref 2–4)
GLUCOSE SERPL-MCNC: 120 MG/DL (ref 74–99)
GLUCOSE UR STRIP.AUTO-MCNC: NEGATIVE MG/DL
HCT VFR BLD AUTO: 45.3 % (ref 36–48)
HGB BLD-MCNC: 15.9 G/DL (ref 13–16)
HGB UR QL STRIP: NEGATIVE
KETONES UR QL STRIP.AUTO: NEGATIVE MG/DL
LEUKOCYTE ESTERASE UR QL STRIP.AUTO: NEGATIVE
LIPASE SERPL-CCNC: 82 U/L (ref 73–393)
LYMPHOCYTES # BLD: 2.4 K/UL (ref 0.9–3.6)
LYMPHOCYTES NFR BLD: 15 % (ref 21–52)
MCH RBC QN AUTO: 31.4 PG (ref 24–34)
MCHC RBC AUTO-ENTMCNC: 35.1 G/DL (ref 31–37)
MCV RBC AUTO: 89.5 FL (ref 74–97)
MONOCYTES # BLD: 1.6 K/UL (ref 0.05–1.2)
MONOCYTES NFR BLD: 10 % (ref 3–10)
NEUTS SEG # BLD: 11.8 K/UL (ref 1.8–8)
NEUTS SEG NFR BLD: 74 % (ref 40–73)
NITRITE UR QL STRIP.AUTO: NEGATIVE
PH UR STRIP: 5.5 [PH] (ref 5–8)
PLATELET # BLD AUTO: 216 K/UL (ref 135–420)
PMV BLD AUTO: 11.2 FL (ref 9.2–11.8)
POTASSIUM SERPL-SCNC: 4.3 MMOL/L (ref 3.5–5.5)
PROT SERPL-MCNC: 8.3 G/DL (ref 6.4–8.2)
PROT UR STRIP-MCNC: NEGATIVE MG/DL
RBC # BLD AUTO: 5.06 M/UL (ref 4.7–5.5)
SODIUM SERPL-SCNC: 138 MMOL/L (ref 136–145)
SP GR UR REFRACTOMETRY: 1.02 (ref 1–1.03)
TROPONIN I SERPL-MCNC: <0.02 NG/ML (ref 0–0.04)
UROBILINOGEN UR QL STRIP.AUTO: 0.2 EU/DL (ref 0.2–1)
WBC # BLD AUTO: 16 K/UL (ref 4.6–13.2)

## 2020-12-01 PROCEDURE — 93308 TTE F-UP OR LMTD: CPT

## 2020-12-01 PROCEDURE — 85025 COMPLETE CBC W/AUTO DIFF WBC: CPT

## 2020-12-01 PROCEDURE — 96365 THER/PROPH/DIAG IV INF INIT: CPT

## 2020-12-01 PROCEDURE — 74011000636 HC RX REV CODE- 636: Performed by: INTERNAL MEDICINE

## 2020-12-01 PROCEDURE — 74011250637 HC RX REV CODE- 250/637: Performed by: INTERNAL MEDICINE

## 2020-12-01 PROCEDURE — 99222 1ST HOSP IP/OBS MODERATE 55: CPT | Performed by: INTERNAL MEDICINE

## 2020-12-01 PROCEDURE — 87086 URINE CULTURE/COLONY COUNT: CPT

## 2020-12-01 PROCEDURE — 99284 EMERGENCY DEPT VISIT MOD MDM: CPT

## 2020-12-01 PROCEDURE — 93005 ELECTROCARDIOGRAM TRACING: CPT

## 2020-12-01 PROCEDURE — 74011250636 HC RX REV CODE- 250/636: Performed by: INTERNAL MEDICINE

## 2020-12-01 PROCEDURE — 99223 1ST HOSP IP/OBS HIGH 75: CPT | Performed by: INTERNAL MEDICINE

## 2020-12-01 PROCEDURE — 71045 X-RAY EXAM CHEST 1 VIEW: CPT

## 2020-12-01 PROCEDURE — 83880 ASSAY OF NATRIURETIC PEPTIDE: CPT

## 2020-12-01 PROCEDURE — 80307 DRUG TEST PRSMV CHEM ANLYZR: CPT

## 2020-12-01 PROCEDURE — 80053 COMPREHEN METABOLIC PANEL: CPT

## 2020-12-01 PROCEDURE — 74011250636 HC RX REV CODE- 250/636: Performed by: EMERGENCY MEDICINE

## 2020-12-01 PROCEDURE — 82550 ASSAY OF CK (CPK): CPT

## 2020-12-01 PROCEDURE — 96366 THER/PROPH/DIAG IV INF ADDON: CPT

## 2020-12-01 PROCEDURE — 74011250637 HC RX REV CODE- 250/637: Performed by: NURSE PRACTITIONER

## 2020-12-01 PROCEDURE — 65660000000 HC RM CCU STEPDOWN

## 2020-12-01 PROCEDURE — 83690 ASSAY OF LIPASE: CPT

## 2020-12-01 PROCEDURE — 96367 TX/PROPH/DG ADDL SEQ IV INF: CPT

## 2020-12-01 PROCEDURE — 74011250636 HC RX REV CODE- 250/636: Performed by: PHYSICIAN ASSISTANT

## 2020-12-01 PROCEDURE — 96375 TX/PRO/DX INJ NEW DRUG ADDON: CPT

## 2020-12-01 PROCEDURE — 74011000258 HC RX REV CODE- 258: Performed by: EMERGENCY MEDICINE

## 2020-12-01 PROCEDURE — 81003 URINALYSIS AUTO W/O SCOPE: CPT

## 2020-12-01 PROCEDURE — 74177 CT ABD & PELVIS W/CONTRAST: CPT

## 2020-12-01 RX ORDER — MAG HYDROX/ALUMINUM HYD/SIMETH 200-200-20
30 SUSPENSION, ORAL (FINAL DOSE FORM) ORAL
Status: DISCONTINUED | OUTPATIENT
Start: 2020-12-01 | End: 2020-12-03 | Stop reason: HOSPADM

## 2020-12-01 RX ORDER — MAG HYDROX/ALUMINUM HYD/SIMETH 200-200-20
30 SUSPENSION, ORAL (FINAL DOSE FORM) ORAL
Status: COMPLETED | OUTPATIENT
Start: 2020-12-01 | End: 2020-12-01

## 2020-12-01 RX ORDER — BISACODYL 5 MG
5 TABLET, DELAYED RELEASE (ENTERIC COATED) ORAL DAILY PRN
Status: DISCONTINUED | OUTPATIENT
Start: 2020-12-01 | End: 2020-12-03 | Stop reason: HOSPADM

## 2020-12-01 RX ORDER — GUAIFENESIN 100 MG/5ML
81 LIQUID (ML) ORAL DAILY
Status: DISCONTINUED | OUTPATIENT
Start: 2020-12-01 | End: 2020-12-03 | Stop reason: HOSPADM

## 2020-12-01 RX ORDER — SODIUM CHLORIDE 0.9 % (FLUSH) 0.9 %
5-40 SYRINGE (ML) INJECTION AS NEEDED
Status: DISCONTINUED | OUTPATIENT
Start: 2020-12-01 | End: 2020-12-03 | Stop reason: HOSPADM

## 2020-12-01 RX ORDER — SUCRALFATE 1 G/1
1 TABLET ORAL
Status: DISCONTINUED | OUTPATIENT
Start: 2020-12-01 | End: 2020-12-03 | Stop reason: HOSPADM

## 2020-12-01 RX ORDER — MORPHINE SULFATE 4 MG/ML
4 INJECTION, SOLUTION INTRAMUSCULAR; INTRAVENOUS
Status: COMPLETED | OUTPATIENT
Start: 2020-12-01 | End: 2020-12-01

## 2020-12-01 RX ORDER — LIDOCAINE HYDROCHLORIDE 20 MG/ML
15 SOLUTION OROPHARYNGEAL
Status: DISCONTINUED | OUTPATIENT
Start: 2020-12-01 | End: 2020-12-01

## 2020-12-01 RX ORDER — ATORVASTATIN CALCIUM 20 MG/1
20 TABLET, FILM COATED ORAL
Status: DISCONTINUED | OUTPATIENT
Start: 2020-12-01 | End: 2020-12-03 | Stop reason: HOSPADM

## 2020-12-01 RX ORDER — HYDROMORPHONE HYDROCHLORIDE 1 MG/ML
0.5 INJECTION, SOLUTION INTRAMUSCULAR; INTRAVENOUS; SUBCUTANEOUS
Status: DISCONTINUED | OUTPATIENT
Start: 2020-12-01 | End: 2020-12-03 | Stop reason: HOSPADM

## 2020-12-01 RX ORDER — FAMOTIDINE 20 MG/1
20 TABLET, FILM COATED ORAL EVERY 12 HOURS
Status: DISCONTINUED | OUTPATIENT
Start: 2020-12-01 | End: 2020-12-01

## 2020-12-01 RX ORDER — SODIUM CHLORIDE 0.9 % (FLUSH) 0.9 %
5-40 SYRINGE (ML) INJECTION EVERY 8 HOURS
Status: DISCONTINUED | OUTPATIENT
Start: 2020-12-01 | End: 2020-12-03 | Stop reason: HOSPADM

## 2020-12-01 RX ORDER — KETOROLAC TROMETHAMINE 15 MG/ML
15 INJECTION, SOLUTION INTRAMUSCULAR; INTRAVENOUS ONCE
Status: COMPLETED | OUTPATIENT
Start: 2020-12-01 | End: 2020-12-01

## 2020-12-01 RX ADMIN — KETOROLAC TROMETHAMINE 15 MG: 15 INJECTION, SOLUTION INTRAMUSCULAR; INTRAVENOUS at 15:25

## 2020-12-01 RX ADMIN — Medication 10 ML: at 14:00

## 2020-12-01 RX ADMIN — ATORVASTATIN CALCIUM 20 MG: 20 TABLET, FILM COATED ORAL at 21:35

## 2020-12-01 RX ADMIN — SUCRALFATE 1 G: 1 TABLET ORAL at 21:35

## 2020-12-01 RX ADMIN — Medication 10 ML: at 09:48

## 2020-12-01 RX ADMIN — ASPIRIN 81 MG CHEWABLE TABLET 81 MG: 81 TABLET CHEWABLE at 09:47

## 2020-12-01 RX ADMIN — APIXABAN 5 MG: 5 TABLET, FILM COATED ORAL at 09:47

## 2020-12-01 RX ADMIN — ALUMINUM HYDROXIDE, MAGNESIUM HYDROXIDE, AND SIMETHICONE 30 ML: 200; 200; 20 SUSPENSION ORAL at 09:46

## 2020-12-01 RX ADMIN — AMIODARONE HYDROCHLORIDE 0.5 MG/MIN: 1.8 INJECTION, SOLUTION INTRAVENOUS at 16:26

## 2020-12-01 RX ADMIN — DIATRIZOATE MEGLUMINE AND DIATRIZOATE SODIUM 30 ML: 600; 100 SOLUTION ORAL; RECTAL at 15:51

## 2020-12-01 RX ADMIN — ALUMINUM HYDROXIDE, MAGNESIUM HYDROXIDE, AND SIMETHICONE 30 ML: 200; 200; 20 SUSPENSION ORAL at 17:38

## 2020-12-01 RX ADMIN — MORPHINE SULFATE 4 MG: 4 INJECTION, SOLUTION INTRAMUSCULAR; INTRAVENOUS at 06:03

## 2020-12-01 RX ADMIN — HYDROMORPHONE HYDROCHLORIDE 0.5 MG: 1 INJECTION, SOLUTION INTRAMUSCULAR; INTRAVENOUS; SUBCUTANEOUS at 21:45

## 2020-12-01 RX ADMIN — IOPAMIDOL 80 ML: 612 INJECTION, SOLUTION INTRAVENOUS at 19:08

## 2020-12-01 RX ADMIN — Medication 10 ML: at 21:44

## 2020-12-01 RX ADMIN — APIXABAN 5 MG: 5 TABLET, FILM COATED ORAL at 17:37

## 2020-12-01 RX ADMIN — SUCRALFATE 1 G: 1 TABLET ORAL at 17:37

## 2020-12-01 RX ADMIN — AMIODARONE HYDROCHLORIDE 1 MG/MIN: 1.8 INJECTION, SOLUTION INTRAVENOUS at 08:57

## 2020-12-01 RX ADMIN — FAMOTIDINE 20 MG: 20 TABLET, FILM COATED ORAL at 09:47

## 2020-12-01 RX ADMIN — SODIUM CHLORIDE 10 MG/HR: 900 INJECTION, SOLUTION INTRAVENOUS at 04:46

## 2020-12-01 NOTE — PROGRESS NOTES
Persistent epigastric pain despite GI support. Lipase is normal.   Will get CT abd/pelv. Can have 1 dose of toradol for pain.      Kirk Tobias MD

## 2020-12-01 NOTE — CONSULTS
[facility] - Consult Note    Consultation request by Dino Emery MD for advice/opinion related to evaluating A-fib [facility]) [I48.91]    Date of  Admission: 12/1/2020  3:37 AM   Primary Care Physician:  None     Assessment:     -Chest pain atypical somewhat pleuritic but also possible component of GI related given GI history and recent ETOH consumption. Initial troponin unremarkable. ECG with LAD, PRWP and nonspecific ST wave abnormalities similar to previous tracings.  -Paroxysmal atrial fibrillation recently PAF during admission for CHF 10/2020, remote PAF during GIB 2014, presented with PAF with RVR this admission with borderline BP. On coreg and Eliquis as outpatient.  -Chronic systolic heart failure, would not aggressively diurese in setting of borderline hemodynamics. -Newly diagnosed nonischemic cardiomyopathy with EF 15% by echo 10/18/20. No longer wearing lifevest as patients insurance did not approve and hospital could only provide for 6 weeks. -s/p heart cath 10/19/20 with 90% ostial ramus, medical therapy. On ASA, statin and BB as outpatient.  -Non-Hodgkins lymphoma with gastric tumor. Received CHOP, no radiation and has been in remission. Followed by Dr. Varsha Jaffe. -h/o remote GIB 2014. -GI bleed, s/p EGD 12/30/13 with active bleeding, unsuccessful epinephrine injections   -s/p mesenteric angiogram with coil embolization to gastric artery 12/30/13   -Hepatitis B/C positive. -ETOH use. Reports to drinking 5-6 mixed drinks last night. Patient to establish care with Dr. Evonne Price. Plan:     Start amiodarone infusion due to low BP with A.fib/RVR, taking Eliquis. Can load with digoxin if further tachycardia and hypotension. No ace or arb historically given low BP. Would not aggressively diurese in setting of borderline hemodynamics, not on maintenance diuretics historically given borderline BP. Ok to continue home Eliquis from cardiac standpoint.   Agree with trial of PPI and GI cocktail. Will check BNP. Lipase pending. Will review follow up echo this AM once complete. Patient reports he now has medicare, can consider lifevest again if able to approve. Advised importance of ETOH cessation, continue to monitor for withdrawals. I saw, examined, and evaluated the patient. I personally reviewed the patient's labs, tests, vitals, orders, medications, updated history, and other providers assessments. I personally agree with the findings as stated and the plan as documented. History of Present Illness: This is a 72 y.o. male admitted for A-fib ([facility] [I48.91]. Patient complains of:  CP. Patient is a 72year old male who presented with CP sharp stabbing substernal with radiation to back constant but worse with deep inspiration or when laying back. Patient reports increased SOB/orthopea over the past couple of days. Patient denies KEVIN. Patient reports symptoms very different when compared to previous admission for CHF new [address] and [place] CAD medically managed.       Cardiac risk factors: male gender      Review of Symptoms:  Except as stated above include:  Constitutional:  negative  Respiratory:  negative  Cardiovascular:  C/o CP, Gastrointestinal: negative  Genitourinary:  negative  Musculoskeletal:  Negative  Neurological:  Negative  Dermatological:  Negative  Endocrinological: Negative  Psychological:  Negative       Past Medical History:     Past Medical History:   Diagnosis Date    Cancer ([facility]     Gastrointestinal disorder     Hep C w/o coma, chronic ([facility] 8-10-13    Liver disease current    non-hodgkins lymphoma    NHL (nodular histiocytic lymphoma) (HCC)          Social History:     Social History     Socioeconomic History    Marital status:      Spouse name: Not on file    Number of children: Not on file    Years of education: Not on file    Highest education level: Not on file   Tobacco Use    Smoking status: Former Smoker Packs/day: 2.00     Years: 40.00     Pack years: 80.00     Types: Cigarettes     Last attempt to quit: 10/9/2013     Years since quittin.1    Smokeless tobacco: Never Used   Substance and Sexual Activity    Alcohol use: Yes     Frequency: 2-3 times a week    Drug use: No     Comment: drinks generally 2-3 drinks per week, however  drank 6 mixed drinks yesterday    Sexual activity: Not Currently     Partners: Female        Family History:     Family History   Problem Relation Age of Onset    Cancer Mother 76        lung cancer    Stroke Mother 76    Diabetes Mother 67    Heart Attack Father 76        cause of death    Heart Attack Paternal Grandfather 72        Medications: Allergies   Allergen Reactions    Codeine Other (comments)     Pain in his abdominal area.  Acetaminophen Other (comments)     Patient states cannot have acetaminophen due to hep C        Current Facility-Administered Medications   Medication Dose Route Frequency    amiodarone (CORDARONE) 450 mg in dextrose 5% 250 ml infusion  0.5-1 mg/min IntraVENous CONTINUOUS     Current Outpatient Medications   Medication Sig    apixaban (ELIQUIS) 5 mg tablet Take 1 Tab by mouth two (2) times a day.  aspirin 81 mg chewable tablet Take 1 Tab by mouth daily.  atorvastatin (LIPITOR) 20 mg tablet Take 1 Tab by mouth nightly.  carvediloL (COREG) 3.125 mg tablet Take 1 Tab by mouth every twelve (12) hours.  pantoprazole (PROTONIX) 40 mg tablet Take 1 Tab by mouth daily.          Physical Exam:     Visit Vitals  BP (!) 92/56   Pulse 99   Temp 98.8 °F (37.1 °C)   Resp 28   SpO2 92%     BP Readings from Last 3 Encounters:   20 (!) 92/56   10/20/20 116/78   14 113/64     Pulse Readings from Last 3 Encounters:   20 99   10/20/20 97   14 64     Wt Readings from Last 3 Encounters:   10/19/20 230 lb 3.2 oz (104.4 kg)   14 202 lb (91.6 kg)   14 204 lb 6.4 oz (92.7 kg)       General:  alert, cooperative, no distress, appears stated age  Neck:  no JVD  Lungs:  clear to auscultation bilaterally  Heart:  irregularly irregular rhythm  Abdomen:  abdomen is soft without significant tenderness, masses, organomegaly or guarding  Extremities:  extremities normal, atraumatic, no cyanosis or edema  Skin: Warm and dry.  no hyperpigmentation, vitiligo, or suspicious lesions  Neuro: alert, oriented x3, affect appropriate  Psych: non focal     Data Review:     Recent Labs     12/01/20  0326   WBC 16.0*   HGB 15.9   HCT 45.3        Recent Labs     12/01/20  0427      K 4.3      CO2 27   *   BUN 20*   CREA 1.25   CA 9.4   ALB 3.9   ALT 28       Results for orders placed or performed during the hospital encounter of 12/01/20   EKG, 12 LEAD, INITIAL   Result Value Ref Range    Ventricular Rate 137 BPM    Atrial Rate 170 BPM    QRS Duration 116 ms    Q-T Interval 340 ms    QTC Calculation (Bezet) 513 ms    Calculated R Axis -44 degrees    Calculated T Axis 88 degrees    Diagnosis       Atrial fibrillation with rapid ventricular response with premature   ventricular or aberrantly conducted complexes  Left axis deviation  Possible Anterior infarct , age undetermined  Abnormal ECG  When compared with ECG of 17-OCT-2020 22:02,  Atrial fibrillation has replaced Sinus rhythm  QRS duration has increased  Borderline criteria for Anterior infarct are now present         All Cardiac Markers in the last 24 hours:    Lab Results   Component Value Date/Time     12/01/2020 04:27 AM    CKMB 1.2 12/01/2020 04:27 AM    CKND1 0.7 12/01/2020 04:27 AM    TROIQ <0.02 12/01/2020 04:27 AM       Last Lipid:  No results found for: CHOL, CHOLX, CHLST, CHOLV, HDL, HDLP, LDL, LDLC, DLDLP, TGLX, TRIGL, TRIGP, CHHD, CHHDX    Signed By: MICHELLE Rosen     December 1, 2020

## 2020-12-01 NOTE — H&P
Hospitalist Admission History and Physical    NAME:  Clifton Rodriguez Sr.   :   1950   MRN:   [id number]     PCP:  None  Date/Time:  2020 8:29 AM    Subjective:   CHIEF COMPLAINT:  Sharp mid sternal chest pain    HISTORY OF PRESENT ILLNESS:     Jeannette Hansen is a 72 y.o.  male who presents with sharp midsternal chest pain since last night at approximately 9 PM.  Pain had improved with morphine earlier today but no symptom of pain has resumed. Patient relates that his chest pain started after he took nasal Mucinex and he also drank 6 mixed drinks yesterday. Reports current pain is not as much like a pressure is with prior admission. Reports mild intermittent nausea and no radiation of pain into shoulder or jaw, no current palpitations. Patient finds it difficult to take deep breath due to pain. He also relates he is having some shortness of breath. Reports mild congestion for which he took the Mucinex last night but otherwise no sinus pain, no cough, no fevers chills or urinary complaints. Denies any loose stool or abdominal pain. No other complaints including he denies any reflux type symptoms, no joint pain or swelling. He reports he rarely drinks as much alcohol as he did yesterday will generally drink 1-2 drinks per week. [place] tobacco or illicit drug use. He also states he had to return the LifeVest that was ordered from last admission due to nonpayment for his insurance.      Past Medical History:   Diagnosis Date    Cancer [facility])     Gastrointestinal disorder     Hep C w/o coma, chronic ([facility] 8-10-13    Liver disease current    non-hodgkins lymphoma    NHL (nodular histiocytic lymphoma) (HCC)         Past Surgical History:   Procedure Laterality Date    ABDOMEN SURGERY PROC UNLISTED  2014    65% of stomach removed    HX HEENT      tonssillectomy    HX SPLENECTOMY  2014    possibly partial       Social History     Tobacco Use    Smoking status: Former Smoker     Packs/day: 2.00     Years: 40.00     Pack years: 80.00     Types: Cigarettes     Last attempt to quit: 10/9/2013     Years since quittin.1    Smokeless tobacco: Never Used   Substance Use Topics    Alcohol use: Yes     Frequency: 2-3 times a week        Family History   Problem Relation Age of Onset    Cancer Mother 76        lung cancer    Stroke Mother 76    Diabetes Mother 67    Heart Attack Father 76        cause of death    Heart Attack Paternal Grandfather 72        Allergies   Allergen Reactions    Codeine Other (comments)     Pain in his abdominal area.  Acetaminophen Other (comments)     Patient states cannot have acetaminophen due to hep C        Prior to Admission Medications   Prescriptions Last Dose Informant Patient Reported? Taking? apixaban (ELIQUIS) 5 mg tablet 2020 at Unknown time  No Yes   Sig: Take 1 Tab by mouth two (2) times a day. aspirin 81 mg chewable tablet 2020 at Unknown time  No Yes   Sig: Take 1 Tab by mouth daily. atorvastatin (LIPITOR) 20 mg tablet 2020 at Unknown time  No Yes   Sig: Take 1 Tab by mouth nightly. carvediloL (COREG) 3.125 mg tablet 2020 at Unknown time  No Yes   Sig: Take 1 Tab by mouth every twelve (12) hours. pantoprazole (PROTONIX) 40 mg tablet Not Taking at Unknown time  No No   Sig: Take 1 Tab by mouth daily. Facility-Administered Medications: None       REVIEW OF SYSTEMS:     [] Unable to obtain  ROS due to  []mental status change  []sedated   []intubated   [x]Total of 12 systems reviewed as follows:    GENERAL: no fever or chills   HEENT: + Mild sinus congestion / hearing or vision changes  NECK: No pain or stiffness.    PULMONARY: + Shortness of breath in setting of not being able to take a deep breath; denies cough  Cardiovascular:  Complains of mid epigastric sharp pain currently  / denies palpitations; no lower extremity edema  GASTROINTESTINAL: Denies abdominal pain, constipation, diarrhea, nausea, vomiting or melena / bloody stools  GENITOURINARY: No urinary frequency, urgency, hesitancy or dysuria. MUSCULOSKELETAL: no back / joint or muscle pain, no recent trauma. DERMATOLOGIC: denies pruritis, rashes or open areas   ENDOCRINE: No polyuria, polydipsia, no heat or cold intolerance. HEMATOLOGICAL: No anemia or easy bruising or bleeding. NEUROLOGIC:  no focal weakness,  no speech changes     Objective:   VITALS:    Visit Vitals  BP (!) 86/53   Pulse 89   Temp 98.8 °F (37.1 °C)   Resp 22   SpO2 90%     Temp (24hrs), Av.8 °F (37.1 °C), Min:98.8 °F (37.1 °C), Max:98.8 °F (37.1 °C)    PHYSICAL EXAM:   General:          Alert, in NAD  HEENT:           Pupils equal.  Sclera anicteric. Conjunctiva pink. Mucous membranes                          Moist, no ear or nasal discharge  Neck:               Supple. Trachea midline. No accessory muscle use. No jugular venous distention, no carotid bruit  CV:                   Irregular rate and rhythm. S1S2+  Lungs:             Clear to auscultation bilaterally. No Wheezing or Rhonchi. No rales. Abdomen:        Soft, non-tender. Not distended. Bowel sounds normal.   Extremities:     No cyanosis. No edema. Pulses 2+ b/l  Neurologic:      Alert and oriented X 4. Follows commands, responds appropriately. No focal neurological deficit was noted  Skin:                Warm and dry. No rashes.      LAB DATA REVIEWED:    No components found for: [place]  Recent Labs     20  0427 20  0326     --    K 4.3  --      --    CO2 27  --    BUN 20*  --    CREA 1.25  --    *  --    CA 9.4  --    ALB 3.9  --    WBC  --  16.0*   HGB  --  15.9   HCT  --  45.3   PLT  --  216     IMAGING RESULTS:      [x]  I have personally reviewed the actual   [x]CXR  []CT scan    Assessment/Plan:      Active Problems:    [facility] (2020)     ___________________________________________________  PLAN:    1. A. fib with RVR - management per cardiology /discussed with cardiology MICHELLE Oliva /in setting of hypotension may transition to amiodarone drip per cardiology /continue Eliquis /cardiac monitoring  2. Chronic severe systolic heart failure - no evidence of acute exacerbation -check BNP  / recent LHC on 10/19/2020 without obstructive etiology / echocardiogram on 10/18/2020 with EF of 15% /will need life vest prior to discharge -patient states he had to return his LifeVest due to lack of insurance coverage  3. Alcohol use- reports heavy use yesterday / check alcohol level / check lipase /admits to some loneliness since death of his wife in August 2020  4. Sharp epigastric pain - ? In setting of A. Fib versus GI cause -rule out pancreatitis / GERD / start GI cocktail along with Pepcid daily  5. Mild leukocytosis -no clear infectious cause /will hold off on antibiotics for right now  6. History of non-Hodgkin's lymphoma status post CHOP -treatment in 2013 / 2014  7. History of GI bleed -remote in 2014 no current concerns  8. Recent noncompliance -in setting of lack of insurance /will need new PCP at time of discharge    CODE STATUS discussed with patient he expresses wish for continuation of full code, he states he would not want prolonged measures if poor prognosis.     Risk of deterioration:  []Low    [x]Moderate  []High    Prophylaxis:  []Lovenox   Eliquis [x][]Hep SQ  []SCDs  []H2B/PPI    Disposition:  []Home w/ Family   [x] PT,OT,RN   []SNF/LTC   []SAH/Rehab    Discussed Code Status:    [x]Full Code      []DNR     ___________________________________________________    Care Plan discussed with:    [x]Patient   []Family    []ED Care Manager  []ED Doc   [x]Specialist : Cardiology ___________________________________________________  Admitting Provider: Mickey Menchaca NP

## 2020-12-01 NOTE — ED NOTES
Assumed care of pt  Pt ambulated from triage with strong steady gait  Introduced self to pt  Pt alert and oriented x 4  C/o chest pain stated he cannot breath when laying flat   Pt dyspnea and SOB   Pt denied radiating pain   Pt placed on monitor    Updated about plan of care   Will continue to monitor and assess

## 2020-12-01 NOTE — ED TRIAGE NOTES
Patient c/o chest pain that started early this evening, hx of AFib. Patient says he did do some standing push ups yesterday evening and has back pain and soreness under his ribs.

## 2020-12-01 NOTE — ED PROVIDER NOTES
[facility]  [facility]        4:32 AM    Date: 12/1/2020  Patient Name: Kaylah Acosta.    History of Presenting Illness     Chief Complaint   Patient presents with    Chest Pain    Shortness of Breath       72 y.o. male with noted past medical history who presents to the emergency department with chest pain. Patient states about 2100 hrs. last night the patient started to have some chest pain in the lower sternal area that goes through to his back with deep breaths. The pain is pleuritic in nature and also worse with torso twisting or reproducible by palpation. He states he had a similar pain but it was more of a pressure when he was diagnosed with rapid A. fib and CHF recently. At that time he was admitted in the hospital for a few days. He is on Eliquis as well as carvedilol for his A. fib. The patient denies recent travel outside [facility] and denies recent travel to areas large social gatherings. The patient denies any known history of Covid 19 exposure. Patient denies any other associated signs or symptoms. Patient denies any other complaints. Nursing notes regarding the HPI and triage nursing notes were reviewed. Prior medical records were reviewed. Current Facility-Administered Medications   Medication Dose Route Frequency Provider Last Rate Last Dose    dilTIAZem (CARDIZEM) 100 mg in 0.9% sodium chloride (MBP/ADV) 100 mL infusion  10 mg/hr IntraVENous TITRATE Reymundo Portillo MD 10 mL/hr at 12/01/20 0446 10 mg/hr at 12/01/20 0446     Current Outpatient Medications   Medication Sig Dispense Refill    apixaban (ELIQUIS) 5 mg tablet Take 1 Tab by mouth two (2) times a day. 60 Tab 2    aspirin 81 mg chewable tablet Take 1 Tab by mouth daily. 30 Tab 2    atorvastatin (LIPITOR) 20 mg tablet Take 1 Tab by mouth nightly. 30 Tab 2    carvediloL (COREG) 3.125 mg tablet Take 1 Tab by mouth every twelve (12) hours.  60 Tab 2    pantoprazole (PROTONIX) 40 mg tablet Take 1 Tab by mouth daily. 27 Tab 1       Past History     Past Medical History:  Past Medical History:   Diagnosis Date    [facility] [address]     Gastrointestinal disorder     Hep C w/o coma, chronic ([facility] [address] 8-10-13    Liver disease current    non-hodgkins lymphoma       Past Surgical History:  Past Surgical History:   Procedure Laterality Date    ABDOMEN SURGERY PROC UNLISTED  2014    65% of stomach removed    HX HEENT      tonssillectomy    HX SPLENECTOMY  2014    possibly partial       Family History:  Family History   Problem Relation Age of Onset    Cancer Mother 76        lung cancer    Stroke Mother 76    Diabetes Mother 67    Heart Attack Father 76        cause of death    Heart Attack Paternal Grandfather 72       Social History:  Social History     Tobacco Use    Smoking status: Former Smoker     Packs/day: 2.00     Years: 40.00     Pack years: 80.00     Types: Cigarettes     Last attempt to quit: 10/9/2013     Years since quittin.1    Smokeless tobacco: Never Used   Substance Use Topics    Alcohol use: No    Drug use: No       Allergies: Allergies   Allergen Reactions    Codeine Other (comments)     Pain in his abdominal area.  Acetaminophen Other (comments)     Patient states cannot have acetaminophen due to hep C       Patient's primary care provider (as noted in EPIC):  None    Review of Systems   Constitutional: Negative for diaphoresis. HENT: Negative for congestion. Eyes: Negative for discharge. Respiratory: Negative for shortness of breath, wheezing and stridor. Cardiovascular: Positive for chest pain. Negative for palpitations. Gastrointestinal: Negative for diarrhea. Endocrine: Negative for heat intolerance. Genitourinary: Negative for flank pain. Musculoskeletal: Negative for back pain. Neurological: Negative for weakness. Psychiatric/Behavioral: Negative for hallucinations. All other systems reviewed and are negative.       Visit Vitals  BP (!) 114/94   Pulse (!) 116   Temp 98.8 °F (37.1 °C)   Resp 27   SpO2 98%       PHYSICAL EXAM:    CONSTITUTIONAL:  Alert, in no apparent distress;  well developed;  well nourished. HEAD:  Normocephalic, atraumatic. EYES:  EOMI. Non-icteric sclera. Normal conjunctiva. ENTM:  Nose:  no rhinorrhea. Throat:  no erythema or exudate, mucous membranes moist.  NECK:  No JVD. Supple  RESPIRATORY:  Chest clear, equal breath sounds, good air movement. CARDIOVASCULAR:  No murmurs, rubs, or gallops. Irregularly irregular heart rate consistent with atrial fibrillation. Chest:  No rash, lesions, bruising. No reproducible tenderness to palpation. GI:  Normal bowel sounds, abdomen soft and non-tender. No rebound or guarding. BACK:  Non-tender. UPPER EXT:  Normal inspection. LOWER EXT:  No edema, no calf tenderness. Distal pulses intact. NEURO:  Moves all four extremities, and grossly normal motor exam.  SKIN:  No rashes;  Normal for age. PSYCH:  Alert and normal affect. DIFFERENTIAL DIAGNOSES/ MEDICAL DECISION MAKING:  Atrial fibrillation from possible cardiac etiology, to include one of a multitule of underlying arrhythmias, presentations as part of an acute cardiac event, including acute myocardial infarction/ acute coronary syndrome, mitral valve prolapse associated chest pain and symptoms, valvular disease, acute alcohol intoxication (holiday heart), underlying sepsis, electrolyte imbalance, endocrine or thyroid axis disorder, dehydration, stress induced, versus numerous other less etiologies (including pulmonary embolus and atrial myxoma) or a combination of the above.     However, in this patient with known atrial fibrillation, this presentation is most likely a recurrence of his underlying atrial fibrillation without a new etiologic event (such as new AMI, etc.)    Diagnostic Study Results     Abnormal lab results from this emergency department encounter:  Labs Reviewed   CBC WITH AUTOMATED DIFF - Abnormal; Notable for the following components:       Result Value    WBC 16.0 (*)     NEUTROPHILS 74 (*)     LYMPHOCYTES 15 (*)     ABS. NEUTROPHILS 11.8 (*)     ABS. MONOCYTES 1.6 (*)     All other components within normal limits   METABOLIC PANEL, COMPREHENSIVE - Abnormal; Notable for the following components:    Glucose 120 (*)     BUN 20 (*)     GFR est non-AA 57 (*)     Bilirubin, total 1.6 (*)     Protein, total 8.3 (*)     Globulin 4.4 (*)     All other components within normal limits   CARDIAC PANEL,(CK, CKMB & TROPONIN)       Lab values for this patient within approximately the last 12 hours:  Recent Results (from the past 12 hour(s))   CBC WITH AUTOMATED DIFF    Collection Time: 12/01/20  3:26 AM   Result Value Ref Range    WBC 16.0 (H) 4.6 - 13.2 K/uL    RBC 5.06 4.70 - 5.50 M/uL    HGB 15.9 13.0 - 16.0 g/dL    HCT 45.3 36.0 - 48.0 %    MCV 89.5 74.0 - 97.0 FL    MCH 31.4 24.0 - 34.0 PG    MCHC 35.1 31.0 - 37.0 g/dL    RDW 14.2 11.6 - 14.5 %    PLATELET 005 887 - 705 K/uL    MPV 11.2 9.2 - 11.8 FL    NEUTROPHILS 74 (H) 40 - 73 %    LYMPHOCYTES 15 (L) 21 - 52 %    MONOCYTES 10 3 - 10 %    EOSINOPHILS 1 0 - 5 %    BASOPHILS 0 0 - 2 %    ABS. NEUTROPHILS 11.8 (H) 1.8 - 8.0 K/UL    ABS. LYMPHOCYTES 2.4 0.9 - 3.6 K/UL    ABS. MONOCYTES 1.6 (H) 0.05 - 1.2 K/UL    ABS. EOSINOPHILS 0.1 0.0 - 0.4 K/UL    ABS.  BASOPHILS 0.0 0.0 - 0.1 K/UL    DF AUTOMATED     CARDIAC PANEL,(CK, CKMB & TROPONIN)    Collection Time: 12/01/20  4:27 AM   Result Value Ref Range    CK - MB 1.2 <3.6 ng/ml    CK-MB Index 0.7 0.0 - 4.0 %     39 - 308 U/L    Troponin-I, QT <0.02 0.0 - 4.660 NG/ML   METABOLIC PANEL, COMPREHENSIVE    Collection Time: 12/01/20  4:27 AM   Result Value Ref Range    Sodium 138 136 - 145 mmol/L    Potassium 4.3 3.5 - 5.5 mmol/L    Chloride 106 100 - 111 mmol/L    CO2 27 21 - 32 mmol/L    Anion gap 5 3.0 - 18 mmol/L    Glucose 120 (H) 74 - 99 mg/dL    BUN 20 (H) 7.0 - 18 MG/DL    Creatinine 1.25 0.6 - 1.3 MG/DL    BUN/Creatinine ratio 16 12 - 20      GFR est AA >60 >60 ml/min/1.73m2    GFR est non-AA 57 (L) >60 ml/min/1.73m2    Calcium 9.4 8.5 - 10.1 MG/DL    Bilirubin, total 1.6 (H) 0.2 - 1.0 MG/DL    ALT (SGPT) 28 16 - 61 U/L    AST (SGOT) 19 10 - 38 U/L    Alk. phosphatase 105 45 - 117 U/L    Protein, total 8.3 (H) 6.4 - 8.2 g/dL    Albumin 3.9 3.4 - 5.0 g/dL    Globulin 4.4 (H) 2.0 - 4.0 g/dL    A-G Ratio 0.9 0.8 - 1.7         Radiologist and cardiologist interpretations if available at time of this note:  No results found. Medication(s) ordered for patient during this emergency visit encounter:  Medications   dilTIAZem (CARDIZEM) 100 mg in 0.9% sodium chloride (MBP/ADV) 100 mL infusion (10 mg/hr IntraVENous New Bag 12/1/20 0446)       Medical Decision Making     I am the first provider for this patient. I reviewed the vital signs, available nursing notes, past medical history, past surgical history, family history and social history. Vital Signs:  Reviewed the patient's vital signs. EKG:  Atrial fibrillation with ventricular response of 140 bpm.      ED COURSE:  Patient given cardizem drip for rate control of rapid atrial fibrillation in the emergency department. Critical Care Note:    Critical care minutes: 33 MINUTES. Given patient's presentation with atrial fibrillation with rapid ventricular response as well as other clinical findings, total bedside time evaluating the patient's hemodynamic status and response to medications as well as the underlying cardiac rhythm was as noted above. Given the patients underlying condition, requiring numerous reevaluations of patient's vital signs and response to different emergency department therapies, total bedside time evaluating and/or treating the patient, not including procedures, is noted above.     IMPRESSION AND MEDICAL DECISION MAKING:  Based upon the patients presentation with noted HPI and PE, along with the work up done in the emergency department, I believe that the patient is having new onset atrial fibrillation, which will require admission for rate control and further evaluation to determine etiology of the atrial fibrillation. Admit to Hospitalist    The patient was presented to the accepting hospitalist, Dr. Juan Ramon. .    The patient's primary doctor is None, and admissions for this physician are with the hospitalist.  If the patient has no primary doctor, then admission is to the hospitalist as well. As the emergency physician, I wrote courtesy admission orders for the hospitalist physician. The courtesy orders included explicit instructions for the floor nursing staff to call the admitting attending physician upon patient arrival on the floor. Consult Cardiology    The on call cardiologist was called and the patient was presented for cardiology consult. I personally spoke with NP [facility], in the cardiology group, about the patient's presentation and management. I subsequently placed the noted cardiologist on the treatment team.      Coding Diagnoses     Clinical Impression:   1. Atrial fibrillation with RVR (HCC)    2. Chest pain, unspecified type        Disposition     Disposition: Admit. [place]TA[place] Fang Board Certified Emergency Physician    Provider Attestation:  If a scribe was utilized in generation of this patient record, I personally performed the services described in the documentation, reviewed the documentation, as recorded by the scribe in my presence, and it accurately records the patient's history of presenting illness, review of systems, patient physical examination, and procedures performed by me as the attending physician. MONTANA Fang Board Certified Emergency Physician  12/1/2020.  4:32 AM

## 2020-12-02 LAB
ANION GAP SERPL CALC-SCNC: 5 MMOL/L (ref 3–18)
ATRIAL RATE: 170 BPM
BACTERIA SPEC CULT: NORMAL
BASOPHILS # BLD: 0 K/UL (ref 0–0.1)
BASOPHILS NFR BLD: 0 % (ref 0–2)
BUN SERPL-MCNC: 26 MG/DL (ref 7–18)
BUN/CREAT SERPL: 19 (ref 12–20)
CALCIUM SERPL-MCNC: 8.8 MG/DL (ref 8.5–10.1)
CALCULATED R AXIS, ECG10: -44 DEGREES
CALCULATED T AXIS, ECG11: 88 DEGREES
CHLORIDE SERPL-SCNC: 105 MMOL/L (ref 100–111)
CO2 SERPL-SCNC: 25 MMOL/L (ref 21–32)
CREAT SERPL-MCNC: 1.39 MG/DL (ref 0.6–1.3)
DIAGNOSIS, 93000: NORMAL
DIFFERENTIAL METHOD BLD: ABNORMAL
EOSINOPHIL # BLD: 0.1 K/UL (ref 0–0.4)
EOSINOPHIL NFR BLD: 1 % (ref 0–5)
ERYTHROCYTE [DISTWIDTH] IN BLOOD BY AUTOMATED COUNT: 14.6 % (ref 11.6–14.5)
GLUCOSE SERPL-MCNC: 120 MG/DL (ref 74–99)
HCT VFR BLD AUTO: 42 % (ref 36–48)
HGB BLD-MCNC: 14.4 G/DL (ref 13–16)
LYMPHOCYTES # BLD: 2.6 K/UL (ref 0.9–3.6)
LYMPHOCYTES NFR BLD: 18 % (ref 21–52)
MAGNESIUM SERPL-MCNC: 2 MG/DL (ref 1.6–2.6)
MCH RBC QN AUTO: 31.2 PG (ref 24–34)
MCHC RBC AUTO-ENTMCNC: 34.3 G/DL (ref 31–37)
MCV RBC AUTO: 90.9 FL (ref 74–97)
MONOCYTES # BLD: 2 K/UL (ref 0.05–1.2)
MONOCYTES NFR BLD: 13 % (ref 3–10)
NEUTS SEG # BLD: 10 K/UL (ref 1.8–8)
NEUTS SEG NFR BLD: 68 % (ref 40–73)
PLATELET # BLD AUTO: 187 K/UL (ref 135–420)
PMV BLD AUTO: 11.5 FL (ref 9.2–11.8)
POTASSIUM SERPL-SCNC: 3.8 MMOL/L (ref 3.5–5.5)
Q-T INTERVAL, ECG07: 340 MS
QRS DURATION, ECG06: 116 MS
QTC CALCULATION (BEZET), ECG08: 513 MS
RBC # BLD AUTO: 4.62 M/UL (ref 4.7–5.5)
SERVICE CMNT-IMP: NORMAL
SODIUM SERPL-SCNC: 135 MMOL/L (ref 136–145)
VENTRICULAR RATE, ECG03: 137 BPM
WBC # BLD AUTO: 14.8 K/UL (ref 4.6–13.2)

## 2020-12-02 PROCEDURE — 74011250637 HC RX REV CODE- 250/637: Performed by: PHYSICIAN ASSISTANT

## 2020-12-02 PROCEDURE — 85025 COMPLETE CBC W/AUTO DIFF WBC: CPT

## 2020-12-02 PROCEDURE — 74011250637 HC RX REV CODE- 250/637: Performed by: INTERNAL MEDICINE

## 2020-12-02 PROCEDURE — 83735 ASSAY OF MAGNESIUM: CPT

## 2020-12-02 PROCEDURE — 2709999900 HC NON-CHARGEABLE SUPPLY

## 2020-12-02 PROCEDURE — 80048 BASIC METABOLIC PNL TOTAL CA: CPT

## 2020-12-02 PROCEDURE — 65660000000 HC RM CCU STEPDOWN

## 2020-12-02 PROCEDURE — 77030027138 HC INCENT SPIROMETER -A

## 2020-12-02 PROCEDURE — 36415 COLL VENOUS BLD VENIPUNCTURE: CPT

## 2020-12-02 PROCEDURE — 74011250636 HC RX REV CODE- 250/636: Performed by: PHYSICIAN ASSISTANT

## 2020-12-02 PROCEDURE — 74011250637 HC RX REV CODE- 250/637: Performed by: NURSE PRACTITIONER

## 2020-12-02 PROCEDURE — 99232 SBSQ HOSP IP/OBS MODERATE 35: CPT | Performed by: INTERNAL MEDICINE

## 2020-12-02 RX ORDER — CARVEDILOL 3.12 MG/1
3.12 TABLET ORAL 2 TIMES DAILY WITH MEALS
Status: DISCONTINUED | OUTPATIENT
Start: 2020-12-02 | End: 2020-12-03 | Stop reason: HOSPADM

## 2020-12-02 RX ADMIN — AMIODARONE HYDROCHLORIDE 0.5 MG/MIN: 1.8 INJECTION, SOLUTION INTRAVENOUS at 20:23

## 2020-12-02 RX ADMIN — Medication 10 ML: at 06:00

## 2020-12-02 RX ADMIN — ALUMINUM HYDROXIDE, MAGNESIUM HYDROXIDE, AND SIMETHICONE 30 ML: 200; 200; 20 SUSPENSION ORAL at 18:12

## 2020-12-02 RX ADMIN — CARVEDILOL 3.12 MG: 3.12 TABLET, FILM COATED ORAL at 10:19

## 2020-12-02 RX ADMIN — SUCRALFATE 1 G: 1 TABLET ORAL at 16:43

## 2020-12-02 RX ADMIN — ALUMINUM HYDROXIDE, MAGNESIUM HYDROXIDE, AND SIMETHICONE 30 ML: 200; 200; 20 SUSPENSION ORAL at 08:12

## 2020-12-02 RX ADMIN — AMIODARONE HYDROCHLORIDE 0.5 MG/MIN: 1.8 INJECTION, SOLUTION INTRAVENOUS at 08:09

## 2020-12-02 RX ADMIN — ATORVASTATIN CALCIUM 20 MG: 20 TABLET, FILM COATED ORAL at 21:27

## 2020-12-02 RX ADMIN — APIXABAN 5 MG: 5 TABLET, FILM COATED ORAL at 18:12

## 2020-12-02 RX ADMIN — ALUMINUM HYDROXIDE, MAGNESIUM HYDROXIDE, AND SIMETHICONE 30 ML: 200; 200; 20 SUSPENSION ORAL at 12:11

## 2020-12-02 RX ADMIN — SUCRALFATE 1 G: 1 TABLET ORAL at 08:12

## 2020-12-02 RX ADMIN — Medication 10 ML: at 13:17

## 2020-12-02 RX ADMIN — CARVEDILOL 3.12 MG: 3.12 TABLET, FILM COATED ORAL at 18:12

## 2020-12-02 RX ADMIN — SUCRALFATE 1 G: 1 TABLET ORAL at 12:11

## 2020-12-02 RX ADMIN — Medication 10 ML: at 21:29

## 2020-12-02 RX ADMIN — ASPIRIN 81 MG CHEWABLE TABLET 81 MG: 81 TABLET CHEWABLE at 08:12

## 2020-12-02 RX ADMIN — APIXABAN 5 MG: 5 TABLET, FILM COATED ORAL at 08:12

## 2020-12-02 RX ADMIN — SUCRALFATE 1 G: 1 TABLET ORAL at 22:00

## 2020-12-02 NOTE — PROGRESS NOTES
Problem: Falls - Risk of  Goal: *Absence of Falls  Description: Document Vadim Tomas Fall Risk and appropriate interventions in the flowsheet.   Outcome: Progressing Towards Goal  Note: Fall Risk Interventions:            Medication Interventions: Teach patient to arise slowly                   Problem: Patient Education: Go to Patient Education Activity  Goal: Patient/Family Education  Outcome: Progressing Towards Goal

## 2020-12-02 NOTE — PROGRESS NOTES
Bedside shift change report given to eNly Hare RN (oncoming nurse) by Nikki Frazier RN (offgoing nurse). Report included the following information SBAR, Kardex, Intake/Output, MAR and Recent Results.

## 2020-12-02 NOTE — ACP (ADVANCE CARE PLANNING)
Advance Care Planning     Advance Care Planning Clinical Specialist  Conversation Note      Date of ACP Conversation: 12/02/20    Conversation Conducted with:  Patient with Decision Making Capacity    ACP Clinical Specialist: Neville Nguyen Decision Maker: NO    Current Designated Health Care Decision Maker: N/A    If no Decision Maker listed above or available through scanned documents, then:    If no Authorized Decision Maker has previously been identified, then patient chooses Health Care Decision Maker:  \"Who would you like to name as your primary health care decision-maker? \"    Name: Belkys Mariee   Relationship: Sister     Phone number: [phone]  Adam Lorenzo this person be reached easily? \" YES  \"Who would you like to name as your back-up decision maker? \"   Name: Daria Alvarado       Relationship: Sister     Phone number: [phone]  Adam Mcnamaraat this person be reached easily? \" YES    Care Preferences    Hospitalization: \"If your health worsens and it becomes clear that your chance of recovery is unlikely, what would your preference be regarding hospitalization? \"    Choice:  [x]  The patient wants hospitalization  []  The patient prefers comfort-focused treatment without hospitalization. [] Yes  [x] No   Educated Patient / Gale Roberson regarding differences between Advance Directives and portable DNR orders.     Length of ACP Conversation in minutes:      Conversation Outcomes:  [x] ACP discussion completed  [] Existing advance directive reviewed with patient; no changes to patient's previously recorded wishes   [x] New Advance Directive completed   [] Portable Do Not Resuscitate prepared for Provider review and signature  [] POLST/POST/MOLST/MOST prepared for Provider review and signature      Follow-up plan:    [] Schedule follow-up conversation to continue planning  [] Referred individual to Provider for additional questions/concerns   [] Advised patient/agent/surrogate to review completed ACP document and update if needed with changes in condition, patient preferences or care setting     [] This note routed to one or more involved healthcare providers      Sam Partida MSW  Care Manager  Pager#: [phone]

## 2020-12-02 NOTE — PROGRESS NOTES
[facility] - Progress Note  Admit Date: 12/1/2020    Assessment:     Hospital Problems  Date Reviewed: 2/19/2014          Codes Class Noted POA    A-fib [facility]) ICD-10-CM: I48.91  ICD-9-CM: 427.31  12/1/2020 Unknown               -Chest pain atypical somewhat pleuritic but also possible component of GI related given GI history and recent ETOH consumption. Initial troponin unremarkable. ECG with LAD, PRWP and nonspecific ST wave abnormalities similar to previous tracings. Echo unchanged this admission, EF <15% with global dysfunction and no pericardial effusion.  -Paroxysmal atrial fibrillation recently PAF during admission for CHF 10/2020, remote PAF during GIB 2014, presented with PAF with RVR this admission with borderline BP. On coreg and Eliquis as outpatient.  -Chronic systolic heart failure, would not aggressively diurese in setting of borderline hemodynamics. -Newly diagnosed nonischemic cardiomyopathy with EF 15% by echo 10/18/20. No longer wearing lifevest as patients insurance did not approve and hospital could only provide for 6 weeks. -s/p heart cath 10/19/20 with 90% ostial ramus, medical therapy. On ASA, statin and BB as outpatient.  -Non-Hodgkins lymphoma with gastric tumor. Received CHOP, no radiation and has been in remission. Followed by Dr. Parvin Marx. -h/o remote GIB 2014. -GI bleed, s/p EGD 12/30/13 with active bleeding, unsuccessful epinephrine injections   -s/p mesenteric angiogram with coil embolization to gastric artery 12/30/13   -Hepatitis B/C positive. -ETOH use. Reports to drinking 5-6 mixed drinks last night.     Patient to establish care with Dr. Roxanne Ozuna. Plan:     Independently seen and evaluated. Agree with below. Patient's atypical chest discomfort has not completely resolved. No further coronary evaluation needed at this time for chest pain. He does have severe LV dysfunction. Agree with complete alcohol abstinence.   Increase activity as tolerated. Chest pain (now resolved) was atypical and not consistent with ACS. Troponin unremarkable on administration. Echo unchanged this admission, EF <15% with global dysfunction and no pericardial effusion. Continue GI treatment, ETOH cessation was encouraged. Patient remains in afib with HR low 100s by tele despite amiodarone 24 hour loading, BP improving but remains borderline. Will plan to focus on rate control, will restart coreg as BP allows. Reasonable to continue amiodarone for today, but if remains in afib overnight will plan to discontinue amiodarone infusion and load with digoxin if further HR control is needed. Patient is continued on Eliquis. Patient is continued on ASA and statin. No ace or arb historically given low BP. No diuretics historically given borderline BP. Subjective:     Chest pain resolved.     Objective:      Patient Vitals for the past 8 hrs:   Temp Pulse Resp BP SpO2   12/02/20 0756 96.8 °F (36 °C) 99 20 117/83 97 %   12/02/20 0423 98.1 °F (36.7 °C) 88 18 125/80 96 %         Patient Vitals for the past 96 hrs:   Weight   12/01/20 0917 230 lb (104.3 kg)                    Intake/Output Summary (Last 24 hours) at 12/2/2020 2364  Last data filed at 12/1/2020 2152  Gross per 24 hour   Intake 200 ml   Output --   Net 200 ml       Physical Exam:  General:  alert, cooperative, no distress, appears stated age  Neck:  no JVD  Lungs:  clear to auscultation bilaterally  Heart:  irregularly irregular rhythm  Abdomen:  abdomen is soft without significant tenderness, masses, organomegaly or guarding  Extremities:  extremities normal, atraumatic, no cyanosis or edema    Data Review:     Labs: Results:       Chemistry Recent Labs     12/02/20  0533 12/01/20  0427   * 120*   * 138   K 3.8 4.3    106   CO2 25 27   BUN 26* 20*   CREA 1.39* 1.25   CA 8.8 9.4   MG 2.0  --    AGAP 5 5   BUCR 19 16   AP  --  105   TP  --  8.3*   ALB  --  3.9   GLOB  --  4.4*   AGRAT  -- 0. 9      CBC w/Diff Recent Labs     12/02/20  0533 12/01/20  0326   WBC 14.8* 16.0*   RBC 4.62* 5.06   HGB 14.4 15.9   HCT 42.0 45.3    216   GRANS 68 74*   LYMPH 18* 15*   EOS 1 1      Cardiac Enzymes No results found for: CPK, CK, CKMMB, CKMB, RCK3, CKMBT, CKNDX, CKND1, FLY, TROPT, TROIQ, SUMIT, TROPT, TNIPOC, BNP, BNPP   Coagulation No results for input(s): PTP, INR, APTT, INREXT in the last 72 hours.     Lipid Panel No results found for: CHOL, CHOLPOCT, CHOLX, CHLST, CHOLV, 948285, HDL, HDLP, LDL, LDLC, DLDLP, 190226, VLDLC, VLDL, TGLX, TRIGL, TRIGP, TGLPOCT, CHHD, CHHDX   BNP No results found for: BNP, BNPP, XBNPT   Liver Enzymes Recent Labs     12/01/20  0427   TP 8.3*   ALB 3.9         Digoxin    Thyroid Studies Lab Results   Component Value Date/Time    TSH 1.85 10/17/2020 10:09 AM          Signed By: MICHELLE Pollard     December 2, 2020

## 2020-12-02 NOTE — PROGRESS NOTES
Reason for Admission:  A-fib ([facility] [address] [I48.91]                 RUR Score:   16%           Plan for utilizing home health:  Patient has no home health orders, in place, at this time. Patient is open to using [facility], if needed. Care management will continue to monitor for potential home health needs and orders. Likelihood of Readmission:   LOW                         Transition of Care Plan:  Patient will return home with help from his family. He will drive himself home, upon discharge. Initial assessment completed with patient. Cognitive status of patient: Alert and oriented. Face sheet information confirmed:  yes. The patient designates his sisters Ramírez Eller and Zulma Joe) to participate in his discharge plan and to receive any needed information. This patient lives in a house with his sister Ramírez Eller). Patient is able to navigate steps as needed. Prior to hospitalization, patient was considered to be independent with ADLs/IADLS : yes . Patient has a current ACP document on file: no. This writer completed an ACP, with patient. Patient will transport himself home upon discharge. The patient already has a walker, a cane and a wheelchair, available in the home. Patient is not currently active with home health. Patient has not stayed in a skilled nursing facility or rehab. This patient is on dialysis :no    Freedom of choice signed: yes, for [facility]. Currently, the discharge plan is for patient to return home with help from his family. He will transport himself home, upon discharge. Patient's sister/primary decision maker is (Amandeep Bass, #[phone]). Patient's other sister is (Alessandro Anaya, #[phone]). Patient does not have a PCP and will need one set up, prior to discharge. Patient is insured through [facility] and he also has Medicare A.      The patient states that he can obtain his medications from the [facility] pharmacy Emilie Gonzalez), and take his medications as directed. This writer will continue to monitor for discharge planning to ensure a safe discharge home from [facility]. Care Management Interventions  PCP Verified by CM: No(Patient will need a PCP set up for him.)  Palliative Care Criteria Met (RRAT>21 & CHF Dx)?: No  Mode of Transport at Discharge: Self  Transition of Care Consult (CM Consult): Discharge Planning  Current Support Network: Own Home, Family Lives Nearby(His sister Zeina Hammer) lives with him.)  Confirm Follow Up Transport: Self  Discharge Location  Discharge Placement: Home with family assistance      Sam Nobles MSW  Care Manager  Pager#: [phone]

## 2020-12-02 NOTE — PROGRESS NOTES
[facility]  Progress Note    Patient: Clifton Rodriguez Sr. Age: 72 y.o. : 1950 MR#: [id number] SSN: [id number]  Date: 2020     Subjective:     Abdominal discomfort resolved; reports chronic sinus congestion unchanged from his normal, denies shortness of breath, chest pain, palpitations    Assessment/Plan:   1. Paroxysmal atrial fibrillation with RVR -continue amiodarone drip per cardiology /heart rate stable and much improved  2. Hypotension, resolved -in setting of Cardizem drip yesterday /now resolved /borderline low BP at baseline  3. Chronic systolic heart failure, EF 15% -no evidence of acute exacerbation  /not on home diuretics due to borderline blood pressure  4. Epigastric pain, possible alcohol gastritis or pancreatitis -resolved, abdominal CT reviewed no evidence of acute process. 5. Leukocytosis - improved / hold off on antibiotics at present  6. Potential bronchitis versus atelectasis - patient given inc spirometer / Dr. Jacquelyn Mayorga to evaluate for any need for antibiotics  7. Alcohol use - prior to admission reports drinking 6 mixed drinks which is out of the ordinary for him /alcohol level less than 3 on admission and no evidence of withdrawal  / encourage cessation going forward   8. H/o GI bleed - no current evidence  9. Non-hodgkin's lymphoma s/p CHOP treatment - in    10. Hyperglycemia, not diabetic.  HgbA1c 5.8%     Additional Notes:      Case discussed with:  [x]Patient  []Family  [x]Nursing  []Case Management  DVT Prophylaxis:  []Lovenox  [x] eliquis  []SCDs  []Coumadin   []On Heparin gtt    Objective:   VS:   Visit Vitals  /80 (BP 1 Location: Right arm, BP Patient Position: At rest)   Pulse 88   Temp 98.1 °F (36.7 °C)   Resp 18   Ht 5' 10\" (1.778 m)   Wt 104.3 kg (230 lb)   SpO2 96%   BMI 33.00 kg/m²      Tmax/24hrs: Temp (24hrs), Av.6 °F (37 °C), Min:98 °F (36.7 °C), Max:99.2 °F (37.3 °C)      Intake/Output Summary (Last 24 hours) at 12/2/2020 0732  Last data filed at 12/1/2020 1810  Gross per 24 hour   Intake 0 ml   Output --   Net 0 ml     General:  Alert, NAD, ambulating in room without difficulty  Cardiovascular: Irregular rate and rhythm ; HR well controlled   Pulmonary:  LSC throughout  GI:  +BS in all four quadrants, soft, no tenderness including to epigastric region  Extremities:  No edema; 2+ dorsalis pedis pulses bilaterally  Neuro: alert and oriented x 4    Family contact: Sister Marcia Garrett   [phone]      Labs:    Recent Results (from the past 24 hour(s))   ECHO ADULT FOLLOW-UP OR LIMITED    Collection Time: 12/01/20  9:43 AM   Result Value Ref Range    IVSd 1.19 (A) 0.6 - 1.0 cm    LVIDd 5.64 4.2 - 5.9 cm    LVIDs 5.14 cm    LVPWd 1.21 (A) 0.6 - 1.0 cm    LV Mass .7 88 - 224 g    LV Mass AL Index 128.1 49 - 115 g/m2   CBC WITH AUTOMATED DIFF    Collection Time: 12/02/20  5:33 AM   Result Value Ref Range    WBC 14.8 (H) 4.6 - 13.2 K/uL    RBC 4.62 (L) 4.70 - 5.50 M/uL    HGB 14.4 13.0 - 16.0 g/dL    HCT 42.0 36.0 - 48.0 %    MCV 90.9 74.0 - 97.0 FL    MCH 31.2 24.0 - 34.0 PG    MCHC 34.3 31.0 - 37.0 g/dL    RDW 14.6 (H) 11.6 - 14.5 %    PLATELET 365 149 - 353 K/uL    MPV 11.5 9.2 - 11.8 FL    NEUTROPHILS 68 40 - 73 %    LYMPHOCYTES 18 (L) 21 - 52 %    MONOCYTES 13 (H) 3 - 10 %    EOSINOPHILS 1 0 - 5 %    BASOPHILS 0 0 - 2 %    ABS. NEUTROPHILS 10.0 (H) 1.8 - 8.0 K/UL    ABS. LYMPHOCYTES 2.6 0.9 - 3.6 K/UL    ABS. MONOCYTES 2.0 (H) 0.05 - 1.2 K/UL    ABS. EOSINOPHILS 0.1 0.0 - 0.4 K/UL    ABS.  BASOPHILS 0.0 0.0 - 0.1 K/UL    DF AUTOMATED     METABOLIC PANEL, BASIC    Collection Time: 12/02/20  5:33 AM   Result Value Ref Range    Sodium 135 (L) 136 - 145 mmol/L    Potassium 3.8 3.5 - 5.5 mmol/L    Chloride 105 100 - 111 mmol/L    CO2 25 21 - 32 mmol/L    Anion gap 5 3.0 - 18 mmol/L    Glucose 120 (H) 74 - 99 mg/dL    BUN 26 (H) 7.0 - 18 MG/DL    Creatinine 1.39 (H) 0.6 - 1.3 MG/DL    BUN/Creatinine ratio 19 12 - 20      GFR est AA >60 >60 ml/min/1.73m2    GFR est non-AA 51 (L) >60 ml/min/1.73m2    Calcium 8.8 8.5 - 10.1 MG/DL   MAGNESIUM    Collection Time: 12/02/20  5:33 AM   Result Value Ref Range    Magnesium 2.0 1.6 - 2.6 mg/dL       Signed By: Pascual Bower NP     December 2, 2020

## 2020-12-02 NOTE — PROGRESS NOTES
Problem: Falls - Risk of  Goal: *Absence of Falls  Description: Document Saira Mccoy Fall Risk and appropriate interventions in the flowsheet.   Outcome: Progressing Towards Goal  Note: Fall Risk Interventions:            Medication Interventions: Teach patient to arise slowly                   Problem: Patient Education: Go to Patient Education Activity  Goal: Patient/Family Education  Outcome: Progressing Towards Goal

## 2020-12-02 NOTE — PROGRESS NOTES
Problem: Falls - Risk of  Goal: *Absence of Falls  Description: Document She Griffin Fall Risk and appropriate interventions in the flowsheet.   Outcome: Progressing Towards Goal  Note: Fall Risk Interventions:            Medication Interventions: Teach patient to arise slowly                   Problem: Patient Education: Go to Patient Education Activity  Goal: Patient/Family Education  Outcome: Progressing Towards Goal

## 2020-12-02 NOTE — PROGRESS NOTES
Spoke with [employer].   He stated with pt's insurance, he will qualify for CARROL SilvaN RN  Care Management  Pager: [phone]

## 2020-12-03 VITALS
HEIGHT: 70 IN | RESPIRATION RATE: 17 BRPM | WEIGHT: 236.3 LBS | SYSTOLIC BLOOD PRESSURE: 106 MMHG | OXYGEN SATURATION: 93 % | BODY MASS INDEX: 33.83 KG/M2 | TEMPERATURE: 98.9 F | DIASTOLIC BLOOD PRESSURE: 64 MMHG | HEART RATE: 96 BPM

## 2020-12-03 LAB
ANION GAP SERPL CALC-SCNC: 5 MMOL/L (ref 3–18)
BASOPHILS # BLD: 0 K/UL (ref 0–0.1)
BASOPHILS NFR BLD: 0 % (ref 0–2)
BUN SERPL-MCNC: 19 MG/DL (ref 7–18)
BUN/CREAT SERPL: 16 (ref 12–20)
CALCIUM SERPL-MCNC: 8.8 MG/DL (ref 8.5–10.1)
CHLORIDE SERPL-SCNC: 106 MMOL/L (ref 100–111)
CO2 SERPL-SCNC: 25 MMOL/L (ref 21–32)
CREAT SERPL-MCNC: 1.2 MG/DL (ref 0.6–1.3)
DIFFERENTIAL METHOD BLD: ABNORMAL
EOSINOPHIL # BLD: 0.3 K/UL (ref 0–0.4)
EOSINOPHIL NFR BLD: 3 % (ref 0–5)
ERYTHROCYTE [DISTWIDTH] IN BLOOD BY AUTOMATED COUNT: 14.5 % (ref 11.6–14.5)
GLUCOSE SERPL-MCNC: 120 MG/DL (ref 74–99)
HCT VFR BLD AUTO: 43 % (ref 36–48)
HGB BLD-MCNC: 14.4 G/DL (ref 13–16)
LYMPHOCYTES # BLD: 2.7 K/UL (ref 0.9–3.6)
LYMPHOCYTES NFR BLD: 23 % (ref 21–52)
MCH RBC QN AUTO: 30.6 PG (ref 24–34)
MCHC RBC AUTO-ENTMCNC: 33.5 G/DL (ref 31–37)
MCV RBC AUTO: 91.3 FL (ref 74–97)
MONOCYTES # BLD: 1.2 K/UL (ref 0.05–1.2)
MONOCYTES NFR BLD: 10 % (ref 3–10)
NEUTS SEG # BLD: 7.5 K/UL (ref 1.8–8)
NEUTS SEG NFR BLD: 64 % (ref 40–73)
PLATELET # BLD AUTO: 182 K/UL (ref 135–420)
PMV BLD AUTO: 12.3 FL (ref 9.2–11.8)
POTASSIUM SERPL-SCNC: 4.1 MMOL/L (ref 3.5–5.5)
RBC # BLD AUTO: 4.71 M/UL (ref 4.7–5.5)
SODIUM SERPL-SCNC: 136 MMOL/L (ref 136–145)
WBC # BLD AUTO: 11.7 K/UL (ref 4.6–13.2)

## 2020-12-03 PROCEDURE — 99238 HOSP IP/OBS DSCHRG MGMT 30/<: CPT | Performed by: PHYSICIAN ASSISTANT

## 2020-12-03 PROCEDURE — 74011250636 HC RX REV CODE- 250/636: Performed by: PHYSICIAN ASSISTANT

## 2020-12-03 PROCEDURE — 80048 BASIC METABOLIC PNL TOTAL CA: CPT

## 2020-12-03 PROCEDURE — 74011250637 HC RX REV CODE- 250/637: Performed by: NURSE PRACTITIONER

## 2020-12-03 PROCEDURE — 85025 COMPLETE CBC W/AUTO DIFF WBC: CPT

## 2020-12-03 PROCEDURE — 99232 SBSQ HOSP IP/OBS MODERATE 35: CPT | Performed by: INTERNAL MEDICINE

## 2020-12-03 PROCEDURE — 74011250637 HC RX REV CODE- 250/637: Performed by: INTERNAL MEDICINE

## 2020-12-03 PROCEDURE — 74011250637 HC RX REV CODE- 250/637: Performed by: PHYSICIAN ASSISTANT

## 2020-12-03 PROCEDURE — 36415 COLL VENOUS BLD VENIPUNCTURE: CPT

## 2020-12-03 RX ADMIN — CARVEDILOL 3.12 MG: 3.12 TABLET, FILM COATED ORAL at 08:18

## 2020-12-03 RX ADMIN — Medication 10 ML: at 07:18

## 2020-12-03 RX ADMIN — ALUMINUM HYDROXIDE, MAGNESIUM HYDROXIDE, AND SIMETHICONE 30 ML: 200; 200; 20 SUSPENSION ORAL at 11:51

## 2020-12-03 RX ADMIN — SUCRALFATE 1 G: 1 TABLET ORAL at 08:18

## 2020-12-03 RX ADMIN — AMIODARONE HYDROCHLORIDE 0.5 MG/MIN: 1.8 INJECTION, SOLUTION INTRAVENOUS at 07:31

## 2020-12-03 RX ADMIN — ALUMINUM HYDROXIDE, MAGNESIUM HYDROXIDE, AND SIMETHICONE 30 ML: 200; 200; 20 SUSPENSION ORAL at 08:18

## 2020-12-03 RX ADMIN — APIXABAN 5 MG: 5 TABLET, FILM COATED ORAL at 08:18

## 2020-12-03 RX ADMIN — ASPIRIN 81 MG CHEWABLE TABLET 81 MG: 81 TABLET CHEWABLE at 08:18

## 2020-12-03 RX ADMIN — SUCRALFATE 1 G: 1 TABLET ORAL at 11:51

## 2020-12-03 NOTE — PROGRESS NOTES
Pt's face sheet faxed to [employer] for life vest.  Spoke with [place]. He stated pt can go home and they will fit pt at home.       CARROL ReynoldsN RN  Care Management  Pager: [phone]

## 2020-12-03 NOTE — PROGRESS NOTES
[facility] - Progress Note  Admit Date: 12/1/2020    Assessment:     Hospital Problems  Date Reviewed: 2/19/2014          Codes Class Noted POA    A-fib [facility]) ICD-10-CM: I48.91  ICD-9-CM: 427.31  12/1/2020 Unknown                 -Chest pain atypical somewhat pleuritic but also possible component of GI related given GI history and recent ETOH consumption. Initial troponin unremarkable. ECG with LAD, PRWP and nonspecific ST wave abnormalities similar to previous tracings. Echo unchanged this admission, EF <15% with global dysfunction and no pericardial effusion.  -Paroxysmal atrial fibrillation recently PAF during admission for CHF 10/2020, remote PAF during GIB 2014, presented with PAF with RVR this admission with borderline BP. On coreg and Eliquis as outpatient.  -Chronic systolic heart failure, would not aggressively diurese in setting of borderline hemodynamics. -Newly diagnosed nonischemic cardiomyopathy with EF 15% by echo 10/18/20. No longer wearing lifevest as patients insurance did not approve and hospital could only provide for 6 weeks. -s/p heart cath 10/19/20 with 90% ostial ramus, medical therapy. On ASA, statin and BB as outpatient.  -Non-Hodgkins lymphoma with gastric tumor. Received CHOP, no radiation and has been in remission. Followed by Dr. Jennifer Moy. -h/o remote GIB 2014. -GI bleed, s/p EGD 12/30/13 with active bleeding, unsuccessful epinephrine injections   -s/p mesenteric angiogram with coil embolization to gastric artery 12/30/13   -Hepatitis B/C positive. -ETOH use. Reports to drinking 5-6 mixed drinks last night.     Patient to establish care with Dr. Jennifer Remy.         Plan:     Independently seen and evaluated. Agree with below. His heart rate is controlled despite the fact that he remains in atrial fibrillation. He can be discharged home today for outpatient follow-up. Remains in afib with controlled HR. Will continue to focus on rate control.  Will stop amio and continue coreg. Patient is continued on Eliquis. Patient is continued on ASA and statin. Patient historically not on ace/arb/diuretics given low BP. Given patient now has medicare, will ask to get lifevest approved again, no need to keep in house for aproval process, lifevest can also be fitted at home. No further cardiac work up. Will plan office follow up. Subjective:     No CP.  Tele controlled afib    Objective:      Patient Vitals for the past 8 hrs:   Temp Pulse Resp BP SpO2   12/03/20 0745 97.8 °F (36.6 °C) 90 16 107/76 95 %   12/03/20 0414 98.6 °F (37 °C) 88 17 123/68 97 %         Patient Vitals for the past 96 hrs:   Weight   12/02/20 1439 236 lb 4.8 oz (107.2 kg)   12/01/20 0917 230 lb (104.3 kg)                    Intake/Output Summary (Last 24 hours) at 12/3/2020 1039  Last data filed at 12/3/2020 0820  Gross per 24 hour   Intake 2043.35 ml   Output 300 ml   Net 1743.35 ml       Physical Exam:  General:  alert, cooperative, no distress, appears stated age  Neck:  no JVD  Lungs:  clear to auscultation bilaterally  Heart:  irregularly irregular rhythm  Abdomen:  abdomen is soft without significant tenderness, masses, organomegaly or guarding  Extremities:  extremities normal, atraumatic, no cyanosis or edema    Data Review:     Labs: Results:       Chemistry Recent Labs     12/03/20  0320 12/02/20  0533 12/01/20  0427   * 120* 120*    135* 138   K 4.1 3.8 4.3    105 106   CO2 25 25 27   BUN 19* 26* 20*   CREA 1.20 1.39* 1.25   CA 8.8 8.8 9.4   MG  --  2.0  --    AGAP 5 5 5   BUCR 16 19 16   AP  --   --  105   TP  --   --  8.3*   ALB  --   --  3.9   GLOB  --   --  4.4*   AGRAT  --   --  0.9      CBC w/Diff Recent Labs     12/03/20  0320 12/02/20  0533 12/01/20  0326   WBC 11.7 14.8* 16.0*   RBC 4.71 4.62* 5.06   HGB 14.4 14.4 15.9   HCT 43.0 42.0 45.3    187 216   GRANS 64 68 74*   LYMPH 23 18* 15*   EOS 3 1 1      Cardiac Enzymes No results found for: CPK, CK, CKMMB, CKMB, RCK3, CKMBT, CKNDX, CKND1, FLY, TROPT, TROIQ, SUMIT, TROPT, TNIPOC, BNP, BNPP   Coagulation No results for input(s): PTP, INR, APTT, INREXT in the last 72 hours.     Lipid Panel No results found for: CHOL, CHOLPOCT, CHOLX, CHLST, CHOLV, 998643, HDL, HDLP, LDL, LDLC, DLDLP, 015444, VLDLC, VLDL, TGLX, TRIGL, TRIGP, TGLPOCT, CHHD, CHHDX   BNP No results found for: BNP, BNPP, XBNPT   Liver Enzymes Recent Labs     12/01/20  0427   TP 8.3*   ALB 3.9         Digoxin    Thyroid Studies Lab Results   Component Value Date/Time    TSH 1.85 10/17/2020 10:09 AM          Signed By: MICHELLE Pollard     December 3, 2020

## 2020-12-03 NOTE — PROGRESS NOTES
Problem: Falls - Risk of  Goal: *Absence of Falls  Description: Document Oleg Hopkins Fall Risk and appropriate interventions in the flowsheet.   Outcome: Progressing Towards Goal  Note: Fall Risk Interventions:            Medication Interventions: Bed/chair exit alarm                   Problem: Patient Education: Go to Patient Education Activity  Goal: Patient/Family Education  Outcome: Progressing Towards Goal     Problem: Pain  Goal: *Control of Pain  Outcome: Progressing Towards Goal  Goal: *PALLIATIVE CARE:  Alleviation of Pain  Outcome: Progressing Towards Goal     Problem: Patient Education: Go to Patient Education Activity  Goal: Patient/Family Education  Outcome: Progressing Towards Goal

## 2020-12-03 NOTE — PROGRESS NOTES
conducted an initial consultation and Spiritual Assessment for Giovanni Marin, who is a 72 y.o.,male. Patient's Primary Language is: [place]. According to the patient's EMR Jew Affiliation is: Lewis Paul.     The reason the Patient came to the hospital is:   Patient Active Problem List    Diagnosis Date Noted   [facility]) 12/01/2020    CHF (congestive heart failure) [facility] 10/19/2020    Acute congestive heart failure ([address] 10/17/2020    New onset atrial fibrillation ([address] 10/17/2020    Shortness of breath 10/17/2020    CHF exacerbation ([facility] [address] 10/17/2020    Hematemesis 10/13/2013    Anemia 10/13/2013    Lymphoma ([facility] [address] 10/13/2013    Abdominal pain 08/10/2013    Hepatitis C 11/05/2012        The  provided the following Interventions:  Initiated a relationship of care and support. Explored issues of russ, belief, spirituality and Worship/ritual needs while hospitalized. Listened empathically. Provided information about Spiritual Care Services. Offered prayer and assurance of continued prayers on patient's behalf. Chart reviewed. The following outcomes where achieved:  Patient shared limited information about both their medical narrative and spiritual journey/beliefs. Patient processed feeling about current hospitalization. Patient expressed gratitude for 's visit. Assessment:  Patient does not have any Worship/cultural needs that will affect patient's preferences in health care. There are no spiritual or Worship issues which require intervention at this time. Plan:  Chaplains will continue to follow and will provide pastoral care on an as needed/requested basis.  recommends bedside caregivers page  on duty if patient shows signs of acute spiritual or emotional distress.     [address] [place] [address]   [phone]

## 2020-12-03 NOTE — DISCHARGE SUMMARY
Physician Discharge Summary       Patient: Espinoza Ren Sr. MRN: [id number]  SSN: [id number]    YOB: 1950  Age: 72 y.o. Sex: male    PCP: None    Allergies: Codeine; Acetaminophen; and Lidocaine    Admit date: 12/1/2020  Admitting Provider: Shoaib Miramontes MD    Discharge date: 12/3/2020  Discharging Provider: MICHELLE Aviles    * Admission Diagnoses: A-fib [facility]) [I48.91]    * Discharge Diagnoses:    Paroxysmal atrial fibrillation with RVR  Hypotension, resolved. Chronic systolic heart failure with EF 15% no evidence of exacerbation   Epigastric pain- resolved   Leukocytosis - resolved   Alcohol use   Hx of GIB   Non-hodgkin's lymphoma s/p CHOP treatment - in 2013/2014   Hyperglycemia, not diabetic. HgbA1c 5.8% , resolved     HPI per admitting MD: Jhoana Zelaya is a 72 y.o.  male who presents with sharp midsternal chest pain since last night at approximately 9 PM.  Pain had improved with morphine earlier today but no symptom of pain has resumed. Patient relates that his chest pain started after he took nasal Mucinex and he also drank 6 mixed drinks yesterday. Reports current pain is not as much like a pressure is with prior admission. Reports mild intermittent nausea and no radiation of pain into shoulder or jaw, no current palpitations. Patient finds it difficult to take deep breath due to pain. He also relates he is having some shortness of breath. Reports mild congestion for which he took the Mucinex last night but otherwise no sinus pain, no cough, no fevers chills or urinary complaints. Denies any loose stool or abdominal pain. No other complaints including he denies any reflux type symptoms, no joint pain or swelling. He reports he rarely drinks as much alcohol as he did yesterday will generally drink 1-2 drinks per week. [place] tobacco or illicit drug use.   He also states he had to return the LifeVest that was ordered from last admission due to nonpayment for his insurance. [facility] Course: Mr. Nayeli Copeland is a 71 yo male who presented on 12/1 with chest pain. He was found to be in afib with RVR and  He was admitted for further care. Hospital course by problem as follows:     # AFIB with RVR:   Cardiology was consulted-was started on an amiodarone drip. He remained in afib with controlled HRs. The amiodarone was stopped and coreg was continued in addition to eliquis, asa and statin. The patient is not on ACEI/ARB/ diuretics given low BPs in the past.   The patient needs a LifeVest. He did not have health insurance in the past but he now has medicare, per Cardiology will ask to get lifevest approved again, no need to keep in house for aproval process, lifevest can also be fitted at home. No further cardiac work up was recommended and needs to follow up with [facility] as an outpatient. #  Chronic systolic heart failure, EF 15% -no evidence of acute exacerbation  /not on home diuretics due to borderline blood pressure    # Epigastric pain, possible alcohol gastritis or pancreatitis -resolved, abdominal CT reviewed no evidence of acute process. # Leukocytosis - resolved without any antibiotics . No fever.      # Alcohol use - prior to admission reports drinking 6 mixed drinks which is out of the ordinary for him /alcohol level less than 3 on admission and no evidence of withdrawal  / encourage cessation going forward     * Procedures: none   * No surgery found *      Consults: Cardiology Dr Charles Oden     Significant Diagnostic Studies:  \  Recent Results (from the past 24 hour(s))   METABOLIC PANEL, BASIC    Collection Time: 12/03/20  3:20 AM   Result Value Ref Range    Sodium 136 136 - 145 mmol/L    Potassium 4.1 3.5 - 5.5 mmol/L    Chloride 106 100 - 111 mmol/L    CO2 25 21 - 32 mmol/L    Anion gap 5 3.0 - 18 mmol/L    Glucose 120 (H) 74 - 99 mg/dL    BUN 19 (H) 7.0 - 18 MG/DL    Creatinine 1.20 0.6 - 1.3 MG/DL    BUN/Creatinine ratio 16 12 - 20      GFR est AA >60 >60 ml/min/1.73m2    GFR est non-AA >60 >60 ml/min/1.73m2    Calcium 8.8 8.5 - 10.1 MG/DL   CBC WITH AUTOMATED DIFF    Collection Time: 12/03/20  3:20 AM   Result Value Ref Range    WBC 11.7 4.6 - 13.2 K/uL    RBC 4.71 4.70 - 5.50 M/uL    HGB 14.4 13.0 - 16.0 g/dL    HCT 43.0 36.0 - 48.0 %    MCV 91.3 74.0 - 97.0 FL    MCH 30.6 24.0 - 34.0 PG    MCHC 33.5 31.0 - 37.0 g/dL    RDW 14.5 11.6 - 14.5 %    PLATELET 018 411 - 387 K/uL    MPV 12.3 (H) 9.2 - 11.8 FL    NEUTROPHILS 64 40 - 73 %    LYMPHOCYTES 23 21 - 52 %    MONOCYTES 10 3 - 10 %    EOSINOPHILS 3 0 - 5 %    BASOPHILS 0 0 - 2 %    ABS. NEUTROPHILS 7.5 1.8 - 8.0 K/UL    ABS. LYMPHOCYTES 2.7 0.9 - 3.6 K/UL    ABS. MONOCYTES 1.2 0.05 - 1.2 K/UL    ABS. EOSINOPHILS 0.3 0.0 - 0.4 K/UL    ABS. BASOPHILS 0.0 0.0 - 0.1 K/UL    DF AUTOMATED       CT Results (most recent):  Results from [place] encounter on 12/01/20   CT ABD PELV W CONT    Narrative CT ABDOMEN AND PELVIS WITH CONTRAST    COMPARISON: CTs most recently June 2016; current and previous plain films. INDICATIONS: Epigastric abdominal pain, leukocytosis. TECHNIQUE:  Following the uneventful administration of oral and 100cc of Isovue  300 intravenous contrast , volumetric data acquisition was performed of the  abdomen and pelvis on a multislice scanner and reconstructed in axial coronal  and sagittal planes    CT ABDOMEN FINDINGS:     Lung Bases: Strands of subsegmental atelectasis or fibrosis are present in the  bases. .    Liver/Gallbladder/Biliary: There is fatty infiltration with some focal sparing  most prominent in the region of the right lobe and gallbladder. Biliary tree is  not dilated. Ronette Rudder Spleen: Absent. Adrenal Glands: Normal.    Kidneys: Normal.    Pancreas: April. Stomach, Small Bowel,  and Colon: Normal stomach and small bowel. Normal  appendix. Diverticulosis without diverticulitis. Lymph Nodes: Normal in size and number. The abdominal aorta is unremarkable. The IVC is unremarkable. Peritoneal Spaces: No free fluid or free air is present. Abdominal wall: No hernia or mass is evident    Bladder: Nearly empty without abnormality apparent. Osseous Structures Of Abdomen And Pelvis: Unremarkable for age. Impression IMPRESSION:     Straight densities of subsegmental atelectasis or fibrosis are evident in the  lung bases. Potentially bronchitis with minimal bronchopneumonia or aspiration;  favor nonspecific atelectasis from hypoventilation. Fatty infiltration of the liver. Diverticulosis without diverticulitis        All CT scans at this facility are performed using dose optimization technique as  appropriate to the performed exam, to include automated exposure control,  adjustment of the mA and/or kV according to patient's size (Including  appropriate matching for site-specific examinations), or use of iterative  reconstruction technique. XR Results (most recent):  Results from Hospital Encounter encounter on 12/01/20   XR CHEST PORT    Narrative EXAM: CHEST ONE VIEW  portable 0302 hours    CLINICAL HISTORY/INDICATION: Central upper chest pain radiates through to the  upper back, exacerbated with inhalation, history of prior non-Hodgkin's lymphoma    COMPARISON: Chest x-ray October 17, 2020, January 19, 2018. TECHNIQUE: One view obtained. FINDINGS:     The cardiac silhouette is normal. Mild tortuosity of the aortic arch and  descending thoracic aorta. The lungs are clear. Pulmonary vascularity is normal.  The costophrenic angles are sharply defined. Embolization coils in the left  medial upper quadrant of the abdomen. .      Impression IMPRESSION:    No acute finding.            Discharge Exam:  General:  Alert, NAD, ambulating in room without difficulty  Cardiovascular: Irregular rate and rhythm ; HR well controlled   Pulmonary:  LSC throughout  GI:  +BS in all four quadrants, soft, no tenderness including to epigastric region  Extremities:  No edema; 2+ dorsalis pedis pulses bilaterally  Neuro: alert and oriented x 4       * Discharge Condition: improved  * Disposition: Home    Discharge Medications:  Current Discharge Medication List      CONTINUE these medications which have NOT CHANGED    Details   apixaban (ELIQUIS) 5 mg tablet Take 1 Tab by mouth two (2) times a day. Qty: 60 Tab, Refills: 2      aspirin 81 mg chewable tablet Take 1 Tab by mouth daily. Qty: 30 Tab, Refills: 2      atorvastatin (LIPITOR) 20 mg tablet Take 1 Tab by mouth nightly. Qty: 30 Tab, Refills: 2      carvediloL (COREG) 3.125 mg tablet Take 1 Tab by mouth every twelve (12) hours. Qty: 60 Tab, Refills: 2      pantoprazole (PROTONIX) 40 mg tablet Take 1 Tab by mouth daily. Qty: 30 Tab, Refills: 1             * Follow-up Care/Patient Instructions: Activity: Activity as tolerated  Diet: Cardiac Diet  Wound Care: None needed      Follow-up Appointments   Procedures    FOLLOW UP VISIT Appointment in: One Week pcp in one week Cardiology Dr Rhea Bunn group in 1-2 weeks     pcp in one week   Cardiology Dr Rhea Bunn group in 1-2 weeks     Standing Status:   Standing     Number of Occurrences:   1     Order Specific Question:   Appointment in     Answer:    One Week       Signed:  Ronen Padgett  12/3/2020  12:05 PM

## 2020-12-03 NOTE — ROUTINE PROCESS
Bedside and Verbal shift change report given to Lisa Farley (oncoming nurse) by KUSH Lopes (offgoing nurse). Report included the following information SBAR, Kardex, ED Summary, OR Summary, Intake/Output, MAR, Recent Results and Cardiac Rhythm A-fib.

## 2020-12-03 NOTE — PROGRESS NOTES
1250 Reviewed discharge instructions with pt. All questions/concerns addressed. Pt states he will find a PCP once insurance cards are received.

## 2020-12-03 NOTE — DISCHARGE INSTRUCTIONS

## 2020-12-17 ENCOUNTER — OFFICE VISIT (OUTPATIENT)
Dept: CARDIOLOGY CLINIC | Age: 70
End: 2020-12-17
Payer: MEDICARE

## 2020-12-17 VITALS
WEIGHT: 230 LBS | OXYGEN SATURATION: 97 % | BODY MASS INDEX: 32.93 KG/M2 | DIASTOLIC BLOOD PRESSURE: 80 MMHG | HEIGHT: 70 IN | SYSTOLIC BLOOD PRESSURE: 110 MMHG | HEART RATE: 131 BPM

## 2020-12-17 DIAGNOSIS — I50.21 ACUTE SYSTOLIC CONGESTIVE HEART FAILURE (HCC): Primary | ICD-10-CM

## 2020-12-17 DIAGNOSIS — I48.19 PERSISTENT ATRIAL FIBRILLATION (HCC): ICD-10-CM

## 2020-12-17 DIAGNOSIS — C85.90 NON-HODGKIN'S LYMPHOMA, UNSPECIFIED BODY REGION, UNSPECIFIED NON-HODGKIN LYMPHOMA TYPE (HCC): ICD-10-CM

## 2020-12-17 DIAGNOSIS — F10.10 ALCOHOL ABUSE: ICD-10-CM

## 2020-12-17 DIAGNOSIS — I42.8 NONISCHEMIC CARDIOMYOPATHY (HCC): ICD-10-CM

## 2020-12-17 PROCEDURE — 1111F DSCHRG MED/CURRENT MED MERGE: CPT | Performed by: INTERNAL MEDICINE

## 2020-12-17 PROCEDURE — G8510 SCR DEP NEG, NO PLAN REQD: HCPCS | Performed by: INTERNAL MEDICINE

## 2020-12-17 PROCEDURE — 3017F COLORECTAL CA SCREEN DOC REV: CPT | Performed by: INTERNAL MEDICINE

## 2020-12-17 PROCEDURE — G8417 CALC BMI ABV UP PARAM F/U: HCPCS | Performed by: INTERNAL MEDICINE

## 2020-12-17 PROCEDURE — G8536 NO DOC ELDER MAL SCRN: HCPCS | Performed by: INTERNAL MEDICINE

## 2020-12-17 PROCEDURE — 1101F PT FALLS ASSESS-DOCD LE1/YR: CPT | Performed by: INTERNAL MEDICINE

## 2020-12-17 PROCEDURE — 93000 ELECTROCARDIOGRAM COMPLETE: CPT | Performed by: INTERNAL MEDICINE

## 2020-12-17 PROCEDURE — 99215 OFFICE O/P EST HI 40 MIN: CPT | Performed by: INTERNAL MEDICINE

## 2020-12-17 PROCEDURE — G8427 DOCREV CUR MEDS BY ELIG CLIN: HCPCS | Performed by: INTERNAL MEDICINE

## 2020-12-17 RX ORDER — DIGOXIN 125 MCG
0.12 TABLET ORAL DAILY
Qty: 30 TAB | Refills: 6 | Status: SHIPPED | OUTPATIENT
Start: 2020-12-17 | End: 2021-06-30 | Stop reason: SDUPTHER

## 2020-12-17 RX ORDER — CARVEDILOL 6.25 MG/1
6.25 TABLET ORAL EVERY 12 HOURS
Qty: 60 TAB | Refills: 6 | Status: SHIPPED | OUTPATIENT
Start: 2020-12-17 | End: 2021-06-30 | Stop reason: SDUPTHER

## 2020-12-17 NOTE — PROGRESS NOTES
Sepideh English presents today for   Chief Complaint   Patient presents with    New Patient     post hosp. follow up for afib and SOB       Sepideh English. preferred language for health care discussion is english/other. Is someone accompanying this pt? no    Is the patient using any DME equipment during [address]? no    Depression Screening:  3 most recent PHQ Screens 12/17/2020   Little interest or pleasure in doing things Not at all   Feeling down, depressed, irritable, or hopeless Not at all   Total Score PHQ 2 0       Learning Assessment:  Learning Assessment 12/17/2020   PRIMARY LEARNER Patient   HIGHEST LEVEL OF EDUCATION - PRIMARY LEARNER  GRADUATED HIGH SCHOOL OR GED   BARRIERS PRIMARY LEARNER NONE   PRIMARY LANGUAGE ENGLISH   LEARNER PREFERENCE PRIMARY DEMONSTRATION   ANSWERED BY Patient   RELATIONSHIP SELF       Abuse Screening:  Abuse Screening Questionnaire 12/17/2020   Do you ever feel afraid of your partner? N   Are you in a relationship with someone who physically or mentally threatens you? N   Is it safe for you to go home? Y       Fall Risk  Fall Risk Assessment, last 12 mths 12/17/2020   Able to walk? Yes   Fall in past 12 months? No       Pt currently taking Anticoagulant therapy? no    Coordination of Care:  1. Have you been to the ER, urgent care clinic since your last visit? Hospitalized since your last visit? yes    2. Have you seen or consulted any other health care providers outside of the [address] [place] [address] since your last visit? Include any pap smears or colon screening.  no

## 2020-12-17 NOTE — PROGRESS NOTES
HISTORY OF PRESENT ILLNESS  Seward Litten. is a 72 y.o. male. HPI    Patient presents for a post hospital follow-up office visit. He was initially hospitalized in 2020 with new onset atrial fibrillation and rapid ventricular response and was found to have a severe dilated nonischemic cardiomyopathy with ejection fraction of less than 15%. He did have some mild heart failure symptoms during that admission. An echocardiogram demonstrated a mildly dilated left ventricle, EF less than 15%, mild aortic regurgitation mild aortic root dilatation and biatrial enlargement. He also underwent a cardiac catheterization during that admission which showed single-vessel disease of his ramus intermedius branch with a dilated cardiomyopathy out of proportion to his degree of coronary disease, thus a diagnosis of a dilated nonischemic cardiomyopathy. The patient does have a history of non-Hodgkin's lymphoma receiving CHOP chemotherapy 6 to 7 years ago. He reports that he recently started drinking heavily in 2020 after his wife  after battling cancer. He was drinking almost 750 cc of vodka daily. Following hospital discharge in October he states he briefly quit drinking but unfortunately started back up and unfortunately was readmitted to the hospital at the beginning of 2020 for acute on chronic systolic heart failure and rapid atrial fibrillation. He was diuresed, his medications were adjusted and he was discharged home approximately 2 weeks ago. Since hospital discharge, he states his been free of alcohol. He continues to wear a LifeVest.  He does complain of shortness of breath with most activities, but at times he is able to walk around the block with his dog without any symptoms. He has not noted any leg swelling, weight gain, dizziness or syncope. He does get occasional orthopnea but no PND.   He notices his heart palpitations primarily when he is exerting himself, but not at rest.    Past Medical History:   Diagnosis Date    Atrial fibrillation ([facility] [address] 10/2020    Gastrointestinal disorder     H/O cardiac catheterization 10/2020    Ramus intermedius with 90% lesion, otherwise clean coronary arteries. LVEDP 18 mmHg    Hep C w/o coma, chronic (HCC) 8-10-13    Liver disease current    non-hodgkins lymphoma    NHL (nodular histiocytic lymphoma) (HCC)     Nonischemic cardiomyopathy ([facility] [address] 2020    EF less than 15%     Current Outpatient Medications   Medication Sig Dispense Refill    carvediloL (COREG) 6.25 mg tablet Take 1 Tab by mouth every twelve (12) hours. 60 Tab 6    digoxin (LANOXIN) 0.125 mg tablet Take 1 Tab by mouth daily. 30 Tab 6    apixaban (ELIQUIS) 5 mg tablet Take 1 Tab by mouth two (2) times a day. 60 Tab 2    pantoprazole (PROTONIX) 40 mg tablet Take 1 Tab by mouth daily. 30 Tab 1     Allergies   Allergen Reactions    Codeine Other (comments)     Pain in his abdominal area.  Acetaminophen Other (comments)     Patient states cannot have acetaminophen due to hep C    Lidocaine Palpitations    Lipitor [Atorvastatin] Other (comments)      Social History     Tobacco Use    Smoking status: Former Smoker     Packs/day: 2.00     Years: 40.00     Pack years: 80.00     Types: Cigarettes     Quit date: 10/9/2013     Years since quittin.1    Smokeless tobacco: Never Used   Substance Use Topics    Alcohol use: Yes     Frequency: 2-3 times a week    Drug use: No     Comment: drinks generally 2-3 drinks per week, however  drank 6 mixed drinks yesterday     Family History   Problem Relation Age of Onset    Cancer Mother 76        lung cancer    Stroke Mother 76    Diabetes Mother 67    Heart Attack Father 76        cause of death    Heart Attack Paternal Grandfather 72       Review of Systems   Constitutional: Negative for chills, fever and weight loss. HENT: Negative for nosebleeds. Eyes: Negative for blurred vision and double vision.    Respiratory: Positive for shortness of breath. Negative for cough and wheezing. Cardiovascular: Positive for orthopnea. Negative for chest pain, palpitations, claudication, leg swelling and PND. Gastrointestinal: Negative for abdominal pain, heartburn, nausea and vomiting. Genitourinary: Negative for dysuria and hematuria. Musculoskeletal: Negative for falls and myalgias. Skin: Negative for rash. Neurological: Negative for dizziness, focal weakness and headaches. Endo/Heme/Allergies: Does not bruise/bleed easily. Psychiatric/Behavioral: Negative for substance abuse. Visit Vitals  /80 (BP 1 Location: Left arm, BP Patient Position: Sitting)   Pulse (!) 131   Ht 5' 10\" (1.778 m)   Wt 104.3 kg (230 lb)   SpO2 97%   BMI 33.00 kg/m²       Physical Exam   Constitutional: He is oriented to person, place, and time. He appears well-developed and well-nourished. HENT:   Head: Normocephalic and atraumatic. Eyes: Conjunctivae are normal.   Neck: Neck supple. No JVD present. Carotid bruit is not present. Cardiovascular: S1 normal, S2 normal and normal pulses. An irregularly irregular rhythm present. Tachycardia present. Exam reveals no gallop and no S3. No murmur heard. Pulmonary/Chest: Breath sounds normal. He has no wheezes. He has no rales. Abdominal: Soft. Bowel sounds are normal. There is no abdominal tenderness. Musculoskeletal:         General: No tenderness, deformity or edema. Neurological: He is alert and oriented to person, place, and time. Skin: Skin is warm and dry. Psychiatric: He has a normal mood and affect. His behavior is normal. Thought content normal.     EKG: Atrial fibrillation, rapid ventricular response, left axis deviation, poor R wave progression, nonspecific IVCD, nonspecific T wave abnormality. No change compared to the previous EKG from earlier this month. ASSESSMENT and PLAN  Encounter Diagnoses   Name Primary?     Chronic systolic heart failure Yes    Nonischemic cardiomyopathy (HCC)     Persistent atrial fibrillation (HCC)     Non-Hodgkin's lymphoma, unspecified body region, unspecified non-Hodgkin lymphoma type ([facility] [address]     Alcohol abuse      Chronic systolic heart failure. Patient appears euvolemic on exam today. He does not require any diuretic therapies at this point. He has NYHA class III symptomatology. I have recommended increasing his carvedilol first to improve rate control. Once his heart rates are improved, I would like to try and add Entresto and spironolactone to his regimen. Nonischemic dilated cardiomyopathy. Initially diagnosed in October 2020. EF less than 15% at that time by echocardiogram.  Repeat limited echocardiogram during his hospitalization 2 weeks ago showed his ejection fraction has not changed. This may be due to multiple toxins such as history of Adriamycin based chemotherapy years ago and also recent heavy alcohol abuse which he has recently quit. This may also be from prolonged tachycardia. He is wearing a LifeVest.  Once his heart rate is well controlled and his medications have been optimized, I would like to repeat an echocardiogram in 3 to 6 months to reevaluate his LV function. He may ultimately require an AICD for primary prevention. Atrial fibrillation. This is now likely persistent over the past few months. He is anticoagulated with Eliquis. His rates are suboptimally controlled, so I recommended increasing his carvedilol to 6.25 mg twice daily and adding digoxin to his regimen. History of heavy alcohol abuse. This occurred just recently over the past 4+ months since his wife passed away. He states in the past he was more of a binge drinker on the weekends. He has now abstained from alcohol since hospital discharge 2 weeks ago. Given his severely depressed LV function, I recommend he abstain from alcohol indefinitely.     Follow-up in 1 month, sooner if needed

## 2020-12-17 NOTE — PATIENT INSTRUCTIONS
Increase coreg 6.25 mg one tablet twice a day   Start Digoxin 0.125 mg one tablet daily   Stop aspirin  Stop Lipitor              Fluid Restriction: Care Instructions  Your Care Instructions     A buildup of fluid in the body can cause low sodium levels in the blood. It may also cause symptoms such as swelling and pain. Your doctor may suggest that you limit liquids, including foods that contain a lot of liquid. Limiting liquids is called fluid restriction. Keeping track of the amount of fluids you take in may help you feel better. Your doctor will tell you how much fluid you can have in a day. Follow-up care is a key part of your treatment and safety. Be sure to make and go to all appointments, and call your doctor if you are having problems. It's also a good idea to know your test results and keep a list of the medicines you take. How can you care for yourself at home? · Find a way of tracking the fluids you take in that works for you. Here are two methods you can try:  ? Write down how much you drink throughout the day. ? Keep a container filled with the amount of liquid allowed for the day. As you drink liquids during the day, such as a 6-ounce cup of coffee, pour that same amount out of the container. When the container is empty, you've had your liquid for the day. · Count any foods that will melt (such as ice cream, gelatin, or flavored ice treats) or liquid foods (such as soup) as part of your fluids for the day. Also count the liquid in canned fruits and vegetables as part of your daily intake, or drain them well before serving. · Space your liquids throughout the day. Then you won't be tempted to drink more than the amount your doctor recommends. · To relieve thirst without taking in extra water, try chewing gum, sucking on hard candy (sugarless if you have diabetes), or rinsing your mouth with water and spitting it out. Where can you learn more?   Go to http://www.gray.com/  Enter C327 in the search box to learn more about \"Fluid Restriction: Care Instructions. \"  Current as of: December 16, 2019               Content Version: 12.6  © 7233-5360 [employer]. Care instructions adapted under license by [employer] (which disclaims liability or warranty for this information). If you have questions about a medical condition or this instruction, always ask your healthcare professional. [address] any warranty or liability for your use of this information. Heart Failure: Care Instructions  Your Care Instructions     Heart failure occurs when your heart does not pump as much blood as the body needs. Failure does not mean that the heart has stopped pumping but rather that it is not pumping as well as it should. Over time, this causes fluid buildup in your lungs and other parts of your body. Fluid buildup can cause shortness of breath, fatigue, swollen ankles, and other problems. By taking medicines regularly, reducing sodium (salt) in your diet, checking your weight every day, and making lifestyle changes, you can feel better and live longer. Follow-up care is a key part of your treatment and safety. Be sure to make and go to all appointments, and call your doctor if you are having problems. It's also a good idea to know your test results and keep a list of the medicines you take. How can you care for yourself at home? Medicines    · Be safe with medicines. Take your medicines exactly as prescribed.  Call your doctor if you think you are having a problem with your medicine.     · Do not take any vitamins, over-the-counter medicine, or herbal products without talking to your doctor first. Ancil Peeks not take ibuprofen (Advil or Motrin) and naproxen (Aleve) without talking to your doctor first. They could make your heart failure worse.     · You may take some of the following medicine. ? Angiotensin-converting enzyme inhibitors (ACEIs) or angiotensin II receptor blockers (ARBs) reduce the heart's workload, lower blood pressure, and reduce swelling. Taking an ACEI or ARB may lower your chance of needing to be hospitalized. ? Beta-blockers can slow heart rate, decrease blood pressure, and improve your condition. Taking a beta-blocker may lower your chance of needing to be hospitalized. ? Diuretics, also called water pills, reduce swelling. You will get more details on the specific medicines your doctor prescribes. Diet    · Your doctor may suggest that you limit sodium. Your doctor can tell you how much sodium is right for you. An example is less than 3,000 mg a day. This includes all the salt you eat in cooking or in packaged foods. People get most of their sodium from processed foods. Fast food and restaurant meals also tend to be very high in sodium.     · Ask your doctor how much liquid you can drink each day. You may have to limit liquids. Weight    · Weigh yourself without clothing at the same time each day. Record your weight. Call your doctor if you have a sudden weight gain, such as more than 2 to 3 pounds in a day or 5 pounds in a week. (Your doctor may suggest a different range of weight gain.) A sudden weight gain may mean that your heart failure is getting worse. Activity level    · Start light exercise (if your doctor says it is okay). Even if you can only do a small amount, exercise will help you get stronger, have more energy, and manage your weight and your stress. Walking is an easy way to get exercise. Start out by walking a little more than you did before. Bit by bit, increase the amount you walk.     · When you exercise, watch for signs that your heart is working too hard. You are pushing yourself too hard if you cannot talk while you are exercising.  If you become short of breath or dizzy or have chest pain, stop, sit down, and rest.     · If you feel \"wiped out\" the day after you exercise, walk slower or for a shorter distance until you can work up to a better pace.     · Get enough rest at night. Sleeping with 1 or 2 pillows under your upper body and head may help you breathe easier. Lifestyle changes    · Do not smoke. Smoking can make a heart condition worse. If you need help quitting, talk to your doctor about stop-smoking programs and medicines. These can increase your chances of quitting for good. Quitting smoking may be the most important step you can take to protect your heart.     · Limit alcohol to 2 drinks a day for men and 1 drink a day for women. Too much alcohol can cause health problems.     · Avoid getting sick from colds and the flu. Get a pneumococcal vaccine shot. If you have had one before, ask your doctor whether you need another dose. Get a flu shot each year. If you must be around people with colds or the flu, wash your hands often. When should you call for help? Call 911 if you have symptoms of sudden heart failure such as:    · You have severe trouble breathing.     · You cough up pink, foamy mucus.     · You have a new irregular or rapid heartbeat. Call your doctor now or seek immediate medical care if:    · You have new or increased shortness of breath.     · You are dizzy or lightheaded, or you feel like you may faint.     · You have sudden weight gain, such as more than 2 to 3 pounds in a day or 5 pounds in a week. (Your doctor may suggest a different range of weight gain.)     · You have increased swelling in your legs, ankles, or feet.     · You are suddenly so tired or weak that you cannot do your usual activities. Watch closely for changes in your health, and be sure to contact your doctor if you develop new symptoms. Where can you learn more? Go to http://www.gray.com/  Enter R049 in the search box to learn more about \"Heart Failure: Care Instructions. \"  Current as of: December 16, 6192               JSATPIJ Version: 12.6  © 5937-0397 [employer]. Care instructions adapted under license by [employer] (which disclaims liability or warranty for this information). If you have questions about a medical condition or this instruction, always ask your healthcare professional. [address] any warranty or liability for your use of this information. Learning About Heart Failure Zones  What are heart failure zones? Heart failure zones give you an easy way to see changes in your heart failure symptoms. They also tell you when you need to get help. Check every day to see which zone you are in. Green zone. You are doing well. This is where you want to be. · Your weight is stable. It's not going up or down. · You breathe easily. · You are sleeping well. You are able to lie flat without shortness of breath. · You can do your usual activities. Yellow zone. Be careful. Your symptoms are changing. Call your doctor. · You have new or increased shortness of breath. · You are dizzy or lightheaded, or you feel like you may faint. · You have sudden weight gain, such as more than 2 to 3 pounds in a day or 5 pounds in a week. (Your doctor may suggest a different range of weight gain.)  · You have increased swelling in your legs, ankles, or feet. · You are so tired or weak that you can't do your usual activities. · You are not sleeping well. Shortness of breath wakes you up at night. You need extra pillows. Red zone. This is an emergency. Call 911. You have symptoms of sudden heart failure. For example:  · You have severe trouble breathing. · You cough up pink, foamy mucus. · You have a new irregular or fast heartbeat. You have symptoms of a heart attack. These may include:  · Chest pain or pressure, or a strange feeling in the chest.  · Sweating. · Shortness of breath. · Nausea or vomiting.   · Pain, pressure, or a strange feeling in the back, neck, jaw, or upper belly or in one or both shoulders or arms. · Lightheadedness or sudden weakness. · A fast or irregular heartbeat. If you have symptoms of a heart attack: After you call 911, the  may tell you to chew 1 adult-strength or 2 to 4 low-dose aspirin. Wait for an ambulance. Do not try to drive yourself. Follow-up care is a key part of your treatment and safety. Be sure to make and go to all appointments, and call your doctor if you are having problems. It's also a good idea to know your test results and keep a list of the medicines you take. Where can you learn more? Go to http://www.gray.com/  Enter T174 in the search box to learn more about \"Learning About Heart Failure Zones. \"  Current as of: December 16, 2019               Content Version: 12.6  © 7942-8097 [employer]. Care instructions adapted under license by [employer] (which disclaims liability or warranty for this information). If you have questions about a medical condition or this instruction, always ask your healthcare professional. [place] [address] any warranty or liability for your use of this information. Limiting Sodium With Heart Failure: Care Instructions  Your Care Instructions     Sodium causes your body to hold on to extra water. This may cause your heart failure symptoms to get worse. Limiting sodium may help you feel better. People get most of their sodium from processed foods. Fast food and restaurant meals also tend to be very high in sodium. Your doctor may suggest that you limit sodium. Your doctor can tell you how much sodium is right for you. An example is less than 3,000 mg a day. This includes all the salt you eat in cooked or packaged foods. Follow-up care is a key part of your treatment and safety. Be sure to make and go to all appointments, and call your doctor if you are having problems.  It's also a good idea to know your test results and keep a list of the medicines you take. How can you care for yourself at home? Read food labels  · Read food labels on cans and food packages. The labels tell you how much sodium is in each serving. Make sure that you look at the serving size. If you eat more than the serving size, you have eaten more sodium than is listed for one serving. · Food labels also tell you the Percent Daily Value for sodium. Choose products with low Percent Daily Values for sodium. · Be aware that sodium can come in forms other than salt, including monosodium glutamate (MSG), sodium citrate, and sodium bicarbonate (baking soda). MSG is often added to Asian food. You can sometimes ask for food without MSG or salt. Buy low-sodium foods  · Buy foods that are labeled \"unsalted\" (no salt added), \"sodium-free\" (less than 5 mg of sodium per serving), or \"low-sodium\" (less than 140 mg of sodium per serving). A food labeled \"light sodium\" has less than half of the full-sodium version of that food. Foods labeled \"reduced-sodium\" may still have too much sodium. · Buy fresh vegetables or plain, frozen vegetables. Buy low-sodium versions of canned vegetables, soups, and other canned goods. Prepare low-sodium meals  · Use less salt each day when cooking. Reducing salt in this way will help you adjust to the taste. Do not add salt after cooking. Take the salt shaker off the table. · Flavor your food with garlic, lemon juice, onion, vinegar, herbs, and spices instead of salt. Do not use soy sauce, steak sauce, onion salt, garlic salt, mustard, or ketchup on your food. · Make your own salad dressings, sauces, and ketchup without adding salt. · Use less salt (or none) when recipes call for it. You can often use half the salt a recipe calls for without losing flavor. Other dishes like rice, pasta, and grains do not need added salt. · Rinse canned vegetables. This removes some--but not all--of the salt.   · Avoid water that has a naturally high sodium content or that has been treated with water softeners, which add sodium. Call your local water company to find out the sodium content of your water supply. If you buy bottled water, read the label and choose a sodium-free brand. Avoid high-sodium foods, such as:  · Smoked, cured, salted, and canned meat, fish, and poultry. · Ham, bertrand, hot dogs, and luncheon meats. · Regular, hard, and processed cheese and regular peanut butter. · Crackers with salted tops. · Frozen prepared meals. · Canned and dried soups, broths, and bouillon, unless labeled sodium-free or low-sodium. · Canned vegetables, unless labeled sodium-free or low-sodium. · Salted snack foods such as chips and pretzels. · [place] [employer] fries, pizza, tacos, and other fast foods. · Pickles, olives, ketchup, and other condiments, especially soy sauce, unless labeled sodium-free or low-sodium. If you cannot cook for yourself  · Have family members or friends help you, or have someone cook low-sodium meals. · Check with your local senior nutrition program to find out where meals are served and whether they offer a low-sodium option. You can often find these programs through your local health department or hospital.  · Have meals delivered to your home. Most [facility] have a Meals on [employer]. These programs provide one hot meal a day for older adults, delivered to their homes. Ask whether these meals are low-sodium. Let them know that you are on a low-sodium diet. Where can you learn more? Go to http://www.gray.com/  Enter A166 in the search box to learn more about \"Limiting Sodium With Heart Failure: Care Instructions. \"  Current as of: December 16, 2019               Content Version: 12.6  © 7944-2353 [employer]. Care instructions adapted under license by [employer] (which disclaims liability or warranty for this information).  If you have questions about a medical condition or this instruction, always ask your healthcare professional. [address] any warranty or liability for your use of this information. Low Sodium Diet (2,000 Milligram): Care Instructions  Your Care Instructions     Too much sodium causes your body to hold on to extra water. This can raise your blood pressure and force your heart and kidneys to work harder. In very serious cases, this could cause you to be put in the hospital. It might even be life-threatening. By limiting sodium, you will feel better and lower your risk of serious problems. The most common source of sodium is salt. People get most of the salt in their diet from canned, prepared, and packaged foods. Fast food and restaurant meals also are very high in sodium. Your doctor will probably limit your sodium to less than 2,000 milligrams (mg) a day. This limit counts all the sodium in prepared and packaged foods and any salt you add to your food. Follow-up care is a key part of your treatment and safety. Be sure to make and go to all appointments, and call your doctor if you are having problems. It's also a good idea to know your test results and keep a list of the medicines you take. How can you care for yourself at home? Read food labels  · Read labels on cans and food packages. The labels tell you how much sodium is in each serving. Make sure that you look at the serving size. If you eat more than the serving size, you have eaten more sodium. · Food labels also tell you the Percent Daily Value for sodium. Choose products with low Percent Daily Values for sodium. · Be aware that sodium can come in forms other than salt, including monosodium glutamate (MSG), sodium citrate, and sodium bicarbonate (baking soda). MSG is often added to Asian food. When you eat out, you can sometimes ask for food without MSG or added salt.   Buy low-sodium foods  · Buy foods that are labeled \"unsalted\" (no salt added), \"sodium-free\" (less than 5 mg of sodium per serving), or \"low-sodium\" (less than 140 mg of sodium per serving). Foods labeled \"reduced-sodium\" and \"light sodium\" may still have too much sodium. Be sure to read the label to see how much sodium you are getting. · Buy fresh vegetables, or frozen vegetables without added sauces. Buy low-sodium versions of canned vegetables, soups, and other canned goods. Prepare low-sodium meals  · Cut back on the amount of salt you use in cooking. This will help you adjust to the taste. Do not add salt after cooking. One teaspoon of salt has about 2,300 mg of sodium. · Take the salt shaker off the table. · Flavor your food with garlic, lemon juice, onion, vinegar, herbs, and spices. Do not use soy sauce, lite soy sauce, steak sauce, onion salt, garlic salt, celery salt, mustard, or ketchup on your food. · Use low-sodium salad dressings, sauces, and ketchup. Or make your own salad dressings and sauces without adding salt. · Use less salt (or none) when recipes call for it. You can often use half the salt a recipe calls for without losing flavor. Other foods such as rice, pasta, and grains do not need added salt. · Rinse canned vegetables, and cook them in fresh water. This removes some--but not all--of the salt. · Avoid water that is naturally high in sodium or that has been treated with water softeners, which add sodium. Call your local water company to find out the sodium content of your water supply. If you buy bottled water, read the label and choose a sodium-free brand. Avoid high-sodium foods  · Avoid eating:  ? Smoked, cured, salted, and canned meat, fish, and poultry. ? Ham, bertrand, hot dogs, and luncheon meats. ? Regular, hard, and processed cheese and regular peanut butter. ? Crackers with salted tops, and other salted snack foods such as pretzels, chips, and salted popcorn. ? Frozen prepared meals, unless labeled low-sodium. ?  Canned and dried soups, broths, and bouillon, unless labeled sodium-free or low-sodium. ? Canned vegetables, unless labeled sodium-free or low-sodium. ? Western Kate fries, pizza, tacos, and other fast foods. ? Pickles, olives, ketchup, and other condiments, especially soy sauce, unless labeled sodium-free or low-sodium. Where can you learn more? Go to http://www.gray.com/  Enter V843 in the search box to learn more about \"Low Sodium Diet (2,000 Milligram): Care Instructions. \"  Current as of: August 22, 2019               Content Version: 12.6  © 9278-5713 [employer]. Care instructions adapted under license by [employer] (which disclaims liability or warranty for this information). If you have questions about a medical condition or this instruction, always ask your healthcare professional. [address] any warranty or liability for your use of this information.

## 2021-01-04 NOTE — TELEPHONE ENCOUNTER
Patient requested a 30 day prescription be sent to [place] today - he is completely out of the medication.

## 2021-01-19 ENCOUNTER — OFFICE VISIT (OUTPATIENT)
Dept: CARDIOLOGY CLINIC | Age: 71
End: 2021-01-19
Payer: MEDICARE

## 2021-01-19 ENCOUNTER — TELEPHONE (OUTPATIENT)
Dept: CARDIAC REHAB | Age: 71
End: 2021-01-19

## 2021-01-19 VITALS
BODY MASS INDEX: 31.92 KG/M2 | HEIGHT: 70 IN | SYSTOLIC BLOOD PRESSURE: 124 MMHG | DIASTOLIC BLOOD PRESSURE: 86 MMHG | OXYGEN SATURATION: 96 % | HEART RATE: 75 BPM | WEIGHT: 223 LBS

## 2021-01-19 DIAGNOSIS — I50.21 ACUTE SYSTOLIC CONGESTIVE HEART FAILURE (HCC): Primary | ICD-10-CM

## 2021-01-19 DIAGNOSIS — I50.22 CHRONIC SYSTOLIC HEART FAILURE (HCC): ICD-10-CM

## 2021-01-19 DIAGNOSIS — C85.90 NON-HODGKIN'S LYMPHOMA, UNSPECIFIED BODY REGION, UNSPECIFIED NON-HODGKIN LYMPHOMA TYPE (HCC): ICD-10-CM

## 2021-01-19 DIAGNOSIS — R06.09 DOE (DYSPNEA ON EXERTION): ICD-10-CM

## 2021-01-19 DIAGNOSIS — F10.11 ALCOHOL ABUSE, IN REMISSION: ICD-10-CM

## 2021-01-19 DIAGNOSIS — I51.9 LV DYSFUNCTION: ICD-10-CM

## 2021-01-19 DIAGNOSIS — I48.19 PERSISTENT ATRIAL FIBRILLATION (HCC): ICD-10-CM

## 2021-01-19 DIAGNOSIS — I42.8 NONISCHEMIC CARDIOMYOPATHY (HCC): ICD-10-CM

## 2021-01-19 PROCEDURE — 1101F PT FALLS ASSESS-DOCD LE1/YR: CPT | Performed by: INTERNAL MEDICINE

## 2021-01-19 PROCEDURE — G8510 SCR DEP NEG, NO PLAN REQD: HCPCS | Performed by: INTERNAL MEDICINE

## 2021-01-19 PROCEDURE — G8427 DOCREV CUR MEDS BY ELIG CLIN: HCPCS | Performed by: INTERNAL MEDICINE

## 2021-01-19 PROCEDURE — G8536 NO DOC ELDER MAL SCRN: HCPCS | Performed by: INTERNAL MEDICINE

## 2021-01-19 PROCEDURE — 3017F COLORECTAL CA SCREEN DOC REV: CPT | Performed by: INTERNAL MEDICINE

## 2021-01-19 PROCEDURE — G8417 CALC BMI ABV UP PARAM F/U: HCPCS | Performed by: INTERNAL MEDICINE

## 2021-01-19 PROCEDURE — 93000 ELECTROCARDIOGRAM COMPLETE: CPT | Performed by: INTERNAL MEDICINE

## 2021-01-19 PROCEDURE — 99214 OFFICE O/P EST MOD 30 MIN: CPT | Performed by: INTERNAL MEDICINE

## 2021-01-19 RX ORDER — SPIRONOLACTONE 25 MG/1
12.5 TABLET ORAL DAILY
Qty: 30 TAB | Refills: 5 | Status: SHIPPED | OUTPATIENT
Start: 2021-01-19 | End: 2021-11-01

## 2021-01-19 RX ORDER — SACUBITRIL AND VALSARTAN 24; 26 MG/1; MG/1
1 TABLET, FILM COATED ORAL 2 TIMES DAILY
Qty: 60 TAB | Refills: 4 | Status: SHIPPED | OUTPATIENT
Start: 2021-01-19 | End: 2022-06-27

## 2021-01-19 NOTE — PROGRESS NOTES
Monique Felton presents today for   Chief Complaint   Patient presents with    Follow-up     4-6 week follow up       Monique Felton. preferred language for health care discussion is english/other. Is someone accompanying this pt? no    Is the patient using any DME equipment during [address]? no    Depression Screening:  3 most recent PHQ Screens 1/19/2021   Little interest or pleasure in doing things Not at all   Feeling down, depressed, irritable, or hopeless Not at all   Total Score PHQ 2 0       Learning Assessment:  Learning Assessment 1/19/2021   PRIMARY LEARNER Patient   HIGHEST LEVEL OF EDUCATION - PRIMARY LEARNER  -   BARRIERS PRIMARY LEARNER -   PRIMARY LANGUAGE ENGLISH   LEARNER PREFERENCE PRIMARY DEMONSTRATION   ANSWERED BY patient   RELATIONSHIP SELF       Abuse Screening:  Abuse Screening Questionnaire 1/19/2021   Do you ever feel afraid of your partner? N   Are you in a relationship with someone who physically or mentally threatens you? N   Is it safe for you to go home? Y       Fall Risk  Fall Risk Assessment, last 12 mths 1/19/2021   Able to walk? Yes   Fall in past 12 months? 0   Do you feel unsteady? 0   Are you worried about falling 0       Pt currently taking Anticoagulant therapy? Eliquis 5 mg twice a day    Coordination of Care:  1. Have you been to the ER, urgent care clinic since your last visit? Hospitalized since your last visit? no    2. Have you seen or consulted any other health care providers outside of the [address] [place] [address] since your last visit? Include any pap smears or colon screening.  no

## 2021-01-19 NOTE — TELEPHONE ENCOUNTER
[facility] called patient and left a message about the program. Additional attempts at contact will be made.     Thank you,  Ralu Johnson

## 2021-01-19 NOTE — PROGRESS NOTES
HISTORY OF PRESENT ILLNESS  Juan M Dailey is a 72 y.o. male. Follow-up  Associated symptoms include shortness of breath. Pertinent negatives include no chest pain, no abdominal pain and no headaches. Patient presents for a follow-up office visit. He was initially hospitalized in 2020 with new onset atrial fibrillation and rapid ventricular response and was found to have a severe dilated nonischemic cardiomyopathy with ejection fraction of less than 15%. He did have some mild heart failure symptoms during that admission. An echocardiogram demonstrated a mildly dilated left ventricle, EF less than 15%, mild aortic regurgitation mild aortic root dilatation and biatrial enlargement. He also underwent a cardiac catheterization during that admission which showed single-vessel disease of his ramus intermedius branch with a dilated cardiomyopathy out of proportion to his degree of coronary disease, thus a diagnosis of a dilated nonischemic cardiomyopathy. The patient does have a history of non-Hodgkin's lymphoma receiving CHOP chemotherapy 6 to 7 years ago. He reports that he recently started drinking heavily in 2020 after his wife  after battling cancer. He was drinking almost 750 cc of vodka daily. Following hospital discharge in October he states he briefly quit drinking but unfortunately started back up and unfortunately was readmitted to the hospital at the beginning of 2020 for acute on chronic systolic heart failure and rapid atrial fibrillation. He was diuresed, his medications were adjusted and he was discharged home. Since hospital discharge, he states his been free of alcohol. He continues to wear a LifeVest.      He was last seen in the office approximately 1 month ago. Since last visit, he states he has been feeling better. He states his activity tolerance is better.   He states that every few weeks he does notice myalgias in his upper extremities and overall feeling weak which usually last about 7 days and then this resolves. He is taking all his medications as prescribed. He denies any worsening leg swelling, no orthopnea or PND. No heart palpitations, dizziness or syncope. Past Medical History:   Diagnosis Date    Atrial fibrillation ([facility] [address] 10/2020    Gastrointestinal disorder     H/O cardiac catheterization 10/2020    Ramus intermedius with 90% lesion, otherwise clean coronary arteries. LVEDP 18 mmHg    Hep C w/o coma, chronic (HCC) 8-10-13    Liver disease current    non-hodgkins lymphoma    NHL (nodular histiocytic lymphoma) (HCC)     Nonischemic cardiomyopathy ([facility] [address] 2020    EF less than 15%     Current Outpatient Medications   Medication Sig Dispense Refill    sacubitriL-valsartan (Entresto) 24-26 mg tablet Take 1 Tab by mouth two (2) times a day. 60 Tab 4    spironolactone (ALDACTONE) 25 mg tablet Take 0.5 Tabs by mouth daily. 30 Tab 5    apixaban (ELIQUIS) 5 mg tablet Take 1 Tab by mouth two (2) times a day. 60 Tab 2    carvediloL (COREG) 6.25 mg tablet Take 1 Tab by mouth every twelve (12) hours. 60 Tab 6    digoxin (LANOXIN) 0.125 mg tablet Take 1 Tab by mouth daily. 30 Tab 6     Allergies   Allergen Reactions    Codeine Other (comments)     Pain in his abdominal area.     Acetaminophen Other (comments)     Patient states cannot have acetaminophen due to hep C    Lidocaine Palpitations    Lipitor [Atorvastatin] Other (comments)      Social History     Tobacco Use    Smoking status: Former Smoker     Packs/day: 2.00     Years: 40.00     Pack years: 80.00     Types: Cigarettes     Quit date: 10/9/2013     Years since quittin.2    Smokeless tobacco: Never Used   Substance Use Topics    Alcohol use: Yes     Frequency: 2-3 times a week    Drug use: No     Comment: drinks generally 2-3 drinks per week, however  drank 6 mixed drinks yesterday     Family History   Problem Relation Age of Onset    Cancer Mother 76        lung cancer    Stroke Mother 76    Diabetes Mother 67    Heart Attack Father 76        cause of death    Heart Attack Paternal Grandfather 72       Review of Systems   Constitutional: Negative for chills, fever and weight loss. HENT: Negative for nosebleeds. Eyes: Negative for blurred vision and double vision. Respiratory: Positive for shortness of breath. Negative for cough and wheezing. Cardiovascular: Negative for chest pain, palpitations, orthopnea, claudication, leg swelling and PND. Gastrointestinal: Negative for abdominal pain, heartburn, nausea and vomiting. Genitourinary: Negative for dysuria and hematuria. Musculoskeletal: Negative for falls and myalgias. Skin: Negative for rash. Neurological: Negative for dizziness, focal weakness and headaches. Endo/Heme/Allergies: Does not bruise/bleed easily. Psychiatric/Behavioral: Negative for substance abuse. Visit Vitals  /86 (BP 1 Location: Left arm, BP Patient Position: Sitting)   Pulse 75   Ht 5' 10\" (1.778 m)   Wt 101.2 kg (223 lb)   SpO2 96%   BMI 32.00 kg/m²       Physical Exam   Constitutional: He is oriented to person, place, and time. He appears well-developed and well-nourished. HENT:   Head: Normocephalic and atraumatic. Eyes: Conjunctivae are normal.   Neck: Neck supple. No JVD present. Carotid bruit is not present. Cardiovascular: Normal rate, S1 normal, S2 normal and normal pulses. An irregularly irregular rhythm present. Exam reveals no gallop and no S3. No murmur heard. Pulmonary/Chest: Breath sounds normal. He has no wheezes. He has no rales. Abdominal: Soft. Bowel sounds are normal. There is no abdominal tenderness. Musculoskeletal:         General: No tenderness, deformity or edema. Neurological: He is alert and oriented to person, place, and time. Skin: Skin is warm and dry. Psychiatric: He has a normal mood and affect.  His behavior is normal. Thought content normal.     EKG: Atrial fibrillation, controlled ventricular rate in the 70s, left axis deviation, poor R wave progression, nonspecific IVCD, nonspecific T wave abnormality. Heart rate has decreased compared to the prior tracing. ASSESSMENT and PLAN    Chronic systolic heart failure. Patient remains euvolemic on exam today. He   not require any diuretic therapies at this point. He now has NYHA class II symptomatology. His heart rate is now much better on the increased dose of carvedilol. I have recommended adding low-dose Entresto and a low dosage of spironolactone to his regimen. Nonischemic dilated cardiomyopathy. Initially diagnosed in October 2020. EF less than 15% at that time by echocardiogram.  No severe coronary disease was identified on cardiac catheterization. He did have a ramus intermedius lesion which was out of proportion to the degree of his cardiomyopathy. Repeat limited echocardiogram during his hospitalization in December 2020 ejection fraction has not changed. This may be due to multiple toxins such as history of Adriamycin based chemotherapy years ago and also recent heavy alcohol abuse which he has recently quit. This may also be from prolonged tachycardia. He continues to wear a LifeVest.  His medications will be optimized as described above. I would then like to repeat a limited echocardiogram in 2 to 3 months to reevaluate his LV function. If his LV function remains 35% or less, would offer him an AICD for primary prevention. Of also recommended referral to cardiac rehabilitation. Atrial fibrillation. This is now likely persistent over the past few months. He is anticoagulated with Eliquis. His heart rates are now much better controlled with the increased dose of carvedilol and addition of digoxin. I will continue his current rate controlling regimen and anticoagulation. History of heavy alcohol abuse. This occurred just recently over the past 4+ months since his wife passed away. He states in the past he was more of a binge drinker on the weekends. He has now abstained from alcohol since hospital discharge 6 weeks ago. Given his severely depressed LV function, I recommend he abstain from alcohol indefinitely.     Follow-up in 3 months, sooner if needed

## 2021-01-19 NOTE — PATIENT INSTRUCTIONS
Referral to cardiac rehab  Start entresto 24/26mg twice a day  Start spironlactone 12.5mg once daily  Bmp in 2-3 weeks   Limited echo in 3 months  Follow up after echo

## 2021-01-20 ENCOUNTER — TELEPHONE (OUTPATIENT)
Dept: CARDIAC REHAB | Age: 71
End: 2021-01-20

## 2021-01-20 NOTE — TELEPHONE ENCOUNTER
Cardiac Rehab received a call from patient stating that he is interested. He wants to hold off until he meets his Medicare deductible and call us back in the next few days. Will follow up if we do not hear back.     Thank you,  Britany Downing

## 2021-01-29 ENCOUNTER — HOSPITAL ENCOUNTER (OUTPATIENT)
Dept: CARDIAC REHAB | Age: 71
Discharge: HOME | End: 2021-01-29
Payer: MEDICARE

## 2021-01-29 VITALS — BODY MASS INDEX: 31.85 KG/M2 | WEIGHT: 222 LBS

## 2021-01-29 PROCEDURE — 93798 PHYS/QHP OP CAR RHAB W/ECG: CPT

## 2021-01-29 NOTE — PROGRESS NOTES
CARDIAC REHAB INITIAL ITP FOR REVIEW AND SIGNATURE    Yousuf Holman. 72 y.o. presented to cardiac rehab for an intake and a six minute walk test today with a primary diagnosis of CHF. Patient's EF is 15%. Yousuf Holman has a history of CHF. Cardiac risk factors include smoking/ tobacco exposure, alcohol/drug abuse, hypertension and these were reviewed with patient. Yousuf Holman is not  and lives with sister. PHQ-9, depression score, is 1 and this is considered to be low. The result was discussed with patient who confirms score to be accurate and  a copy of patient's results were sent to patient's PCP. Patient denied chest pain or SOB during 6 minute walk and the cardiac rhythm was in Atrial Fibrillation. Daya Goodmankerrie Pratt will attend exercise and educational sessions 3 days a week in cardiac rehab for 36 sessions. Goals for Rehab:    Patient name: Daya Pacheco Sr. : 1950         Goals Comments   1. Increase EF to 50% by end of the program   [x] initial  [] met                  [] not met  [] progressing Current EF is 15%. Increase EF to 50% by next echo   2.  Lose 22lb   [x] initial  [] met                  [] not met  [] progressing Lose 4lb by next recert     Skye Weiss RN 2021 9:47 AM    Cardiac ITP     CR INTAKE from 2021 in [facility]      Treatment Diagnosis   Treatment Diagnosis 1 HF -- -- --   Referral Date 21 -- -- --   Significant Cardiovascular History Chronic atrial fibrillation, History of heart failure -- -- --   Individual Treatment Plan   ITP Visit Type Initial Assessment -- -- --   1st Date of Exercise  21 -- -- --   ITP Next Review Date 21 -- -- --   Visit #/Total Visits  -- -- --   EF % 15 % -- -- --   Risk Stratification High -- -- --   ITP Exercise, Psychosocial, Tobacco, Nutrition, Education -- -- --   Exercise    Stages of Change Preparation -- -- --   DASI Total Score 36.7 -- -- --   Total Score -- 1 1 0   Test Six minute walk test -- -- --   Exercise Prescription   Mode Treadmill, Bike, Stepper, Ergometer -- -- --   Frequency per week 2-3 -- -- --   Duration per session 35-55 -- -- --   Intensity  METS       1.9-3.0 -- -- --   RPE 11-13 -- -- --   Target Heart Rate  -- -- --   Resistance Training Yes -- -- --   Exercise Blood Pressures   Resting /77 -- -- --   Peak /80 -- -- --   Is BP WDL?  Yes -- -- --   Exercise Activity at Home   Type cathesthnics -- -- --   Frequency 5 days/week -- -- --   Duration 15-20 minutes -- -- --   Resistance Training Yes -- -- --   Exercise Education   Education Exercise safety, Signs/Symptoms to report, RPE scale, Equipment orientation -- -- --   Exercise Target Goal   Target Goal(s) Individual exercise RX, Aerobic activity 30 + minutes/day  5 days/week -- -- --   Psychosocial   [facility] Total Score 22 -- -- --   PHQ 9 Score 1 -- -- --   Psychosocial Intervention   Interventions No intervention indicated -- -- --   Currently Taking Psychotropic Meds No -- -- --   Medication Changes No -- -- --   Psychosocial Education   Education Impact self care behaviors on health -- -- --   Psychosocial Target Goals   Target Goal(s) Engages in self-care behaviors -- -- --   Uses Stress Mgmt Techniques Yes -- -- --   Nutrition   Stages of Change Preparation -- -- --   Diabetes No -- -- --   Lipids   Weight Management   Weight  100.7 kg (222 lb) -- -- --   Height  5' 10\" (1.778 m) -- -- --   BMI 31.92 -- -- --   Weight Goal 200lbs -- -- --   Waist Circumference  42\" -- -- --   Alcohol Daily -- -- --   Amount 1 ounce -- -- --   Rate Your Plate Total Score 56 -- -- --   Nutrition Intervention   Dietitian Consult No -- -- --   Nurse/Patient Discussion Yes -- -- --   Nutrition Class Yes -- -- --   Diabetes Education Referral No -- -- --   Lipid Clinic Referral No -- -- --   Weight Management Referral No -- -- --   Nutrition Education   Education Low fat & cholesterol diet, Carb-controlled diet, Low sodium diet, Healthy eating -- -- --   Nutrition Target Goals   Target Goals Weight loss of 5-10%, Waist size less than 40 inches males and less than 35 inches for females, BMI less than 25 -- -- --   Education   Learning Barrier Ready to learn -- -- --   Education Intervention   Education Schedule Given Yes -- -- --   Patient Education    Education CAD, Risk factors, Med Compliance, Cardiac A&P, Signs/Symptoms of Angina -- -- --   Hypertension Yes -- -- --   Hypertension Controlled Yes -- -- --   Is BP WDL?  Yes -- -- --   Med(s) Change No -- -- --   BP Meds Coreg, Entresto -- -- --   Education Target Goals   Target Goals Medication compliance, Risk factors, Understand target guidelines for lipids, Understand target guidelines for B/P -- -- --   Physician Response   Exercise     CR [facility]TA[facility]   Any problems changes since your last visit Other (comment)  [initial visit]   Any symptoms while exercising denies   Psychosocial/Stress Level 0   Resting EKG rhythm A fib   Tobacco Use None   Enter O2 Saturation and Liter Flow 97   ITP Next Review Date 02/28/21   Visit Number/Total Visits 1/36   What is plan for next session start exercise Rx   On Call Medical Director Immediately Available Royer   Target Heart Rate(Range)    Resting HR 79   Resting /77   Recovery HR 69   Recovery /79   Weight 100.7 kg (222 lb)   Exercise EKG Rhythm A fib   Exercise Duration 6   Peak HR 99   Peak /80   Peak RPE 0   Peak Mets 1.9   Asymptomatic yes   Total Minutes 6

## 2021-02-03 ENCOUNTER — HOSPITAL ENCOUNTER (OUTPATIENT)
Dept: CARDIAC REHAB | Age: 71
Discharge: HOME | End: 2021-02-03
Payer: MEDICARE

## 2021-02-03 VITALS — BODY MASS INDEX: 31.71 KG/M2 | WEIGHT: 221 LBS

## 2021-02-03 PROCEDURE — 93797 PHYS/QHP OP CAR RHAB WO ECG: CPT

## 2021-02-03 PROCEDURE — 93798 PHYS/QHP OP CAR RHAB W/ECG: CPT

## 2021-02-05 ENCOUNTER — HOSPITAL ENCOUNTER (OUTPATIENT)
Dept: CARDIAC REHAB | Age: 71
Discharge: HOME | End: 2021-02-05
Payer: MEDICARE

## 2021-02-05 VITALS — WEIGHT: 218 LBS | BODY MASS INDEX: 31.28 KG/M2

## 2021-02-05 PROCEDURE — 93798 PHYS/QHP OP CAR RHAB W/ECG: CPT

## 2021-02-08 ENCOUNTER — HOSPITAL ENCOUNTER (OUTPATIENT)
Dept: CARDIAC REHAB | Age: 71
Discharge: HOME | End: 2021-02-08
Payer: MEDICARE

## 2021-02-08 VITALS — WEIGHT: 221 LBS | BODY MASS INDEX: 31.71 KG/M2

## 2021-02-08 PROCEDURE — 93797 PHYS/QHP OP CAR RHAB WO ECG: CPT

## 2021-02-08 PROCEDURE — 93798 PHYS/QHP OP CAR RHAB W/ECG: CPT

## 2021-02-10 ENCOUNTER — HOSPITAL ENCOUNTER (OUTPATIENT)
Dept: CARDIAC REHAB | Age: 71
Discharge: HOME | End: 2021-02-10
Payer: MEDICARE

## 2021-02-10 VITALS — WEIGHT: 221 LBS | BODY MASS INDEX: 31.71 KG/M2

## 2021-02-10 PROCEDURE — 93798 PHYS/QHP OP CAR RHAB W/ECG: CPT

## 2021-02-12 ENCOUNTER — HOSPITAL ENCOUNTER (OUTPATIENT)
Dept: CARDIAC REHAB | Age: 71
Discharge: HOME | End: 2021-02-12
Payer: MEDICARE

## 2021-02-12 VITALS — WEIGHT: 223 LBS | BODY MASS INDEX: 32 KG/M2

## 2021-02-12 PROCEDURE — 93798 PHYS/QHP OP CAR RHAB W/ECG: CPT

## 2021-02-12 PROCEDURE — 93797 PHYS/QHP OP CAR RHAB WO ECG: CPT

## 2021-02-15 ENCOUNTER — HOSPITAL ENCOUNTER (OUTPATIENT)
Dept: CARDIAC REHAB | Age: 71
Discharge: HOME | End: 2021-02-15
Payer: MEDICARE

## 2021-02-15 VITALS — BODY MASS INDEX: 32 KG/M2 | WEIGHT: 223 LBS

## 2021-02-15 PROCEDURE — 93798 PHYS/QHP OP CAR RHAB W/ECG: CPT

## 2021-02-17 ENCOUNTER — HOSPITAL ENCOUNTER (OUTPATIENT)
Dept: CARDIAC REHAB | Age: 71
Discharge: HOME | End: 2021-02-17
Payer: MEDICARE

## 2021-02-17 VITALS — BODY MASS INDEX: 32 KG/M2 | WEIGHT: 223 LBS

## 2021-02-17 PROCEDURE — 93798 PHYS/QHP OP CAR RHAB W/ECG: CPT

## 2021-02-17 PROCEDURE — 93797 PHYS/QHP OP CAR RHAB WO ECG: CPT

## 2021-02-19 ENCOUNTER — HOSPITAL ENCOUNTER (OUTPATIENT)
Dept: CARDIAC REHAB | Age: 71
Discharge: HOME | End: 2021-02-19
Payer: MEDICARE

## 2021-02-19 VITALS — BODY MASS INDEX: 32 KG/M2 | WEIGHT: 223 LBS

## 2021-02-19 PROCEDURE — 93798 PHYS/QHP OP CAR RHAB W/ECG: CPT

## 2021-02-19 PROCEDURE — 93797 PHYS/QHP OP CAR RHAB WO ECG: CPT

## 2021-02-22 ENCOUNTER — HOSPITAL ENCOUNTER (OUTPATIENT)
Dept: CARDIAC REHAB | Age: 71
End: 2021-02-22
Payer: MEDICARE

## 2021-02-22 ENCOUNTER — HOSPITAL ENCOUNTER (OUTPATIENT)
Dept: CARDIAC REHAB | Age: 71
Discharge: HOME | End: 2021-02-22
Payer: MEDICARE

## 2021-02-22 VITALS — WEIGHT: 223 LBS | BODY MASS INDEX: 32 KG/M2

## 2021-02-22 PROCEDURE — 93798 PHYS/QHP OP CAR RHAB W/ECG: CPT

## 2021-02-22 PROCEDURE — 93797 PHYS/QHP OP CAR RHAB WO ECG: CPT

## 2021-02-24 ENCOUNTER — HOSPITAL ENCOUNTER (OUTPATIENT)
Dept: CARDIAC REHAB | Age: 71
Discharge: HOME | End: 2021-02-24
Payer: MEDICARE

## 2021-02-24 VITALS — BODY MASS INDEX: 31.85 KG/M2 | WEIGHT: 222 LBS

## 2021-02-24 PROCEDURE — 93798 PHYS/QHP OP CAR RHAB W/ECG: CPT

## 2021-02-25 NOTE — PROGRESS NOTES
CARDIAC REHAB ITP REASSESSMENT FOR REVIEW AND SIGNATURE  Patient name: Tianna Pedraza Sr. : 1950     Visits from Start of Care: 17      Reporting Period:  to     Subjective Reports: \"I see a big improvement in my endurance\"     Goals Comments   1. Increase EF to 50% by end of the program    [] met                  [] not met  [x] progressing Current EF is 15%. Increase EF to 50% by next echo    2. Lose 22lb [] met                  [] not met  [x] progressing Lose 4lb by next recert. Current weight is 222lb. Key functional changes: Increased peak METS on the treadmill from 1.9 to 2.9 and increased peak total METS to 8.9    Problems/ barriers to goal attainment: None      Assessment / Recommendations:Continue w/ rehab    Gloria Orosco RN 2021 10:11 AM    Cardiac ITP     CR PHASE II from 2021 in [place] [address]   Treatment Diagnosis 1 HF   Referral Date 21   Significant Cardiovascular History Chronic atrial fibrillation, History of heart failure   ITP Visit Type Re-Assessment   1st Date of Exercise  21   ITP Next Review Date 21   Visit #/Total Visits 17/36   EF % 15 %   Risk Stratification High   ITP Exercise, Psychosocial, Tobacco, Nutrition, Education   Stages of Change Action   Assisted Devices None   Mode Treadmill, Bike, Stepper, Ergometer   Frequency per week 2-3   Duration per session 35-55   Intensity  METS       2.0-9.0   RPE 11-13   Target Heart Rate    Resistance Training Yes   Resting /69   Peak /77   Is BP WDL?  Yes   Type cathesthnics   Frequency 5 days/week   Duration 15-20 minutes   Resistance Training Yes   Education Exercise safety, Signs/Symptoms to report, RPE scale, Equipment orientation   Target Goal(s) Individual exercise RX, Aerobic activity 30 + minutes/day  5 days/week   Stages of Change Action   Interventions No intervention indicated   Currently Taking Psychotropic Meds No   Medication Changes No   Education Impact self care behaviors on health   Target Goal(s) Engages in self-care behaviors   Uses Stress Mgmt Techniques Yes   Stages of Change Maintenance   Diabetes No   Weight  100.7 kg (222 lb)   Height  5' 10\" (1.778 m)   Weight Goal 200lbs   Waist Circumference  42\"   Alcohol Daily   Amount 1 ounce   Dietitian Consult No   Nurse/Patient Discussion Yes   Nutrition Class Yes   Diabetes Education Referral No   Lipid Clinic Referral No   Weight Management Referral No   Education Low fat & cholesterol diet, Carb-controlled diet, Low sodium diet, Healthy eating   Target Goals Weight loss of 5-10%, Waist size less than 40 inches males and less than 35 inches for females, BMI less than 25   Learning Barrier Ready to learn   Education Schedule Given Yes   Education CAD, Risk factors, Med Compliance, Cardiac A&P, Signs/Symptoms of Angina   Hypertension Yes   Hypertension Controlled Yes   Is BP WDL?  Yes   Med(s) Change No   BP Meds Coreg, Entresto   Target Goals Medication compliance, Risk factors, Understand target guidelines for lipids, Understand target guidelines for B/P   Exercise     CR PHASE II from 2/24/2021 in [facility]   Most recent reading at 2/25/2021 10:04 AM   Any problems changes since your last visit Denies   Any symptoms while exercising denies   Psychosocial/Stress Level 0   Resting EKG rhythm Afib   Tobacco Use None   ITP Next Review Date 03/25/21   Visit Number/Total Visits 17/36   On Call Medical Director Immediately Available Hwang   Target Heart Rate(Range)    Resting HR 92   Resting /69   Recovery HR 83   Recovery /77   Weight 100.7 kg (222 lb)   Exercise EKG Rhythm Afib   Exercise Duration 45   Peak    Peak RPE 16   Peak Mets 8.9   Asymptomatic yes   Total Minutes 55

## 2021-02-26 ENCOUNTER — HOSPITAL ENCOUNTER (OUTPATIENT)
Dept: CARDIAC REHAB | Age: 71
Discharge: HOME | End: 2021-02-26
Payer: MEDICARE

## 2021-02-26 VITALS — BODY MASS INDEX: 32 KG/M2 | WEIGHT: 223 LBS

## 2021-02-26 PROCEDURE — 93798 PHYS/QHP OP CAR RHAB W/ECG: CPT

## 2021-03-01 ENCOUNTER — HOSPITAL ENCOUNTER (OUTPATIENT)
Dept: CARDIAC REHAB | Age: 71
Discharge: HOME | End: 2021-03-01
Payer: MEDICARE

## 2021-03-01 VITALS — WEIGHT: 226 LBS | BODY MASS INDEX: 32.43 KG/M2

## 2021-03-01 PROCEDURE — 93798 PHYS/QHP OP CAR RHAB W/ECG: CPT

## 2021-03-03 ENCOUNTER — HOSPITAL ENCOUNTER (OUTPATIENT)
Dept: CARDIAC REHAB | Age: 71
Discharge: HOME | End: 2021-03-03
Payer: MEDICARE

## 2021-03-03 VITALS — BODY MASS INDEX: 32.43 KG/M2 | WEIGHT: 226 LBS

## 2021-03-03 PROCEDURE — 93798 PHYS/QHP OP CAR RHAB W/ECG: CPT

## 2021-03-05 ENCOUNTER — HOSPITAL ENCOUNTER (OUTPATIENT)
Dept: CARDIAC REHAB | Age: 71
Discharge: HOME | End: 2021-03-05
Payer: MEDICARE

## 2021-03-05 VITALS — WEIGHT: 226 LBS | BODY MASS INDEX: 32.43 KG/M2

## 2021-03-05 PROCEDURE — 93798 PHYS/QHP OP CAR RHAB W/ECG: CPT

## 2021-03-08 ENCOUNTER — HOSPITAL ENCOUNTER (OUTPATIENT)
Dept: CARDIAC REHAB | Age: 71
Discharge: HOME | End: 2021-03-08
Payer: MEDICARE

## 2021-03-08 VITALS — BODY MASS INDEX: 32 KG/M2 | WEIGHT: 223 LBS

## 2021-03-08 PROCEDURE — 93798 PHYS/QHP OP CAR RHAB W/ECG: CPT

## 2021-03-10 ENCOUNTER — HOSPITAL ENCOUNTER (OUTPATIENT)
Dept: CARDIAC REHAB | Age: 71
Discharge: HOME | End: 2021-03-10
Payer: MEDICARE

## 2021-03-10 VITALS — BODY MASS INDEX: 32.28 KG/M2 | WEIGHT: 225 LBS

## 2021-03-10 PROCEDURE — 93798 PHYS/QHP OP CAR RHAB W/ECG: CPT

## 2021-03-12 ENCOUNTER — HOSPITAL ENCOUNTER (OUTPATIENT)
Dept: CARDIAC REHAB | Age: 71
Discharge: HOME | End: 2021-03-12
Payer: MEDICARE

## 2021-03-12 VITALS — BODY MASS INDEX: 32 KG/M2 | WEIGHT: 223 LBS

## 2021-03-12 PROCEDURE — 93798 PHYS/QHP OP CAR RHAB W/ECG: CPT

## 2021-03-15 ENCOUNTER — APPOINTMENT (OUTPATIENT)
Dept: CARDIAC REHAB | Age: 71
End: 2021-03-15
Payer: MEDICARE

## 2021-03-17 ENCOUNTER — APPOINTMENT (OUTPATIENT)
Dept: CARDIAC REHAB | Age: 71
End: 2021-03-17
Payer: MEDICARE

## 2021-03-19 ENCOUNTER — HOSPITAL ENCOUNTER (OUTPATIENT)
Dept: CARDIAC REHAB | Age: 71
Discharge: HOME | End: 2021-03-19
Payer: MEDICARE

## 2021-03-19 VITALS — WEIGHT: 219 LBS | BODY MASS INDEX: 31.42 KG/M2

## 2021-03-19 PROCEDURE — 93798 PHYS/QHP OP CAR RHAB W/ECG: CPT

## 2021-03-22 ENCOUNTER — HOSPITAL ENCOUNTER (OUTPATIENT)
Dept: CARDIAC REHAB | Age: 71
Discharge: HOME | End: 2021-03-22
Payer: MEDICARE

## 2021-03-22 VITALS — WEIGHT: 224 LBS | BODY MASS INDEX: 32.14 KG/M2

## 2021-03-22 PROCEDURE — 93798 PHYS/QHP OP CAR RHAB W/ECG: CPT

## 2021-03-23 ENCOUNTER — HOSPITAL ENCOUNTER (OUTPATIENT)
Dept: CARDIAC REHAB | Age: 71
Discharge: HOME | End: 2021-03-23
Payer: MEDICARE

## 2021-03-23 VITALS — WEIGHT: 223 LBS | BODY MASS INDEX: 32 KG/M2

## 2021-03-23 PROCEDURE — 93798 PHYS/QHP OP CAR RHAB W/ECG: CPT

## 2021-03-24 ENCOUNTER — HOSPITAL ENCOUNTER (OUTPATIENT)
Dept: CARDIAC REHAB | Age: 71
Discharge: HOME | End: 2021-03-24
Payer: MEDICARE

## 2021-03-24 VITALS — WEIGHT: 222 LBS | BODY MASS INDEX: 31.85 KG/M2

## 2021-03-24 PROCEDURE — 93798 PHYS/QHP OP CAR RHAB W/ECG: CPT

## 2021-03-24 NOTE — PROGRESS NOTES
CARDIAC REHAB ITP REASSESSMENT FOR REVIEW AND SIGNATURE  Patient name: David Rogel Sr. : 1950      Visits from Start of Care: 28                                                  Reporting Period:  to 3/24     Subjective Reports: \"I see a big improvement in my endurance\"           Goals Comments   1. Increase EF to 50% by end of the program     []? met                      []? not met  [x]? progressing Current EF is 15%. Increase EF to 50% by next echo    2. Lose 22lb []? met                      []? not met  [x]? progressing Lose 4lb by next recert. Current weight is 222lb.      Key functional changes: Increased peak METS on the treadmill from 2.9 to 3.2 and increased peak total METS to 8.9     Problems/ barriers to goal attainment: None       Assessment / Recommendations:Continue w/ rehab     Racquel Holland RN 3/24/2021 1:38 PM    Cardiac ITP     CR PHASE II from 3/24/2021 in [place] [address]   Treatment Diagnosis 1 HF   Referral Date 21   Significant Cardiovascular History Chronic atrial fibrillation, History of heart failure   ITP Visit Type Re-Assessment   1st Date of Exercise  21   ITP Next Review Date 21   Visit #/Total Visits    EF % 15 %   Risk Stratification High   ITP Exercise, Psychosocial, Tobacco, Nutrition, Education   Stages of Change Action   Assisted Devices None   Mode Treadmill, Bike, Stepper, Ergometer   Frequency per week 2-3   Duration per session 35-55   Intensity  METS       2.0-9.0   RPE 11-13   Target Heart Rate    Resistance Training Yes   Resting /73   Peak /62   Is BP WDL?  Yes   Type cathesthnics   Frequency 5 days/week   Duration 15-20 minutes   Resistance Training Yes   Education Exercise safety, Signs/Symptoms to report, RPE scale, Equipment orientation   Target Goal(s) Individual exercise RX, Aerobic activity 30 + minutes/day  5 days/week   Stages of Change Action   Interventions No intervention indicated   Currently Taking Psychotropic Meds No   Medication Changes No   Education Impact self care behaviors on health   Target Goal(s) Engages in self-care behaviors   Uses Stress Mgmt Techniques Yes   Stages of Change Action   Diabetes No   Weight  100.7 kg (222 lb)   Height  5' 10\" (1.778 m)   BMI 31.92   Weight Goal 200lbs   Waist Circumference  42\"   Alcohol Daily   Amount 1 ounce   Dietitian Consult No   Nurse/Patient Discussion Yes   Nutrition Class Yes   Diabetes Education Referral No   Lipid Clinic Referral No   Weight Management Referral No   Education Low fat & cholesterol diet, Carb-controlled diet, Low sodium diet, Healthy eating   Target Goals Weight loss of 5-10%, Waist size less than 40 inches males and less than 35 inches for females, BMI less than 25   Learning Barrier Ready to learn   Education Schedule Given Yes   Education CAD, Risk factors, Med Compliance, Cardiac A&P, Signs/Symptoms of Angina   Hypertension Yes   Hypertension Controlled Yes   Is BP WDL?  Yes   Med(s) Change No   BP Meds Coreg, Entresto   Target Goals Medication compliance, Risk factors, Understand target guidelines for lipids, Understand target guidelines for B/P   Exercise     CR PHASE II from 3/24/2021 in [facility]   Any problems changes since your last visit Denies   Any symptoms while exercising denies   Psychosocial/Stress Level 0   Resting EKG rhythm Afib w/rare PVCs   Tobacco Use None   ITP Next Review Date 03/25/21   Visit Number/Total Visits 28/36   On Call Medical Director Immediately Available Harika Garcia   Target Heart Rate(Range)    Resting HR 68   Resting /73   Recovery HR 84   Recovery /62   Weight 100.7 kg (222 lb)   Exercise EKG Rhythm Afib   Exercise Duration 45   Peak    Peak RPE 16   Peak Mets 3.4   Asymptomatic yes   Total Minutes 55

## 2021-03-26 ENCOUNTER — HOSPITAL ENCOUNTER (OUTPATIENT)
Dept: CARDIAC REHAB | Age: 71
Discharge: HOME | End: 2021-03-26
Payer: MEDICARE

## 2021-03-26 VITALS — WEIGHT: 223 LBS | BODY MASS INDEX: 32 KG/M2

## 2021-03-26 PROCEDURE — 93798 PHYS/QHP OP CAR RHAB W/ECG: CPT

## 2021-03-29 ENCOUNTER — HOSPITAL ENCOUNTER (OUTPATIENT)
Dept: CARDIAC REHAB | Age: 71
Discharge: HOME | End: 2021-03-29
Payer: MEDICARE

## 2021-03-29 VITALS — WEIGHT: 225 LBS | BODY MASS INDEX: 32.28 KG/M2

## 2021-03-29 PROCEDURE — 93798 PHYS/QHP OP CAR RHAB W/ECG: CPT

## 2021-03-31 ENCOUNTER — HOSPITAL ENCOUNTER (OUTPATIENT)
Dept: CARDIAC REHAB | Age: 71
Discharge: HOME | End: 2021-03-31
Payer: MEDICARE

## 2021-03-31 VITALS — BODY MASS INDEX: 31.85 KG/M2 | WEIGHT: 222 LBS

## 2021-03-31 PROCEDURE — 93798 PHYS/QHP OP CAR RHAB W/ECG: CPT

## 2021-04-01 ENCOUNTER — HOSPITAL ENCOUNTER (OUTPATIENT)
Dept: CARDIAC REHAB | Age: 71
Discharge: HOME | End: 2021-04-01
Payer: MEDICARE

## 2021-04-01 VITALS — BODY MASS INDEX: 31.71 KG/M2 | WEIGHT: 221 LBS

## 2021-04-01 PROCEDURE — 93798 PHYS/QHP OP CAR RHAB W/ECG: CPT

## 2021-04-02 ENCOUNTER — HOSPITAL ENCOUNTER (OUTPATIENT)
Dept: CARDIAC REHAB | Age: 71
Discharge: HOME | End: 2021-04-02
Payer: MEDICARE

## 2021-04-02 VITALS — BODY MASS INDEX: 32 KG/M2 | WEIGHT: 223 LBS

## 2021-04-02 PROCEDURE — 93798 PHYS/QHP OP CAR RHAB W/ECG: CPT

## 2021-04-05 ENCOUNTER — TELEPHONE (OUTPATIENT)
Dept: CARDIOLOGY CLINIC | Age: 71
End: 2021-04-05

## 2021-04-07 ENCOUNTER — HOSPITAL ENCOUNTER (OUTPATIENT)
Dept: CARDIAC REHAB | Age: 71
Discharge: HOME | End: 2021-04-07
Payer: MEDICARE

## 2021-04-07 VITALS — BODY MASS INDEX: 31.71 KG/M2 | WEIGHT: 221 LBS

## 2021-04-07 PROCEDURE — 93798 PHYS/QHP OP CAR RHAB W/ECG: CPT

## 2021-04-08 ENCOUNTER — HOSPITAL ENCOUNTER (OUTPATIENT)
Dept: CARDIAC REHAB | Age: 71
Discharge: HOME | End: 2021-04-08
Payer: MEDICARE

## 2021-04-08 VITALS — WEIGHT: 223 LBS | BODY MASS INDEX: 32 KG/M2

## 2021-04-08 PROCEDURE — 93798 PHYS/QHP OP CAR RHAB W/ECG: CPT

## 2021-04-09 ENCOUNTER — HOSPITAL ENCOUNTER (OUTPATIENT)
Dept: CARDIAC REHAB | Age: 71
Discharge: HOME | End: 2021-04-09
Payer: MEDICARE

## 2021-04-09 VITALS — BODY MASS INDEX: 31.57 KG/M2 | WEIGHT: 220 LBS

## 2021-04-09 PROCEDURE — 93798 PHYS/QHP OP CAR RHAB W/ECG: CPT

## 2021-04-09 NOTE — PROGRESS NOTES
Cardiac ITP/Completed discharge for signature    Dulce Brunner. has completed phase II cardiac rehab and attended 36 of 36 sessions. Gay Carrington Sr. is interested in maintaining optimal health and will work with Dr. Chace Olivarez MD.  He has improved his endurance and stamina through regular exercise during the program. He lost 2 lbs and 2 inches from his waist. Blood pressure is 122/73 and is WNL. He has also improved his Rate Your Plate, [place], DASI, and PHQ-9 depression scores and these were reviewed with patient. His MET level increased on his six minute walk test from 1.9 to 3.3 and increased feet walked from 633.6 to 1584 ft. Dulce Brunner has met his recert goals and plans to continue exercising at home completing house chores and walking for 15-20 minutes 5 days per week. Goals Comments   1. Increase EF to 50% by end of the program     []? ? met                      []?? not met  [x]? ? progressing Most recent EF 15-20%. 2. Lose 22lb []?? met                      []?? not met  [x]? ? progressing  Current weight 220lbs. Pt has lost 2 lbs since start of program.         CR PHASE II from 4/9/2021 in [facility]   Treatment Diagnosis   Treatment Diagnosis 1 HF   Referral Date 01/19/21   Significant Cardiovascular History Chronic atrial fibrillation, History of heart failure   Individual Treatment Plan   ITP Visit Type Discharge, completed program   1st Date of Exercise  01/29/21   ITP Next Review Date 04/23/21   Visit #/Total Visits 36/36   EF % 15 %  [15-20% 4/6/2021]   Risk Stratification High   ITP Exercise, Psychosocial, Tobacco, Nutrition, Education   Exercise    Stages of Change Maintenance   DASI Total Score 50.7   Assisted Devices None   Test Six minute walk test   Exercise Prescription   Mode Treadmill, Bike, Stepper, Ergometer   Frequency per week 2-3   Duration per session 35-55   Intensity  METS       2.0-9.0   RPE 11-13   Target Heart Rate    Resistance Training Yes   Exercise Blood Pressures   Resting /73   Peak /74   Is BP WDL? Yes   Exercise Activity at Home   Type cathesthnics   Frequency 5 days/week   Duration 15-20 minutes   Resistance Training Yes   Exercise Education   Education Exercise safety, Signs/Symptoms to report, RPE scale, Equipment orientation   Exercise Target Goal   Target Goal(s) Individual exercise RX, Aerobic activity 30 + minutes/day  5 days/week   Psychosocial   Stages of Change Maintenance   [facility] Total Score 18   PHQ 9 Score 0   Psychosocial Intervention   Interventions No intervention indicated   Medication Changes No   Psychosocial Education   Education Impact self care behaviors on health   Psychosocial Target Goals   Target Goal(s) Engages in self-care behaviors   Uses Stress Mgmt Techniques Yes   Nutrition   Stages of Change Maintenance   Diabetes No   Lipids   Weight Management   Weight  99.8 kg (220 lb)   Height  5' 10\" (1.778 m)   BMI 31.63   Weight Goal 200lbs   Waist Circumference  40   Alcohol Daily   Amount 1 ounce   Rate Your Plate Total Score 58   Nutrition Intervention   Dietitian Consult No   Nurse/Patient Discussion Yes   Nutrition Class Yes   Diabetes Education Referral No   Lipid Clinic Referral No   Weight Management Referral No   Nutrition Education   Nutrition Target Goals   Target Goals Weight loss of 5-10%, Waist size less than 40 inches males and less than 35 inches for females, BMI less than 25   Education   Learning Barrier Ready to learn   Education Intervention   Education Schedule Given Yes   Patient Education    Education CAD, Risk factors, Med Compliance, Cardiac A&P, Signs/Symptoms of Angina   Hypertension Yes   Hypertension Controlled Yes   Is BP WDL?  Yes   Med(s) Change No   BP Meds Coreg, Entresto   Education Target Goals   Target Goals Medication compliance, Risk factors, Understand target guidelines for lipids, Understand target guidelines for B/P   Physician Response   Exercise     CR PHASE II from 4/9/2021 in [facility] Common Questions   Any problems changes since your last visit Denies   Any symptoms while exercising Denies   Psychosocial/Stress Level 0   Resting EKG rhythm Afib   Tobacco Use None   ITP Next Review Date 04/23/21   Visit Number/Total Visits 36/36   On Call Medical Director Immediately Available [facility]   Exercise Treatment Log   Target Heart Rate(Range)    Resting HR 80   Resting /73   Recovery HR 85   Recovery /81   Weight 99.8 kg (220 lb)   Exercise EKG Rhythm Afib w/ rare PVCs   Exercise Duration 46   Peak    Peak RPE 16   Peak Mets 16.8   Asymptomatic yes   Total Minutes 60             Janice Alvarez  4/9/2021

## 2021-04-13 ENCOUNTER — OFFICE VISIT (OUTPATIENT)
Dept: CARDIOLOGY CLINIC | Age: 71
End: 2021-04-13
Payer: MEDICARE

## 2021-04-13 VITALS
HEART RATE: 72 BPM | DIASTOLIC BLOOD PRESSURE: 82 MMHG | WEIGHT: 221 LBS | HEIGHT: 70 IN | BODY MASS INDEX: 31.64 KG/M2 | OXYGEN SATURATION: 98 % | SYSTOLIC BLOOD PRESSURE: 136 MMHG

## 2021-04-13 DIAGNOSIS — I42.8 NONISCHEMIC CARDIOMYOPATHY (HCC): ICD-10-CM

## 2021-04-13 DIAGNOSIS — I50.22 CHRONIC SYSTOLIC CONGESTIVE HEART FAILURE (HCC): Primary | ICD-10-CM

## 2021-04-13 DIAGNOSIS — I45.4 IVCD (INTRAVENTRICULAR CONDUCTION DEFECT): ICD-10-CM

## 2021-04-13 DIAGNOSIS — I48.19 PERSISTENT ATRIAL FIBRILLATION (HCC): ICD-10-CM

## 2021-04-13 PROCEDURE — G8417 CALC BMI ABV UP PARAM F/U: HCPCS | Performed by: INTERNAL MEDICINE

## 2021-04-13 PROCEDURE — 99214 OFFICE O/P EST MOD 30 MIN: CPT | Performed by: INTERNAL MEDICINE

## 2021-04-13 PROCEDURE — G8510 SCR DEP NEG, NO PLAN REQD: HCPCS | Performed by: INTERNAL MEDICINE

## 2021-04-13 PROCEDURE — 93000 ELECTROCARDIOGRAM COMPLETE: CPT | Performed by: INTERNAL MEDICINE

## 2021-04-13 PROCEDURE — 3017F COLORECTAL CA SCREEN DOC REV: CPT | Performed by: INTERNAL MEDICINE

## 2021-04-13 PROCEDURE — G8536 NO DOC ELDER MAL SCRN: HCPCS | Performed by: INTERNAL MEDICINE

## 2021-04-13 PROCEDURE — G8427 DOCREV CUR MEDS BY ELIG CLIN: HCPCS | Performed by: INTERNAL MEDICINE

## 2021-04-13 PROCEDURE — 1101F PT FALLS ASSESS-DOCD LE1/YR: CPT | Performed by: INTERNAL MEDICINE

## 2021-04-13 NOTE — PROGRESS NOTES
Karyna Poon presents today for   Chief Complaint   Patient presents with    Follow-up     follow up after debi Gooden Sr. preferred language for health care discussion is english/other. Is someone accompanying this pt? no    Is the patient using any DME equipment during [address]? no    Depression Screening:  3 most recent PHQ Screens 4/13/2021   Little interest or pleasure in doing things Not at all   Feeling down, depressed, irritable, or hopeless Not at all   Total Score PHQ 2 0   Trouble falling or staying asleep, or sleeping too much -   Feeling tired or having little energy -   Poor appetite, weight loss, or overeating -   Feeling bad about yourself - or that you are a failure or have let yourself or your family down -   Trouble concentrating on things such as school, work, reading, or watching TV -   Moving or speaking so slowly that other people could have noticed; or the opposite being so fidgety that others notice -   Thoughts of being better off dead, or hurting yourself in some way -   PHQ 9 Score -   How difficult have these problems made it for you to do your work, take care of your home and get along with others -       Learning Assessment:  Learning Assessment 1/19/2021   PRIMARY LEARNER Patient   HIGHEST LEVEL OF EDUCATION - PRIMARY LEARNER  -   BARRIERS PRIMARY LEARNER -   PRIMARY LANGUAGE ENGLISH   LEARNER PREFERENCE PRIMARY DEMONSTRATION   ANSWERED BY patient   RELATIONSHIP SELF       Abuse Screening:  Abuse Screening Questionnaire 4/13/2021   Do you ever feel afraid of your partner? N   Are you in a relationship with someone who physically or mentally threatens you? N   Is it safe for you to go home? Y       Fall Risk  Fall Risk Assessment, last 12 mths 4/13/2021   Able to walk? Yes   Fall in past 12 months? 0   Do you feel unsteady? 0   Are you worried about falling 0       Pt currently taking Anticoagulant therapy? Eliquis 5mg     Coordination of Care:  1.  Have you been to the ER, urgent care clinic since your last visit? Hospitalized since your last visit? no    2. Have you seen or consulted any other health care providers outside of the [address] [place] [address] since your last visit? Include any pap smears or colon screening.  no

## 2021-04-13 NOTE — PROGRESS NOTES
HISTORY OF PRESENT ILLNESS  Bigg Kim is a 72 y.o. male. Follow-up  Associated symptoms include shortness of breath. Pertinent negatives include no chest pain, no abdominal pain and no headaches. Patient presents for a follow-up office visit. He was initially hospitalized in 2020 with new onset atrial fibrillation and rapid ventricular response and was found to have a severe dilated nonischemic cardiomyopathy with ejection fraction of less than 15%. He did have some mild heart failure symptoms during that admission. An echocardiogram demonstrated a mildly dilated left ventricle, EF less than 15%, mild aortic regurgitation mild aortic root dilatation and biatrial enlargement. He also underwent a cardiac catheterization during that admission which showed single-vessel disease of his ramus intermedius branch with a dilated cardiomyopathy out of proportion to his degree of coronary disease, thus a diagnosis of a dilated nonischemic cardiomyopathy. The patient does have a history of non-Hodgkin's lymphoma receiving CHOP chemotherapy 6 to 7 years ago. He reports that he recently started drinking heavily in 2020 after his wife  after battling cancer. He was drinking almost 750 cc of vodka daily. Following hospital discharge in October he states he briefly quit drinking but unfortunately started back up and unfortunately was readmitted to the hospital at the beginning of 2020 for acute on chronic systolic heart failure and rapid atrial fibrillation. He was diuresed, his medications were adjusted and he was discharged home. Since hospital discharge, he states his been free of alcohol. He continues to wear a LifeVest.      The patient recently underwent a follow-up echocardiogram in 2021 after being on appropriate medical therapy for 90 days. His ejection fraction remains severely depressed, EF 15 to 20% with global hypokinesis.     He returns today to discuss implantation of an AICD for primary prevention of sudden cardiac death. He states he has been feeling much better since his medications have been optimized. He feels his activity tolerance has improved. He still will get short of breath with heavy exertional activity, but can complete all normal day-to-day activities of daily living. Denies any leg swelling, no orthopnea, no PND. No prolonged heart palpitations, dizziness, nor syncope. Past Medical History:   Diagnosis Date    Atrial fibrillation ([facility] 10/2020    Gastrointestinal disorder     H/O cardiac catheterization 10/2020    Ramus intermedius with 90% lesion, otherwise clean coronary arteries. LVEDP 18 mmHg    Heart failure (HCC)     Hep C w/o coma, chronic ([facility] 8-10-13    Liver disease current    non-hodgkins lymphoma    NHL (nodular histiocytic lymphoma) (HCC)     [facility] 2020    EF less than 15%     Current Outpatient Medications   Medication Sig Dispense Refill    apixaban (ELIQUIS) 5 mg tablet Take 1 Tab by mouth two (2) times a day. 60 Tab 6    sacubitriL-valsartan (Entresto) 24-26 mg tablet Take 1 Tab by mouth two (2) times a day. 60 Tab 4    spironolactone (ALDACTONE) 25 mg tablet Take 0.5 Tabs by mouth daily. 30 Tab 5    carvediloL (COREG) 6.25 mg tablet Take 1 Tab by mouth every twelve (12) hours. 60 Tab 6    digoxin (LANOXIN) 0.125 mg tablet Take 1 Tab by mouth daily. 30 Tab 6     Allergies   Allergen Reactions    Codeine Other (comments)     Pain in his abdominal area.     Acetaminophen Other (comments)     Patient states cannot have acetaminophen due to hep C    Lidocaine Palpitations    Lipitor [Atorvastatin] Other (comments)      Social History     Tobacco Use    Smoking status: Former Smoker     Packs/day: 2.00     Years: 40.00     Pack years: 80.00     Types: Cigarettes     Quit date: 10/9/2013     Years since quittin.5    Smokeless tobacco: Never Used   Substance Use Topics    Alcohol use: Yes     Frequency: 2-3 times a week    Drug use: No     Comment: drinks generally 2-3 drinks per week, however  drank 6 mixed drinks yesterday     Family History   Problem Relation Age of Onset    Cancer Mother 76        lung cancer    Stroke Mother 76    Diabetes Mother 67    Heart Attack Father 76        cause of death    Heart Attack Paternal Grandfather 72       Review of Systems   Constitutional: Negative for chills, fever and weight loss. HENT: Negative for nosebleeds. Eyes: Negative for blurred vision and double vision. Respiratory: Positive for shortness of breath. Negative for cough and wheezing. Cardiovascular: Negative for chest pain, palpitations, orthopnea, claudication, leg swelling and PND. Gastrointestinal: Negative for abdominal pain, heartburn, nausea and vomiting. Genitourinary: Negative for dysuria and hematuria. Musculoskeletal: Negative for falls and myalgias. Skin: Negative for rash. Neurological: Negative for dizziness, focal weakness and headaches. Endo/Heme/Allergies: Does not bruise/bleed easily. Psychiatric/Behavioral: Negative for substance abuse. Visit Vitals  /82 (BP 1 Location: Left upper arm, BP Patient Position: Sitting, BP Cuff Size: Adult)   Pulse 72   Ht 5' 10\" (1.778 m)   Wt 100.2 kg (221 lb)   SpO2 98%   BMI 31.71 kg/m²       Physical Exam   Constitutional: He is oriented to person, place, and time. He appears well-developed and well-nourished. HENT:   Head: Normocephalic and atraumatic. Eyes: Conjunctivae are normal.   Neck: Neck supple. No JVD present. Carotid bruit is not present. Cardiovascular: Normal rate, S1 normal, S2 normal and normal pulses. An irregularly irregular rhythm present. Exam reveals no gallop and no S3. No murmur heard. Pulmonary/Chest: Breath sounds normal. He has no wheezes. He has no rales. Abdominal: Soft. Bowel sounds are normal. There is no abdominal tenderness.    Musculoskeletal: General: No tenderness, deformity or edema. Neurological: He is alert and oriented to person, place, and time. Skin: Skin is warm and dry. Psychiatric: He has a normal mood and affect. His behavior is normal. Thought content normal.     EKG: Atrial fibrillation, controlled ventricular rate in the 70s, left axis deviation, poor R wave progression, nonspecific IVCD, nonspecific T wave abnormality. Compared to the previous EKG, no significant will change. ASSESSMENT and PLAN    Chronic systolic heart failure. Patient remains euvolemic on exam today. He continues to have NYHA class II symptomatology. He has remained on optimal medical therapy for the past 3 months. Unfortunately, his LV function has not significantly improved. His ejection fraction remains in the 15 to 20% range on a recent echocardiogram done earlier this month in April 2021. I have recommended pursuing an AICD implantation for primary prevention of sudden cardiac death. The patient would like to think the procedure over prior to scheduling. I did discuss at length the risks and benefits of the procedure. Nonischemic dilated cardiomyopathy. Initially diagnosed in October 2020. EF less than 15% at that time by echocardiogram.  No severe coronary disease was identified on cardiac catheterization. He did have a ramus intermedius lesion which was out of proportion to the degree of his cardiomyopathy. This may be due to multiple toxins such as history of Adriamycin based chemotherapy years ago and also recent heavy alcohol abuse which he has recently quit. This may also be from prolonged tachycardia. He continues to wear a LifeVest.  A repeat echocardiogram remains severely depressed as described above. Atrial fibrillation. This is now likely persistent over the past few months. He is anticoagulated with Eliquis. His heart rates are now much better controlled with the increased dose of carvedilol and addition of digoxin. I will continue his current rate controlling regimen and anticoagulation. History of heavy alcohol abuse. This occurred just recently over the past 4+ months since his wife passed away. He states in the past he was more of a binge drinker on the weekends. He has now abstained from alcohol since hospital discharge in December 2020. I have recommended he continue to refrain from drinking alcohol.     Follow-up in 3 months, sooner if needed

## 2021-04-19 ENCOUNTER — TELEPHONE (OUTPATIENT)
Dept: CARDIOLOGY CLINIC | Age: 71
End: 2021-04-19

## 2021-04-19 NOTE — TELEPHONE ENCOUNTER
Patient called stating that he can not afford his Eliquis 5 mg or Entrestro. Reviewed with Dr. Shin Diehl he highly recommends patient keeps these current medication and set up patient assistance. Patient agreed to have assistance set up on his behalf.

## 2021-06-30 RX ORDER — DIGOXIN 125 MCG
0.12 TABLET ORAL DAILY
Qty: 30 TABLET | Refills: 6 | Status: SHIPPED | OUTPATIENT
Start: 2021-06-30 | End: 2021-12-08 | Stop reason: SDUPTHER

## 2021-06-30 RX ORDER — CARVEDILOL 6.25 MG/1
6.25 TABLET ORAL EVERY 12 HOURS
Qty: 60 TABLET | Refills: 6 | Status: SHIPPED | OUTPATIENT
Start: 2021-06-30 | End: 2021-12-08 | Stop reason: SDUPTHER

## 2021-08-03 PROBLEM — I48.91 A-FIB (HCC): Status: RESOLVED | Noted: 2020-12-01 | Resolved: 2021-08-03

## 2021-08-03 PROBLEM — I50.9 CHF (CONGESTIVE HEART FAILURE) (HCC): Status: RESOLVED | Noted: 2020-10-19 | Resolved: 2021-08-03

## 2021-11-01 RX ORDER — SPIRONOLACTONE 25 MG/1
TABLET ORAL
Qty: 30 TABLET | Refills: 5 | Status: SHIPPED | OUTPATIENT
Start: 2021-11-01 | End: 2022-06-21 | Stop reason: SDUPTHER

## 2021-12-09 RX ORDER — DIGOXIN 125 MCG
0.12 TABLET ORAL DAILY
Qty: 30 TABLET | Refills: 6 | Status: SHIPPED | OUTPATIENT
Start: 2021-12-09 | End: 2022-06-21 | Stop reason: SDUPTHER

## 2021-12-09 RX ORDER — CARVEDILOL 6.25 MG/1
6.25 TABLET ORAL EVERY 12 HOURS
Qty: 60 TABLET | Refills: 6 | Status: SHIPPED | OUTPATIENT
Start: 2021-12-09 | End: 2022-06-21 | Stop reason: SDUPTHER

## 2022-02-11 ENCOUNTER — TELEPHONE (OUTPATIENT)
Dept: CARDIOLOGY CLINIC | Age: 72
End: 2022-02-11

## 2022-02-11 NOTE — TELEPHONE ENCOUNTER
Papers sent to [facility] to start renewal approval for Eliquis for Patient Assistance.  Awaiting approval.

## 2022-02-24 NOTE — TELEPHONE ENCOUNTER
Patient has been approved for Patient Assistance through [facility] until 12/31/22     This has been fully explained to the patient, who indicates understanding.

## 2022-03-18 PROBLEM — F10.10 ALCOHOL ABUSE: Status: ACTIVE | Noted: 2020-12-17

## 2022-03-18 PROBLEM — I48.91 NEW ONSET ATRIAL FIBRILLATION (HCC): Status: ACTIVE | Noted: 2020-10-17

## 2022-03-19 PROBLEM — R06.02 SHORTNESS OF BREATH: Status: ACTIVE | Noted: 2020-10-17

## 2022-03-19 PROBLEM — I42.8 NONISCHEMIC CARDIOMYOPATHY (HCC): Status: ACTIVE | Noted: 2020-12-17

## 2022-03-20 PROBLEM — I50.9 CHF EXACERBATION (HCC): Status: ACTIVE | Noted: 2020-10-17

## 2022-03-20 PROBLEM — I50.9 ACUTE CONGESTIVE HEART FAILURE (HCC): Status: ACTIVE | Noted: 2020-10-17

## 2022-06-21 RX ORDER — DIGOXIN 125 MCG
0.12 TABLET ORAL DAILY
Qty: 90 TABLET | Refills: 3 | Status: SHIPPED | OUTPATIENT
Start: 2022-06-21

## 2022-06-21 RX ORDER — SPIRONOLACTONE 25 MG/1
12.5 TABLET ORAL DAILY
Qty: 45 TABLET | Refills: 3 | Status: SHIPPED | OUTPATIENT
Start: 2022-06-21

## 2022-06-21 RX ORDER — CARVEDILOL 6.25 MG/1
6.25 TABLET ORAL EVERY 12 HOURS
Qty: 90 TABLET | Refills: 3 | Status: SHIPPED | OUTPATIENT
Start: 2022-06-21

## 2022-06-27 ENCOUNTER — OFFICE VISIT (OUTPATIENT)
Dept: CARDIOLOGY CLINIC | Age: 72
End: 2022-06-27
Payer: MEDICARE

## 2022-06-27 VITALS
WEIGHT: 229 LBS | HEART RATE: 79 BPM | SYSTOLIC BLOOD PRESSURE: 122 MMHG | BODY MASS INDEX: 32.78 KG/M2 | DIASTOLIC BLOOD PRESSURE: 88 MMHG | OXYGEN SATURATION: 96 % | HEIGHT: 70 IN

## 2022-06-27 DIAGNOSIS — I50.22 CHRONIC SYSTOLIC CONGESTIVE HEART FAILURE (HCC): ICD-10-CM

## 2022-06-27 DIAGNOSIS — I48.19 PERSISTENT ATRIAL FIBRILLATION (HCC): ICD-10-CM

## 2022-06-27 DIAGNOSIS — I42.8 NONISCHEMIC CARDIOMYOPATHY (HCC): Primary | ICD-10-CM

## 2022-06-27 DIAGNOSIS — I45.4 IVCD (INTRAVENTRICULAR CONDUCTION DEFECT): ICD-10-CM

## 2022-06-27 DIAGNOSIS — F10.11 ALCOHOL ABUSE, IN REMISSION: ICD-10-CM

## 2022-06-27 PROCEDURE — 99214 OFFICE O/P EST MOD 30 MIN: CPT | Performed by: INTERNAL MEDICINE

## 2022-06-27 PROCEDURE — 93000 ELECTROCARDIOGRAM COMPLETE: CPT | Performed by: INTERNAL MEDICINE

## 2022-06-27 PROCEDURE — 1123F ACP DISCUSS/DSCN MKR DOCD: CPT | Performed by: INTERNAL MEDICINE

## 2022-06-27 NOTE — PROGRESS NOTES
Agnieszka De Dios presents today for   Chief Complaint   Patient presents with    Follow-up     overdue [place]. preferred language for health care discussion is english/other. Is someone accompanying this pt? no    Is the patient using any DME equipment during [address]? no    Depression Screening:  3 most recent PHQ Screens 6/27/2022   Little interest or pleasure in doing things Not at all   Feeling down, depressed, irritable, or hopeless Not at all   Total Score PHQ 2 0   Trouble falling or staying asleep, or sleeping too much -   Feeling tired or having little energy -   Poor appetite, weight loss, or overeating -   Feeling bad about yourself - or that you are a failure or have let yourself or your family down -   Trouble concentrating on things such as school, work, reading, or watching TV -   Moving or speaking so slowly that other people could have noticed; or the opposite being so fidgety that others notice -   Thoughts of being better off dead, or hurting yourself in some way -   PHQ 9 Score -   How difficult have these problems made it for you to do your work, take care of your home and get along with others -       Learning Assessment:  Learning Assessment 6/27/2022   PRIMARY LEARNER Patient   HIGHEST LEVEL OF EDUCATION - PRIMARY LEARNER  -   BARRIERS PRIMARY LEARNER -   PRIMARY LANGUAGE ENGLISH   LEARNER PREFERENCE PRIMARY DEMONSTRATION   ANSWERED BY patient   RELATIONSHIP SELF       Abuse Screening:  Abuse Screening Questionnaire 6/27/2022   Do you ever feel afraid of your partner? N   Are you in a relationship with someone who physically or mentally threatens you? N   Is it safe for you to go home? Y       Fall Risk  Fall Risk Assessment, last 12 mths 6/27/2022   Able to walk? Yes   Fall in past 12 months? 0   Do you feel unsteady? 0   Are you worried about falling 0           Pt currently taking Anticoagulant therapy? eliquis 5 mg bid    Pt currently taking Antiplatelet therapy ? no      Coordination of Care:  1. Have you been to the ER, urgent care clinic since your last visit? Hospitalized since your last visit? no    2. Have you seen or consulted any other health care providers outside of the [address] [place] [address] since your last visit? Include any pap smears or colon screening.  no

## 2022-06-27 NOTE — PROGRESS NOTES
HISTORY OF PRESENT ILLNESS  Melissa Gonzalez is a 80 y.o. male. Follow-up  Associated symptoms include shortness of breath. Pertinent negatives include no chest pain, no abdominal pain and no headaches. Patient presents for an overdue follow-up office visit. He was initially hospitalized in 2020 with new onset atrial fibrillation and rapid ventricular response and was found to have a severe dilated nonischemic cardiomyopathy with ejection fraction of less than 15%. He did have some mild heart failure symptoms during that admission. An echocardiogram demonstrated a mildly dilated left ventricle, EF less than 15%, mild aortic regurgitation mild aortic root dilatation and biatrial enlargement. He also underwent a cardiac catheterization during that admission which showed single-vessel disease of his ramus intermedius branch with a dilated cardiomyopathy out of proportion to his degree of coronary disease, thus a diagnosis of a dilated nonischemic cardiomyopathy. The patient does have a history of non-Hodgkin's lymphoma receiving CHOP chemotherapy 6 to 7 years ago. He reports that he recently started drinking heavily in 2020 after his wife  after battling cancer. He was drinking almost 750 cc of vodka daily. Following hospital discharge in October he states he briefly quit drinking but unfortunately started back up and unfortunately was readmitted to the hospital at the beginning of 2020 for acute on chronic systolic heart failure and rapid atrial fibrillation. He was diuresed, his medications were adjusted and he was discharged home. The patient recently underwent a follow-up echocardiogram in 2021 after being on appropriate medical therapy for 90 days. His ejection fraction remains severely depressed, EF 15 to 20% with global hypokinesis. He has not been seen in our office in over a year. At last visit he decided not to pursue an AICD.   He has since stopped taking his Entresto altogether due to severe hypotension with the medication with systolic blood pressure readings as low as 75 with symptoms of dizziness and lightheadedness. Since last visit, he states his activity tolerance has improved. He states he is able to walk his dog now 1 mile twice a day without any shortness of breath. He was told get winded with heavy exertional activity but now with normal day-to-day activities or walking at regular speeds. He denies any leg swelling, no orthopnea, no PND. No heart palpitations, dizzy spells, nor syncope. Past Medical History:   Diagnosis Date    Atrial fibrillation ([facility] 10/2020    Gastrointestinal disorder     H/O cardiac catheterization 10/2020    Ramus intermedius with 90% lesion, otherwise clean coronary arteries. LVEDP 18 mmHg    Heart failure (HCC)     Hep C w/o coma, chronic ([facility] 8-10-13    Liver disease current    non-hodgkins lymphoma    NHL (nodular histiocytic lymphoma) (HCC)     Nonischemic cardiomyopathy ([facility] 2020    EF less than 15%     Current Outpatient Medications   Medication Sig Dispense Refill    carvediloL (COREG) 6.25 mg tablet Take 1 Tablet by mouth every twelve (12) hours. 90 Tablet 3    digoxin (LANOXIN) 0.125 mg tablet Take 1 Tablet by mouth daily. 90 Tablet 3    spironolactone (ALDACTONE) 25 mg tablet Take 0.5 Tablets by mouth daily. 45 Tablet 3    apixaban (ELIQUIS) 5 mg tablet Take 1 Tablet by mouth two (2) times a day. 60 Tablet 6     Allergies   Allergen Reactions    Codeine Other (comments)     Pain in his abdominal area.     Acetaminophen Other (comments)     Patient states cannot have acetaminophen due to hep C    Lidocaine Palpitations    Lipitor [Atorvastatin] Other (comments)      Social History     Tobacco Use    Smoking status: Former Smoker     Packs/day: 2.00     Years: 40.00     Pack years: 80.00     Types: Cigarettes     Quit date: 10/9/2013     Years since quittin.7    Smokeless tobacco: Never Used   Vaping Use    Vaping Use: Never used   Substance Use Topics    Alcohol use: Yes    Drug use: No     Comment: drinks generally 2-3 drinks per week, however  drank 6 mixed drinks yesterday     Family History   Problem Relation Age of Onset    Cancer Mother 76        lung cancer    Stroke Mother 76    Diabetes Mother 67    Heart Attack Father 76        cause of death    Heart Attack Paternal Grandfather 72       Review of Systems   Constitutional: Negative for chills, fever and weight loss. HENT: Negative for nosebleeds. Eyes: Negative for blurred vision and double vision. Respiratory: Positive for shortness of breath. Negative for cough and wheezing. Cardiovascular: Negative for chest pain, palpitations, orthopnea, claudication, leg swelling and PND. Gastrointestinal: Negative for abdominal pain, heartburn, nausea and vomiting. Genitourinary: Negative for dysuria and hematuria. Musculoskeletal: Negative for falls and myalgias. Skin: Negative for rash. Neurological: Negative for dizziness, focal weakness and headaches. Endo/Heme/Allergies: Does not bruise/bleed easily. Psychiatric/Behavioral: Negative for substance abuse. Visit Vitals  /88 (BP 1 Location: Left upper arm, BP Patient Position: Sitting, BP Cuff Size: Large adult)   Pulse 79   Ht 5' 10\" (1.778 m)   Wt 103.9 kg (229 lb)   SpO2 96%   BMI 32.86 kg/m²       Physical Exam  Constitutional:       Appearance: He is well-developed. HENT:      Head: Normocephalic and atraumatic. Eyes:      Conjunctiva/sclera: Conjunctivae normal.   Neck:      Vascular: No carotid bruit or JVD. Cardiovascular:      Rate and Rhythm: Normal rate. Rhythm irregularly irregular. Pulses: Normal pulses. Heart sounds: S1 normal and S2 normal. No murmur heard. No gallop. No S3 sounds. Pulmonary:      Breath sounds: Normal breath sounds. No wheezing or rales.    Abdominal:      General: Bowel sounds are normal. Palpations: Abdomen is soft. Tenderness: There is no abdominal tenderness. Musculoskeletal:         General: No swelling, tenderness or deformity. Cervical back: Neck supple. Skin:     General: Skin is warm and dry. Neurological:      General: No focal deficit present. Mental Status: He is alert and oriented to person, place, and time. Psychiatric:         Behavior: Behavior normal.         Thought Content: Thought content normal.       EKG: Atrial fibrillation, controlled ventricular rate in the 70s, left axis deviation, poor R wave progression, nonspecific IVCD, nonspecific T wave abnormality. Compared to the previous EKG, no significant will change. ASSESSMENT and PLAN    Chronic systolic heart failure. Patient still appears euvolemic on exam today. He now has NYHA class I-II symptomatology. His ejection fraction remains in the 15 to 20% range on a prior echocardiogram from April 2021. Patient decided against pursuing an AICD at last visit. He no longer tolerates Entresto due to low blood pressure. I recommended repeat an echocardiogram to see if his LV function has improved. If his LV function remains significantly depressed, I would consider adding a very low-dose ARB to his regimen and possibly an SGLT2 inhibitor. Nonischemic dilated cardiomyopathy. Initially diagnosed in October 2020. EF less than 15% at that time by echocardiogram.  No severe coronary disease was identified on cardiac catheterization. He did have a ramus intermedius lesion which was out of proportion to the degree of his cardiomyopathy. This may be due to multiple toxins such as history of Adriamycin based chemotherapy years ago and also recent heavy alcohol abuse. Patient has been avoiding alcohol. He may have had a rare drink over the past 12 months, but not on a regular basis. Repeat echocardiogram will be scheduled as described above. Atrial fibrillation.   This is now likely persistent over the past few months. He is anticoagulated with Eliquis. His heart rates now appear to be controlled on stable dosages of carvedilol and  digoxin. I would continue his current rate controlling regimen and anticoagulation. History of heavy alcohol abuse. Patient reports that he has not been consuming alcohol regularly over the past year. He states he may have had a few drinks total since last visit. I have recommended he continue to refrain from drinking alcohol given his cardiomyopathy. Follow-up in 6 months, sooner if needed.

## 2022-12-27 ENCOUNTER — OFFICE VISIT (OUTPATIENT)
Dept: CARDIOLOGY CLINIC | Age: 72
End: 2022-12-27
Payer: MEDICARE

## 2022-12-27 VITALS
OXYGEN SATURATION: 98 % | DIASTOLIC BLOOD PRESSURE: 70 MMHG | WEIGHT: 228 LBS | HEIGHT: 70 IN | BODY MASS INDEX: 32.64 KG/M2 | SYSTOLIC BLOOD PRESSURE: 130 MMHG | HEART RATE: 70 BPM

## 2022-12-27 DIAGNOSIS — I48.19 PERSISTENT ATRIAL FIBRILLATION (HCC): ICD-10-CM

## 2022-12-27 DIAGNOSIS — I45.4 IVCD (INTRAVENTRICULAR CONDUCTION DEFECT): ICD-10-CM

## 2022-12-27 DIAGNOSIS — I42.8 NONISCHEMIC CARDIOMYOPATHY (HCC): Primary | ICD-10-CM

## 2022-12-27 DIAGNOSIS — I50.22 CHRONIC SYSTOLIC CONGESTIVE HEART FAILURE (HCC): ICD-10-CM

## 2022-12-27 DIAGNOSIS — F10.11 ALCOHOL ABUSE, IN REMISSION: ICD-10-CM

## 2022-12-27 PROCEDURE — 3017F COLORECTAL CA SCREEN DOC REV: CPT | Performed by: INTERNAL MEDICINE

## 2022-12-27 PROCEDURE — G8417 CALC BMI ABV UP PARAM F/U: HCPCS | Performed by: INTERNAL MEDICINE

## 2022-12-27 PROCEDURE — G8427 DOCREV CUR MEDS BY ELIG CLIN: HCPCS | Performed by: INTERNAL MEDICINE

## 2022-12-27 PROCEDURE — 1123F ACP DISCUSS/DSCN MKR DOCD: CPT | Performed by: INTERNAL MEDICINE

## 2022-12-27 PROCEDURE — G8510 SCR DEP NEG, NO PLAN REQD: HCPCS | Performed by: INTERNAL MEDICINE

## 2022-12-27 PROCEDURE — 1101F PT FALLS ASSESS-DOCD LE1/YR: CPT | Performed by: INTERNAL MEDICINE

## 2022-12-27 PROCEDURE — 93000 ELECTROCARDIOGRAM COMPLETE: CPT | Performed by: INTERNAL MEDICINE

## 2022-12-27 PROCEDURE — G8536 NO DOC ELDER MAL SCRN: HCPCS | Performed by: INTERNAL MEDICINE

## 2022-12-27 PROCEDURE — 99214 OFFICE O/P EST MOD 30 MIN: CPT | Performed by: INTERNAL MEDICINE

## 2022-12-27 RX ORDER — SPIRONOLACTONE 25 MG/1
12.5 TABLET ORAL DAILY
Qty: 45 TABLET | Refills: 3 | Status: SHIPPED | OUTPATIENT
Start: 2022-12-27

## 2022-12-27 RX ORDER — CARVEDILOL 6.25 MG/1
6.25 TABLET ORAL EVERY 12 HOURS
Qty: 180 TABLET | Refills: 3 | Status: SHIPPED | OUTPATIENT
Start: 2022-12-27

## 2022-12-27 RX ORDER — DIGOXIN 125 MCG
0.12 TABLET ORAL DAILY
Qty: 90 TABLET | Refills: 3 | Status: SHIPPED | OUTPATIENT
Start: 2022-12-27

## 2022-12-27 NOTE — PROGRESS NOTES
Roberto Egan presents today for   Chief Complaint   Patient presents with    Follow-up     6 month       Robertose Egan. preferred language for health care discussion is english/other. Is someone accompanying this pt? no    Is the patient using any DME equipment during [address]? no    Depression Screening:  3 most recent PHQ Screens 12/27/2022   Little interest or pleasure in doing things Not at all   Feeling down, depressed, irritable, or hopeless Not at all   Total Score PHQ 2 0   Trouble falling or staying asleep, or sleeping too much -   Feeling tired or having little energy -   Poor appetite, weight loss, or overeating -   Feeling bad about yourself - or that you are a failure or have let yourself or your family down -   Trouble concentrating on things such as school, work, reading, or watching TV -   Moving or speaking so slowly that other people could have noticed; or the opposite being so fidgety that others notice -   Thoughts of being better off dead, or hurting yourself in some way -   PHQ 9 Score -   How difficult have these problems made it for you to do your work, take care of your home and get along with others -       Learning Assessment:  Learning Assessment 12/27/2022   PRIMARY LEARNER Patient   HIGHEST LEVEL OF EDUCATION - PRIMARY LEARNER  -   BARRIERS PRIMARY LEARNER -   PRIMARY LANGUAGE ENGLISH   LEARNER PREFERENCE PRIMARY DEMONSTRATION   ANSWERED BY patient   RELATIONSHIP SELF       Abuse Screening:  Abuse Screening Questionnaire 12/27/2022   Do you ever feel afraid of your partner? N   Are you in a relationship with someone who physically or mentally threatens you? N   Is it safe for you to go home? Y       Fall Risk  Fall Risk Assessment, last 12 mths 12/27/2022   Able to walk? Yes   Fall in past 12 months? 0   Do you feel unsteady? 0   Are you worried about falling 0           Pt currently taking Anticoagulant therapy? Eliquis 5 mg bid    Pt currently taking Antiplatelet therapy ? no      Coordination of Care:  1. Have you been to the ER, urgent care clinic since your last visit? Hospitalized since your last visit? no    2. Have you seen or consulted any other health care providers outside of the [address] [place] [address] since your last visit? Include any pap smears or colon screening.  no

## 2022-12-27 NOTE — PROGRESS NOTES
HISTORY OF PRESENT ILLNESS  Neo Velasquez is a 71 y.o. male. Follow-up  Associated symptoms include chest pain (Atypical) and shortness of breath. Pertinent negatives include no abdominal pain and no headaches. Patient presents for a follow-up office visit. He was initially hospitalized in 2020 with new onset atrial fibrillation and rapid ventricular response and was found to have a severe dilated nonischemic cardiomyopathy with ejection fraction of less than 15%. He did have some mild heart failure symptoms during that admission. An echocardiogram demonstrated a mildly dilated left ventricle, EF less than 15%, mild aortic regurgitation mild aortic root dilatation and biatrial enlargement. He also underwent a cardiac catheterization during that admission which showed single-vessel disease of his ramus intermedius branch with a dilated cardiomyopathy out of proportion to his degree of coronary disease, thus a diagnosis of a dilated nonischemic cardiomyopathy. The patient does have a history of non-Hodgkin's lymphoma receiving CHOP chemotherapy 6 to 7 years ago. He reports that he recently started drinking heavily in 2020 after his wife  after battling cancer. He was drinking almost 750 cc of vodka daily. Following hospital discharge in October he states he briefly quit drinking but unfortunately started back up and unfortunately was readmitted to the hospital at the beginning of 2020 for acute on chronic systolic heart failure and rapid atrial fibrillation. He was diuresed, his medications were adjusted and he was discharged home. The patient recently underwent a follow-up echocardiogram in 2021 after being on appropriate medical therapy for 90 days. His ejection fraction remains severely depressed, EF 15-20% with global hypokinesis. At last visit he decided not to pursue an AICD.   He has since stopped taking his Entresto altogether due to severe hypotension with the medication with systolic blood pressure readings as low as 75 with symptoms of dizziness and lightheadedness. He was supposed undergo a follow-up echocardiogram earlier this year, but did not show up for the study. He was last seen in our office 6 months ago. Since last visit, he states his activity tolerance is unchanged. He still gets short of breath with heavy strenuous activity, but does well with normal day-to-day activities. He has noted some left-sided chest discomfort primarily under his breast which she describes as a dull ache which usually occurs when he is sleeping at night. This is not present with exertion. He denies any leg swelling, weight gain, orthopnea, or PND. No prolonged heart palpitations, dizzy spells, nor syncope. Past Medical History:   Diagnosis Date    Atrial fibrillation ([facility] 10/2020    Gastrointestinal disorder     H/O cardiac catheterization 10/2020    Ramus intermedius with 90% lesion, otherwise clean coronary arteries. LVEDP 18 mmHg    Heart failure (HCC)     Hep C w/o coma, chronic ([address] 8-10-13    Liver disease current    non-hodgkins lymphoma    NHL (nodular histiocytic lymphoma) (HCC)     Nonischemic cardiomyopathy ([facility] 12/2020    EF less than 15%     Current Outpatient Medications   Medication Sig Dispense Refill    carvediloL (COREG) 6.25 mg tablet Take 1 Tablet by mouth every twelve (12) hours. 180 Tablet 3    digoxin (LANOXIN) 0.125 mg tablet Take 1 Tablet by mouth daily. 90 Tablet 3    spironolactone (ALDACTONE) 25 mg tablet Take 0.5 Tablets by mouth daily. 45 Tablet 3    apixaban (ELIQUIS) 5 mg tablet Take 1 Tablet by mouth two (2) times a day. 60 Tablet 6     Allergies   Allergen Reactions    Codeine Other (comments)     Pain in his abdominal area.     Acetaminophen Other (comments)     Patient states cannot have acetaminophen due to hep C    Lidocaine Palpitations    Lipitor [Atorvastatin] Other (comments)      Social History Tobacco Use    Smoking status: Former     Packs/day: 2.00     Years: 40.00     Pack years: 80.00     Types: Cigarettes     Quit date: 10/9/2013     Years since quittin.2    Smokeless tobacco: Never   Vaping Use    Vaping Use: Never used   Substance Use Topics    Alcohol use: Yes    Drug use: No     Comment: drinks generally 2-3 drinks per week, however  drank 6 mixed drinks yesterday     Family History   Problem Relation Age of Onset    Cancer Mother 76        lung cancer    Stroke Mother 76    Diabetes Mother 67    Heart Attack Father 76        cause of death    Heart Attack Paternal Grandfather 72       Review of Systems   Constitutional:  Negative for chills, fever and weight loss. HENT:  Negative for nosebleeds. Eyes:  Negative for blurred vision and double vision. Respiratory:  Positive for shortness of breath. Negative for cough and wheezing. Cardiovascular:  Positive for chest pain (Atypical). Negative for palpitations, orthopnea, claudication, leg swelling and PND. Gastrointestinal:  Negative for abdominal pain, heartburn, nausea and vomiting. Genitourinary:  Negative for dysuria and hematuria. Musculoskeletal:  Negative for falls and myalgias. Skin:  Negative for rash. Neurological:  Negative for dizziness, focal weakness and headaches. Endo/Heme/Allergies:  Does not bruise/bleed easily. Psychiatric/Behavioral:  Negative for substance abuse. Visit Vitals  /70 (BP 1 Location: Left upper arm, BP Patient Position: Sitting, BP Cuff Size: Adult)   Pulse 70   Ht 5' 10\" (1.778 m)   Wt 103.4 kg (228 lb)   SpO2 98%   BMI 32.71 kg/m²       Physical Exam  Constitutional:       Appearance: He is well-developed. HENT:      Head: Normocephalic and atraumatic. Eyes:      Conjunctiva/sclera: Conjunctivae normal.   Neck:      Vascular: No carotid bruit or JVD. Cardiovascular:      Rate and Rhythm: Normal rate. Rhythm irregularly irregular. Pulses: Normal pulses.       Heart sounds: S1 normal and S2 normal. No murmur heard. No gallop. No S3 sounds. Pulmonary:      Breath sounds: Normal breath sounds. No wheezing or rales. Abdominal:      General: Bowel sounds are normal.      Palpations: Abdomen is soft. Tenderness: There is no abdominal tenderness. Musculoskeletal:         General: No swelling, tenderness or deformity. Cervical back: Neck supple. Skin:     General: Skin is warm and dry. Neurological:      General: No focal deficit present. Mental Status: He is alert and oriented to person, place, and time. Psychiatric:         Behavior: Behavior normal.         Thought Content: Thought content normal.     EKG: Atrial fibrillation, controlled ventricular rate around 70, left axis deviation, poor R wave progression, nonspecific IVCD, nonspecific T wave abnormality. Compared to the previous EKG, no significant change. ASSESSMENT and PLAN    Chronic systolic heart failure. Patient still appears euvolemic on exam today. He continues to have stable NYHA class II symptomatology. His ejection fraction was in the 15-20% range on a prior echocardiogram from April 2021. Patient decided against pursuing an AICD last year. He no longer tolerates Entresto due to low blood pressure. I have again recommended repeat an echocardiogram to see if his LV function has improved. If his LV function remains significantly depressed, I would consider adding a very low-dose ARB to his regimen and possibly an SGLT2 inhibitor. Nonischemic dilated cardiomyopathy. Initially diagnosed in October 2020. EF less than 15% at that time by echocardiogram.  No severe coronary disease was identified on cardiac catheterization. He did have a ramus intermedius lesion which was out of proportion to the degree of his cardiomyopathy. This may be due to multiple toxins such as history of Adriamycin based chemotherapy years ago and also recent heavy alcohol abuse.   Patient has been avoiding alcohol. A repeat echocardiogram will be scheduled as described above. Persistent atrial fibrillation. He is anticoagulated with Eliquis. His heart rates now appear to be controlled on stable dosages of carvedilol and  digoxin. I would continue his current rate controlling regimen and anticoagulation. History of heavy alcohol abuse. Patient reports that he has not been consuming alcohol regularly over the past year. He states he may have had a few drinks total since last visit. I have recommended he continue to refrain from drinking alcohol given his cardiomyopathy. Follow-up in 6 months, sooner if needed.

## 2023-06-12 PROBLEM — I48.11 LONGSTANDING PERSISTENT ATRIAL FIBRILLATION (HCC): Status: ACTIVE | Noted: 2020-10-17

## 2023-06-28 RX ORDER — DIGOXIN 125 MCG
0.12 TABLET ORAL DAILY
Qty: 90 TABLET | Refills: 3 | Status: SHIPPED | OUTPATIENT
Start: 2023-06-28

## 2023-10-05 PROBLEM — I25.10 CAD IN NATIVE ARTERY: Status: ACTIVE | Noted: 2023-10-05

## 2023-10-05 PROBLEM — I21.3 STEMI (ST ELEVATION MYOCARDIAL INFARCTION) (HCC): Status: ACTIVE | Noted: 2023-10-05

## 2023-10-07 PROBLEM — I25.5 ISCHEMIC CARDIOMYOPATHY: Status: ACTIVE | Noted: 2023-10-07

## 2023-10-07 PROBLEM — Z95.5 S/P DRUG ELUTING CORONARY STENT PLACEMENT: Status: ACTIVE | Noted: 2023-10-07

## 2023-10-10 ENCOUNTER — OFFICE VISIT (OUTPATIENT)
Age: 73
End: 2023-10-10
Payer: MEDICARE

## 2023-10-10 VITALS
HEIGHT: 70 IN | BODY MASS INDEX: 31.78 KG/M2 | DIASTOLIC BLOOD PRESSURE: 76 MMHG | OXYGEN SATURATION: 97 % | WEIGHT: 222 LBS | HEART RATE: 79 BPM | SYSTOLIC BLOOD PRESSURE: 108 MMHG

## 2023-10-10 DIAGNOSIS — I42.8 NONISCHEMIC CARDIOMYOPATHY (HCC): ICD-10-CM

## 2023-10-10 DIAGNOSIS — I50.22 CHRONIC SYSTOLIC (CONGESTIVE) HEART FAILURE (HCC): ICD-10-CM

## 2023-10-10 DIAGNOSIS — I21.9 MYOCARDIAL INFARCTION, UNSPECIFIED MI TYPE, UNSPECIFIED ARTERY (HCC): ICD-10-CM

## 2023-10-10 DIAGNOSIS — I21.11 ACUTE ST ELEVATION MYOCARDIAL INFARCTION (STEMI) INVOLVING RIGHT CORONARY ARTERY (HCC): Primary | ICD-10-CM

## 2023-10-10 DIAGNOSIS — I45.4 NONSPECIFIC INTRAVENTRICULAR BLOCK: ICD-10-CM

## 2023-10-10 DIAGNOSIS — I48.11 LONGSTANDING PERSISTENT ATRIAL FIBRILLATION (HCC): ICD-10-CM

## 2023-10-10 DIAGNOSIS — F10.11 ALCOHOL ABUSE, IN REMISSION: ICD-10-CM

## 2023-10-10 PROCEDURE — G8427 DOCREV CUR MEDS BY ELIG CLIN: HCPCS | Performed by: INTERNAL MEDICINE

## 2023-10-10 PROCEDURE — 1111F DSCHRG MED/CURRENT MED MERGE: CPT | Performed by: INTERNAL MEDICINE

## 2023-10-10 PROCEDURE — G8484 FLU IMMUNIZE NO ADMIN: HCPCS | Performed by: INTERNAL MEDICINE

## 2023-10-10 PROCEDURE — G8417 CALC BMI ABV UP PARAM F/U: HCPCS | Performed by: INTERNAL MEDICINE

## 2023-10-10 PROCEDURE — 99215 OFFICE O/P EST HI 40 MIN: CPT | Performed by: INTERNAL MEDICINE

## 2023-10-10 PROCEDURE — 3017F COLORECTAL CA SCREEN DOC REV: CPT | Performed by: INTERNAL MEDICINE

## 2023-10-10 PROCEDURE — 93000 ELECTROCARDIOGRAM COMPLETE: CPT | Performed by: INTERNAL MEDICINE

## 2023-10-10 PROCEDURE — 1123F ACP DISCUSS/DSCN MKR DOCD: CPT | Performed by: INTERNAL MEDICINE

## 2023-10-10 PROCEDURE — 1036F TOBACCO NON-USER: CPT | Performed by: INTERNAL MEDICINE

## 2023-10-10 RX ORDER — ROSUVASTATIN CALCIUM 20 MG/1
20 TABLET, COATED ORAL NIGHTLY
Qty: 30 TABLET | Refills: 5 | Status: SHIPPED | OUTPATIENT
Start: 2023-10-10

## 2023-10-10 ASSESSMENT — PATIENT HEALTH QUESTIONNAIRE - PHQ9
1. LITTLE INTEREST OR PLEASURE IN DOING THINGS: 0
SUM OF ALL RESPONSES TO PHQ QUESTIONS 1-9: 0
2. FEELING DOWN, DEPRESSED OR HOPELESS: 0
SUM OF ALL RESPONSES TO PHQ9 QUESTIONS 1 & 2: 0
SUM OF ALL RESPONSES TO PHQ QUESTIONS 1-9: 0

## 2023-10-10 ASSESSMENT — ENCOUNTER SYMPTOMS
ABDOMINAL DISTENTION: 0
COUGH: 0
NAUSEA: 0
ABDOMINAL PAIN: 0
SHORTNESS OF BREATH: 0
VOMITING: 0
SORE THROAT: 0

## 2023-10-10 NOTE — PROGRESS NOTES
10/10/23     Tresa Turpin  is a 68 y.o. male     Chief Complaint   Patient presents with    Follow-Up from [facility] f/u 10/5-10/7 due to STEMI. Pt has no concerns        HPI  Patient presents for an add-on post hospital follow-up office visit. He was initially hospitalized in 2020 with new onset atrial fibrillation and rapid ventricular response and was found to have a severe dilated nonischemic cardiomyopathy with ejection fraction of less than 15%. He did have some mild heart failure symptoms during that admission. An echocardiogram demonstrated a mildly dilated left ventricle, EF less than 15%, mild aortic regurgitation mild aortic root dilatation and biatrial enlargement. He also underwent a cardiac catheterization during that admission which showed single-vessel disease of his ramus intermedius branch with a dilated cardiomyopathy out of proportion to his degree of coronary disease, thus a diagnosis of a dilated nonischemic cardiomyopathy. The patient does have a history of non-Hodgkin's lymphoma receiving CHOP chemotherapy 6 to 7 years ago. He reports that he recently started drinking heavily in 2020 after his wife  after battling cancer. He was drinking almost 750 cc of vodka daily. Following hospital discharge in October he states he briefly quit drinking but unfortunately started back up and unfortunately was readmitted to the hospital at the beginning of 2020 for acute on chronic systolic heart failure and rapid atrial fibrillation. He was diuresed, his medications were adjusted and he was discharged home. The patient recently underwent a follow-up echocardiogram in 2021 after being on appropriate medical therapy for 90 days. His ejection fraction remains severely depressed, EF 15-20% with global hypokinesis. At last visit he decided not to pursue an AICD.   He has since stopped taking his Entresto altogether due to severe hypotension

## 2023-10-10 NOTE — PROGRESS NOTES
Baylee Everette presents today for   Chief Complaint   Patient presents with    Follow-Up from [facility] f/u 10/5-10/7 due to STEMI. Pt has no concerns        Av Carnes Sr. preferred language for health care discussion is english/other. Is someone accompanying this pt? no    Is the patient using any DME equipment during OV? no    Depression Screening:  Depression: Not at risk (10/10/2023)    PHQ-2     PHQ-2 Score: 0        Learning Assessment:  Who is the primary learner? Patient    What is the preferred language for health care of the primary learner? ENGLISH    How does the primary learner prefer to learn new concepts? DEMONSTRATION    Answered By PATIENT    Relationship to Learner SELF           Pt currently taking Anticoagulant therapy? ELIQUIS 5 MG 2X DAILY     Pt currently taking Antiplatelet therapy ? ASA 81 MG 1X DAILY AND PLAVIX 75 MG 1X DAILY       Coordination of Care:  1. Have you been to the ER, urgent care clinic since your last visit? Hospitalized since your last visit? no    2. Have you seen or consulted any other health care providers outside of the [address] [place] [address] since your last visit? Include any pap smears or colon screening.  no

## 2023-11-06 RX ORDER — CARVEDILOL 6.25 MG/1
6.25 TABLET ORAL EVERY 12 HOURS
Qty: 180 TABLET | Refills: 3 | Status: SHIPPED | OUTPATIENT
Start: 2023-11-06

## 2023-11-06 RX ORDER — SPIRONOLACTONE 25 MG/1
12.5 TABLET ORAL DAILY
Qty: 45 TABLET | Refills: 3 | Status: SHIPPED | OUTPATIENT
Start: 2023-11-06

## 2023-11-07 RX ORDER — ROSUVASTATIN CALCIUM 20 MG/1
20 TABLET, COATED ORAL NIGHTLY
Qty: 90 TABLET | Refills: 3 | Status: SHIPPED | OUTPATIENT
Start: 2023-11-07

## 2023-12-03 ENCOUNTER — APPOINTMENT (OUTPATIENT)
Facility: HOSPITAL | Age: 73
End: 2023-12-03
Payer: MEDICARE

## 2023-12-03 ENCOUNTER — HOSPITAL ENCOUNTER (EMERGENCY)
Facility: HOSPITAL | Age: 73
Discharge: HOME | End: 2023-12-03
Payer: MEDICARE

## 2023-12-03 VITALS
RESPIRATION RATE: 12 BRPM | OXYGEN SATURATION: 100 % | HEART RATE: 72 BPM | DIASTOLIC BLOOD PRESSURE: 93 MMHG | TEMPERATURE: 97.9 F | SYSTOLIC BLOOD PRESSURE: 115 MMHG

## 2023-12-03 DIAGNOSIS — R07.9 CHEST PAIN, UNSPECIFIED TYPE: Primary | ICD-10-CM

## 2023-12-03 LAB
ALBUMIN SERPL-MCNC: 3.5 G/DL (ref 3.4–5)
ALBUMIN/GLOB SERPL: 1.1 (ref 0.8–1.7)
ALP SERPL-CCNC: 92 U/L (ref 45–117)
ALT SERPL-CCNC: 35 U/L (ref 16–61)
ANION GAP SERPL CALC-SCNC: 6 MMOL/L (ref 3–18)
APPEARANCE UR: CLEAR
AST SERPL-CCNC: 41 U/L (ref 10–38)
BASOPHILS # BLD: 0.1 K/UL (ref 0–0.1)
BASOPHILS NFR BLD: 1 % (ref 0–2)
BILIRUB SERPL-MCNC: 0.6 MG/DL (ref 0.2–1)
BILIRUB UR QL: NEGATIVE
BUN SERPL-MCNC: 19 MG/DL (ref 7–18)
BUN/CREAT SERPL: 15 (ref 12–20)
CALCIUM SERPL-MCNC: 9.1 MG/DL (ref 8.5–10.1)
CHLORIDE SERPL-SCNC: 109 MMOL/L (ref 100–111)
CO2 SERPL-SCNC: 25 MMOL/L (ref 21–32)
COLOR UR: YELLOW
CREAT SERPL-MCNC: 1.31 MG/DL (ref 0.6–1.3)
DIFFERENTIAL METHOD BLD: NORMAL
EOSINOPHIL # BLD: 0.3 K/UL (ref 0–0.4)
EOSINOPHIL NFR BLD: 4 % (ref 0–5)
ERYTHROCYTE [DISTWIDTH] IN BLOOD BY AUTOMATED COUNT: 14.1 % (ref 11.6–14.5)
GLOBULIN SER CALC-MCNC: 3.3 G/DL (ref 2–4)
GLUCOSE SERPL-MCNC: 145 MG/DL (ref 74–99)
GLUCOSE UR STRIP.AUTO-MCNC: NEGATIVE MG/DL
HCT VFR BLD AUTO: 42.3 % (ref 36–48)
HGB BLD-MCNC: 14.8 G/DL (ref 13–16)
HGB UR QL STRIP: NEGATIVE
IMM GRANULOCYTES # BLD AUTO: 0 K/UL (ref 0–0.04)
IMM GRANULOCYTES NFR BLD AUTO: 0 % (ref 0–0.5)
KETONES UR QL STRIP.AUTO: ABNORMAL MG/DL
LEUKOCYTE ESTERASE UR QL STRIP.AUTO: NEGATIVE
LIPASE SERPL-CCNC: 36 U/L (ref 13–75)
LYMPHOCYTES # BLD: 3.2 K/UL (ref 0.9–3.6)
LYMPHOCYTES NFR BLD: 35 % (ref 21–52)
MCH RBC QN AUTO: 32.4 PG (ref 24–34)
MCHC RBC AUTO-ENTMCNC: 35 G/DL (ref 31–37)
MCV RBC AUTO: 92.6 FL (ref 78–100)
MONOCYTES # BLD: 0.8 K/UL (ref 0.05–1.2)
MONOCYTES NFR BLD: 9 % (ref 3–10)
NEUTS SEG # BLD: 4.6 K/UL (ref 1.8–8)
NEUTS SEG NFR BLD: 51 % (ref 40–73)
NITRITE UR QL STRIP.AUTO: NEGATIVE
NRBC # BLD: 0 K/UL (ref 0–0.01)
NRBC BLD-RTO: 0 PER 100 WBC
PH UR STRIP: 6.5 (ref 5–8)
PLATELET # BLD AUTO: 261 K/UL (ref 135–420)
PMV BLD AUTO: 10.5 FL (ref 9.2–11.8)
POTASSIUM SERPL-SCNC: 4.4 MMOL/L (ref 3.5–5.5)
PROT SERPL-MCNC: 6.8 G/DL (ref 6.4–8.2)
PROT UR STRIP-MCNC: NEGATIVE MG/DL
RBC # BLD AUTO: 4.57 M/UL (ref 4.35–5.65)
SODIUM SERPL-SCNC: 140 MMOL/L (ref 136–145)
SP GR UR REFRACTOMETRY: 1.02 (ref 1–1.03)
TROPONIN I SERPL HS-MCNC: 13 NG/L (ref 0–78)
TROPONIN I SERPL HS-MCNC: 14 NG/L (ref 0–78)
UROBILINOGEN UR QL STRIP.AUTO: 1 EU/DL (ref 0.2–1)
WBC # BLD AUTO: 9.1 K/UL (ref 4.6–13.2)

## 2023-12-03 PROCEDURE — 80053 COMPREHEN METABOLIC PANEL: CPT

## 2023-12-03 PROCEDURE — 83690 ASSAY OF LIPASE: CPT

## 2023-12-03 PROCEDURE — 85025 COMPLETE CBC W/AUTO DIFF WBC: CPT

## 2023-12-03 PROCEDURE — 99285 EMERGENCY DEPT VISIT HI MDM: CPT

## 2023-12-03 PROCEDURE — 84484 ASSAY OF TROPONIN QUANT: CPT

## 2023-12-03 PROCEDURE — 71045 X-RAY EXAM CHEST 1 VIEW: CPT

## 2023-12-03 PROCEDURE — 93005 ELECTROCARDIOGRAM TRACING: CPT | Performed by: EMERGENCY MEDICINE

## 2023-12-03 PROCEDURE — 81003 URINALYSIS AUTO W/O SCOPE: CPT

## 2023-12-03 ASSESSMENT — ENCOUNTER SYMPTOMS
SHORTNESS OF BREATH: 0
ABDOMINAL PAIN: 1

## 2023-12-03 ASSESSMENT — HEART SCORE: ECG: 0

## 2023-12-03 NOTE — ED TRIAGE NOTES
Patient reports chest/abd pain 10/10. Patient reported to be diaphoretic and cold. Patient reports cardiac (STEMI, CHF, HF) and GI history. Patient reports 1/10 now.

## 2023-12-03 NOTE — ED NOTES
PATIENT REVIEWED AND DISCHARGE FOR HOME, PERIPHERAL IV ACCESS REMOVED, DISCHARGE PAPERS GIVEN AND EXPLAINED, ALL QUESTIONS AND CONCERN CLARIFIED.  PATIENT LEFT DEPT FOR HOME IN NIL DISTRESS      Germaine Concepcion RN  12/03/23 [id number]

## 2023-12-03 NOTE — ED NOTES
PATIENT ATTACHED TO MONITORS, EKG DONE, BLOOD SENT TO THE LAB.       Marcela Hastings RN  12/03/23 [id number]

## 2023-12-03 NOTE — ED PROVIDER NOTES
EMERGENCY DEPARTMENT HISTORY AND PHYSICAL EXAM    5:33 PM      Date: 12/3/2023  Patient Name: Tresa Campos    History of Presenting Illness     Chief Complaint   Patient presents with    Abdominal Pain           Chest Pain         History Provided By: Patient and medical chart review. Additional History (Context): Tresa Campos is a 68 y.o. male with   Past Medical History:   Diagnosis Date    Atrial fibrillation ([address]) 10/2020    Gastrointestinal disorder     H/O cardiac catheterization 10/2020    Ramus intermedius with 90% lesion, otherwise clean coronary arteries. LVEDP 18 mmHg    Heart failure (HCC)     Hep C w/o coma, chronic ([address]) 8-10-13    Hyperlipidemia     Liver disease current    non-hodgkins lymphoma    NHL (nodular histiocytic lymphoma) (HCC)     Nonischemic cardiomyopathy ([address]) 12/2020    EF less than 15%   who presents with complaints of abdominal pain that went into his chest.  He states it started at 1230pm after he ate some spaghetti. States the pain was 10 out of 10 and caused him to get very sweaty. States he took 2 nitro pain resolved and presents for evaluation. Rates pain 0/10. Denies any chest pain at this time. Patient denies any fever or chills, dizziness, dyspnea, pleuritic or exertional symptoms, orthopnea, nausea or vomiting, leg edema. family at the bedside. PCP: Giovany Torres MD    No current facility-administered medications for this encounter. Current Outpatient Medications   Medication Sig Dispense Refill    rosuvastatin (CRESTOR) 20 MG tablet Take 1 tablet by mouth nightly 90 tablet 3    carvedilol (COREG) 6.25 MG tablet Take 1 tablet by mouth in the morning and 1 tablet in the evening. 180 tablet 3    spironolactone (ALDACTONE) 25 MG tablet Take 0.5 tablets by mouth daily 45 tablet 3    aspirin 81 MG chewable tablet Take 1 tablet by mouth daily Take daily for 4 weeks, then stop.  30 tablet 0    atorvastatin (LIPITOR) 40 MG tablet Take 1 tablet by

## 2023-12-04 LAB
EKG ATRIAL RATE: 105 BPM
EKG DIAGNOSIS: NORMAL
EKG Q-T INTERVAL: 402 MS
EKG QRS DURATION: 122 MS
EKG QTC CALCULATION (BAZETT): 454 MS
EKG R AXIS: -60 DEGREES
EKG T AXIS: 92 DEGREES
EKG VENTRICULAR RATE: 77 BPM

## 2023-12-04 PROCEDURE — 93010 ELECTROCARDIOGRAM REPORT: CPT | Performed by: INTERNAL MEDICINE

## 2023-12-07 RX ORDER — CLOPIDOGREL BISULFATE 75 MG/1
75 TABLET ORAL DAILY
Qty: 90 TABLET | Refills: 3 | Status: SHIPPED | OUTPATIENT
Start: 2023-12-07

## 2023-12-07 RX ORDER — SPIRONOLACTONE 25 MG/1
12.5 TABLET ORAL DAILY
Qty: 45 TABLET | Refills: 3 | Status: SHIPPED | OUTPATIENT
Start: 2023-12-07

## 2023-12-07 RX ORDER — CARVEDILOL 6.25 MG/1
6.25 TABLET ORAL EVERY 12 HOURS
Qty: 180 TABLET | Refills: 3 | Status: SHIPPED | OUTPATIENT
Start: 2023-12-07

## 2024-01-11 ENCOUNTER — OFFICE VISIT (OUTPATIENT)
Age: 74
End: 2024-01-11

## 2024-01-11 VITALS
SYSTOLIC BLOOD PRESSURE: 130 MMHG | DIASTOLIC BLOOD PRESSURE: 88 MMHG | HEIGHT: 70 IN | OXYGEN SATURATION: 97 % | BODY MASS INDEX: 32.07 KG/M2 | HEART RATE: 74 BPM | WEIGHT: 224 LBS

## 2024-01-11 DIAGNOSIS — I45.4 NONSPECIFIC INTRAVENTRICULAR BLOCK: ICD-10-CM

## 2024-01-11 DIAGNOSIS — I50.22 CHRONIC SYSTOLIC (CONGESTIVE) HEART FAILURE (HCC): ICD-10-CM

## 2024-01-11 DIAGNOSIS — I21.9 MYOCARDIAL INFARCTION, UNSPECIFIED MI TYPE, UNSPECIFIED ARTERY (HCC): ICD-10-CM

## 2024-01-11 DIAGNOSIS — F10.11 ALCOHOL ABUSE, IN REMISSION: ICD-10-CM

## 2024-01-11 DIAGNOSIS — I42.8 NONISCHEMIC CARDIOMYOPATHY (HCC): Primary | ICD-10-CM

## 2024-01-11 DIAGNOSIS — I48.11 LONGSTANDING PERSISTENT ATRIAL FIBRILLATION (HCC): ICD-10-CM

## 2024-01-11 PROBLEM — Z90.81 HISTORY OF SPLENECTOMY: Status: ACTIVE | Noted: 2024-01-11

## 2024-01-11 PROBLEM — J10.1 INFLUENZA DUE TO OTHER IDENTIFIED INFLUENZA VIRUS WITH OTHER RESPIRATORY MANIFESTATIONS: Status: ACTIVE | Noted: 2024-01-11

## 2024-01-11 PROBLEM — C83.30 LYMPHOMA, LARGE-CELL, DIFFUSE (HCC): Status: ACTIVE | Noted: 2024-01-11

## 2024-01-11 RX ORDER — CLOPIDOGREL BISULFATE 75 MG/1
75 TABLET ORAL DAILY
COMMUNITY

## 2024-01-11 ASSESSMENT — ANXIETY QUESTIONNAIRES
5. BEING SO RESTLESS THAT IT IS HARD TO SIT STILL: 0
1. FEELING NERVOUS, ANXIOUS, OR ON EDGE: 0
4. TROUBLE RELAXING: 0
2. NOT BEING ABLE TO STOP OR CONTROL WORRYING: 0
6. BECOMING EASILY ANNOYED OR IRRITABLE: 0
7. FEELING AFRAID AS IF SOMETHING AWFUL MIGHT HAPPEN: 0
GAD7 TOTAL SCORE: 0
3. WORRYING TOO MUCH ABOUT DIFFERENT THINGS: 0

## 2024-01-11 ASSESSMENT — ENCOUNTER SYMPTOMS
COUGH: 0
NAUSEA: 0
VOMITING: 0
ABDOMINAL DISTENTION: 0
SORE THROAT: 0
ABDOMINAL PAIN: 0
SHORTNESS OF BREATH: 1

## 2024-01-11 ASSESSMENT — PATIENT HEALTH QUESTIONNAIRE - PHQ9
2. FEELING DOWN, DEPRESSED OR HOPELESS: 0
SUM OF ALL RESPONSES TO PHQ9 QUESTIONS 1 & 2: 0
SUM OF ALL RESPONSES TO PHQ QUESTIONS 1-9: 0
1. LITTLE INTEREST OR PLEASURE IN DOING THINGS: 0
SUM OF ALL RESPONSES TO PHQ QUESTIONS 1-9: 0

## 2024-01-11 NOTE — PROGRESS NOTES
Reji Dunlap Sr. presents today for   Chief Complaint   Patient presents with    Follow-up     3 month       Reji Dunlap Sr. preferred language for health care discussion is english/other.    Is someone accompanying this pt? no    Is the patient using any DME equipment during OV? no    Depression Screening:  Depression: Not at risk (1/11/2024)    PHQ-2     PHQ-2 Score: 0        Learning Assessment:  Who is the primary learner? Patient    What is the preferred language for health care of the primary learner? ENGLISH    How does the primary learner prefer to learn new concepts? DEMONSTRATION    Answered By patient    Relationship to Learner SELF           Pt currently taking Anticoagulant therapy? Eliquis 5 mg bid    Pt currently taking Antiplatelet therapy ? Plavix 75 mg daily      Coordination of Care:  1. Have you been to the ER, urgent care clinic since your last visit? Hospitalized since your last visit? no    2. Have you seen or consulted any other health care providers outside of the [facility] since your last visit? Include any pap smears or colon screening. no    
lymphoma) (HCC)     Nonischemic cardiomyopathy (HCC) 12/2020    EF less than 15%     Current Outpatient Medications   Medication Sig Dispense Refill    clopidogrel (PLAVIX) 75 MG tablet Take 1 tablet by mouth daily      spironolactone (ALDACTONE) 25 MG tablet TAKE 1/2 TABLET BY MOUTH DAILY 45 tablet 3    carvedilol (COREG) 6.25 MG tablet Take 1 tablet by mouth in the morning and 1 tablet in the evening. 180 tablet 3    nitroGLYCERIN (NITROSTAT) 0.4 MG SL tablet Place 1 tablet under the tongue every 5 minutes as needed for Chest pain up to max of 3 total doses. If no relief after 1 dose, call 911. 25 tablet 1    digoxin (LANOXIN) 125 MCG tablet TAKE 1 TABLET BY MOUTH DAILY 90 tablet 3    apixaban (ELIQUIS) 5 MG TABS tablet Take 1 tablet by mouth 2 times daily       No current facility-administered medications for this visit.     Allergies   Allergen Reactions    Codeine Other (See Comments)     Pain in his abdominal area.    Acetaminophen Other (See Comments)     Patient states cannot have acetaminophen due to hep C    Atorvastatin Other (See Comments)    Lidocaine Palpitations     Social History     Tobacco Use    Smoking status: Former     Current packs/day: 0.00     Types: Cigarettes     Quit date: 10/9/2013     Years since quitting: 10.2    Smokeless tobacco: Never   Vaping Use    Vaping Use: Never used   Substance Use Topics    Alcohol use: Yes    Drug use: No     Family History   Problem Relation Age of Onset    Cancer Mother 75        lung cancer    Stroke Mother 75    Diabetes Mother 72    Heart Attack Father 74        cause of death    No Known Problems Sister     No Known Problems Brother     No Known Problems Maternal Aunt     No Known Problems Maternal Uncle     No Known Problems Paternal Aunt     No Known Problems Paternal Uncle     No Known Problems Maternal Grandmother     No Known Problems Maternal Grandfather     No Known Problems Paternal Grandmother     Heart Attack Paternal Grandfather 65    No

## 2024-02-21 PROBLEM — I21.11 ACUTE ST ELEVATION MYOCARDIAL INFARCTION (STEMI) INVOLVING RIGHT CORONARY ARTERY (HCC): Status: RESOLVED | Noted: 2023-10-10 | Resolved: 2024-02-21

## 2024-02-21 PROBLEM — I25.2 HISTORY OF ACUTE MYOCARDIAL INFARCTION: Status: ACTIVE | Noted: 2023-10-05

## 2024-04-18 ENCOUNTER — HOSPITAL ENCOUNTER (INPATIENT)
Facility: HOSPITAL | Age: 74
LOS: 2 days | Discharge: HOME | DRG: 439 | End: 2024-04-21
Attending: EMERGENCY MEDICINE | Admitting: INTERNAL MEDICINE
Payer: MEDICARE

## 2024-04-18 ENCOUNTER — APPOINTMENT (OUTPATIENT)
Facility: HOSPITAL | Age: 74
DRG: 439 | End: 2024-04-18
Payer: MEDICARE

## 2024-04-18 DIAGNOSIS — I48.11 LONGSTANDING PERSISTENT ATRIAL FIBRILLATION (HCC): ICD-10-CM

## 2024-04-18 DIAGNOSIS — K85.10 ACUTE GALLSTONE PANCREATITIS: ICD-10-CM

## 2024-04-18 DIAGNOSIS — K85.80 OTHER ACUTE PANCREATITIS WITHOUT INFECTION OR NECROSIS: ICD-10-CM

## 2024-04-18 DIAGNOSIS — I50.9 ACUTE ON CHRONIC CONGESTIVE HEART FAILURE, UNSPECIFIED HEART FAILURE TYPE (HCC): Primary | ICD-10-CM

## 2024-04-18 LAB
ALBUMIN SERPL-MCNC: 3.5 G/DL (ref 3.4–5)
ALBUMIN/GLOB SERPL: 0.9 (ref 0.8–1.7)
ALP SERPL-CCNC: 117 U/L (ref 45–117)
ALT SERPL-CCNC: 57 U/L (ref 16–61)
ANION GAP SERPL CALC-SCNC: 3 MMOL/L (ref 3–18)
APPEARANCE UR: CLEAR
AST SERPL-CCNC: 77 U/L (ref 10–38)
BASOPHILS # BLD: 0.1 K/UL (ref 0–0.1)
BASOPHILS NFR BLD: 1 % (ref 0–2)
BILIRUB SERPL-MCNC: 0.5 MG/DL (ref 0.2–1)
BILIRUB UR QL: NEGATIVE
BUN SERPL-MCNC: 20 MG/DL (ref 7–18)
BUN/CREAT SERPL: 17 (ref 12–20)
CALCIUM SERPL-MCNC: 9 MG/DL (ref 8.5–10.1)
CHLORIDE SERPL-SCNC: 109 MMOL/L (ref 100–111)
CO2 SERPL-SCNC: 28 MMOL/L (ref 21–32)
COLOR UR: YELLOW
CREAT SERPL-MCNC: 1.18 MG/DL (ref 0.6–1.3)
DIFFERENTIAL METHOD BLD: ABNORMAL
EOSINOPHIL # BLD: 0.7 K/UL (ref 0–0.4)
EOSINOPHIL NFR BLD: 6 % (ref 0–5)
ERYTHROCYTE [DISTWIDTH] IN BLOOD BY AUTOMATED COUNT: 14.6 % (ref 11.6–14.5)
GLOBULIN SER CALC-MCNC: 4 G/DL (ref 2–4)
GLUCOSE SERPL-MCNC: 164 MG/DL (ref 74–99)
GLUCOSE UR STRIP.AUTO-MCNC: NEGATIVE MG/DL
HCT VFR BLD AUTO: 43.9 % (ref 36–48)
HGB BLD-MCNC: 14.6 G/DL (ref 13–16)
HGB UR QL STRIP: NEGATIVE
IMM GRANULOCYTES # BLD AUTO: 0 K/UL (ref 0–0.04)
IMM GRANULOCYTES NFR BLD AUTO: 0 % (ref 0–0.5)
KETONES UR QL STRIP.AUTO: NEGATIVE MG/DL
LEUKOCYTE ESTERASE UR QL STRIP.AUTO: NEGATIVE
LIPASE SERPL-CCNC: >5000 U/L (ref 13–75)
LYMPHOCYTES # BLD: 3.8 K/UL (ref 0.9–3.6)
LYMPHOCYTES NFR BLD: 30 % (ref 21–52)
MCH RBC QN AUTO: 31.5 PG (ref 24–34)
MCHC RBC AUTO-ENTMCNC: 33.3 G/DL (ref 31–37)
MCV RBC AUTO: 94.6 FL (ref 78–100)
MONOCYTES # BLD: 0.5 K/UL (ref 0.05–1.2)
MONOCYTES NFR BLD: 4 % (ref 3–10)
NEUTS SEG # BLD: 7.6 K/UL (ref 1.8–8)
NEUTS SEG NFR BLD: 60 % (ref 40–73)
NITRITE UR QL STRIP.AUTO: NEGATIVE
NRBC # BLD: 0 K/UL (ref 0–0.01)
NRBC BLD-RTO: 0 PER 100 WBC
PH UR STRIP: 5.5 (ref 5–8)
PLATELET # BLD AUTO: 270 K/UL (ref 135–420)
PMV BLD AUTO: 10.8 FL (ref 9.2–11.8)
POTASSIUM SERPL-SCNC: 3.5 MMOL/L (ref 3.5–5.5)
PROT SERPL-MCNC: 7.5 G/DL (ref 6.4–8.2)
PROT UR STRIP-MCNC: NEGATIVE MG/DL
RBC # BLD AUTO: 4.64 M/UL (ref 4.35–5.65)
SODIUM SERPL-SCNC: 140 MMOL/L (ref 136–145)
SP GR UR REFRACTOMETRY: 1.02 (ref 1–1.03)
UROBILINOGEN UR QL STRIP.AUTO: 1 EU/DL (ref 0.2–1)
WBC # BLD AUTO: 12.7 K/UL (ref 4.6–13.2)

## 2024-04-18 PROCEDURE — 80053 COMPREHEN METABOLIC PANEL: CPT

## 2024-04-18 PROCEDURE — 99285 EMERGENCY DEPT VISIT HI MDM: CPT

## 2024-04-18 PROCEDURE — 2580000003 HC RX 258: Performed by: EMERGENCY MEDICINE

## 2024-04-18 PROCEDURE — 83690 ASSAY OF LIPASE: CPT

## 2024-04-18 PROCEDURE — 85025 COMPLETE CBC W/AUTO DIFF WBC: CPT

## 2024-04-18 PROCEDURE — 96375 TX/PRO/DX INJ NEW DRUG ADDON: CPT

## 2024-04-18 PROCEDURE — 74177 CT ABD & PELVIS W/CONTRAST: CPT

## 2024-04-18 PROCEDURE — 96361 HYDRATE IV INFUSION ADD-ON: CPT

## 2024-04-18 PROCEDURE — 81003 URINALYSIS AUTO W/O SCOPE: CPT

## 2024-04-18 PROCEDURE — 6360000002 HC RX W HCPCS: Performed by: EMERGENCY MEDICINE

## 2024-04-18 RX ORDER — SODIUM CHLORIDE, SODIUM LACTATE, POTASSIUM CHLORIDE, AND CALCIUM CHLORIDE .6; .31; .03; .02 G/100ML; G/100ML; G/100ML; G/100ML
1000 INJECTION, SOLUTION INTRAVENOUS ONCE
Status: COMPLETED | OUTPATIENT
Start: 2024-04-18 | End: 2024-04-19

## 2024-04-18 RX ORDER — ONDANSETRON 2 MG/ML
4 INJECTION INTRAMUSCULAR; INTRAVENOUS
Status: COMPLETED | OUTPATIENT
Start: 2024-04-18 | End: 2024-04-18

## 2024-04-18 RX ORDER — MORPHINE SULFATE 4 MG/ML
4 INJECTION, SOLUTION INTRAMUSCULAR; INTRAVENOUS
Status: COMPLETED | OUTPATIENT
Start: 2024-04-18 | End: 2024-04-18

## 2024-04-18 RX ORDER — HYDROMORPHONE HYDROCHLORIDE 1 MG/ML
1 INJECTION, SOLUTION INTRAMUSCULAR; INTRAVENOUS; SUBCUTANEOUS ONCE
Status: COMPLETED | OUTPATIENT
Start: 2024-04-19 | End: 2024-04-18

## 2024-04-18 RX ADMIN — SODIUM CHLORIDE, POTASSIUM CHLORIDE, SODIUM LACTATE AND CALCIUM CHLORIDE 1000 ML: 600; 310; 30; 20 INJECTION, SOLUTION INTRAVENOUS at 23:00

## 2024-04-18 RX ADMIN — MORPHINE SULFATE 4 MG: 4 INJECTION, SOLUTION INTRAMUSCULAR; INTRAVENOUS at 23:03

## 2024-04-18 RX ADMIN — HYDROMORPHONE HYDROCHLORIDE 1 MG: 1 INJECTION, SOLUTION INTRAMUSCULAR; INTRAVENOUS; SUBCUTANEOUS at 23:53

## 2024-04-18 RX ADMIN — ONDANSETRON 4 MG: 2 INJECTION INTRAMUSCULAR; INTRAVENOUS at 23:01

## 2024-04-18 ASSESSMENT — PAIN SCALES - GENERAL
PAINLEVEL_OUTOF10: 10
PAINLEVEL_OUTOF10: 10

## 2024-04-18 ASSESSMENT — PAIN DESCRIPTION - LOCATION
LOCATION: ABDOMEN
LOCATION: ABDOMEN

## 2024-04-18 ASSESSMENT — PAIN DESCRIPTION - ORIENTATION
ORIENTATION: MID
ORIENTATION: MID

## 2024-04-19 ENCOUNTER — APPOINTMENT (OUTPATIENT)
Facility: HOSPITAL | Age: 74
DRG: 439 | End: 2024-04-19
Payer: MEDICARE

## 2024-04-19 PROBLEM — K85.90 ACUTE PANCREATITIS WITHOUT INFECTION OR NECROSIS: Status: ACTIVE | Noted: 2024-04-19

## 2024-04-19 PROBLEM — K85.10 ACUTE GALLSTONE PANCREATITIS: Status: ACTIVE | Noted: 2024-04-19

## 2024-04-19 LAB
ANION GAP SERPL CALC-SCNC: 5 MMOL/L (ref 3–18)
APTT PPP: 33.4 SEC (ref 23–36.4)
BUN SERPL-MCNC: 18 MG/DL (ref 7–18)
BUN/CREAT SERPL: 15 (ref 12–20)
CALCIUM SERPL-MCNC: 8.6 MG/DL (ref 8.5–10.1)
CHLORIDE SERPL-SCNC: 107 MMOL/L (ref 100–111)
CO2 SERPL-SCNC: 25 MMOL/L (ref 21–32)
CREAT SERPL-MCNC: 1.18 MG/DL (ref 0.6–1.3)
DIGOXIN SERPL-MCNC: 0.8 NG/ML (ref 0.9–2)
ERYTHROCYTE [DISTWIDTH] IN BLOOD BY AUTOMATED COUNT: 14.6 % (ref 11.6–14.5)
GLUCOSE SERPL-MCNC: 184 MG/DL (ref 74–99)
HCT VFR BLD AUTO: 44.8 % (ref 36–48)
HGB BLD-MCNC: 14.8 G/DL (ref 13–16)
MCH RBC QN AUTO: 30.8 PG (ref 24–34)
MCHC RBC AUTO-ENTMCNC: 33 G/DL (ref 31–37)
MCV RBC AUTO: 93.3 FL (ref 78–100)
NRBC # BLD: 0.02 K/UL (ref 0–0.01)
NRBC BLD-RTO: 0.1 PER 100 WBC
PLATELET # BLD AUTO: 256 K/UL (ref 135–420)
PMV BLD AUTO: 10.9 FL (ref 9.2–11.8)
POTASSIUM SERPL-SCNC: 3.9 MMOL/L (ref 3.5–5.5)
RBC # BLD AUTO: 4.8 M/UL (ref 4.35–5.65)
SODIUM SERPL-SCNC: 137 MMOL/L (ref 136–145)
TRIGL SERPL-MCNC: 80 MG/DL
WBC # BLD AUTO: 21.2 K/UL (ref 4.6–13.2)

## 2024-04-19 PROCEDURE — 96365 THER/PROPH/DIAG IV INF INIT: CPT

## 2024-04-19 PROCEDURE — 36415 COLL VENOUS BLD VENIPUNCTURE: CPT

## 2024-04-19 PROCEDURE — 2580000003 HC RX 258: Performed by: EMERGENCY MEDICINE

## 2024-04-19 PROCEDURE — 6360000004 HC RX CONTRAST MEDICATION: Performed by: EMERGENCY MEDICINE

## 2024-04-19 PROCEDURE — 85027 COMPLETE CBC AUTOMATED: CPT

## 2024-04-19 PROCEDURE — 94761 N-INVAS EAR/PLS OXIMETRY MLT: CPT

## 2024-04-19 PROCEDURE — 99223 1ST HOSP IP/OBS HIGH 75: CPT | Performed by: INTERNAL MEDICINE

## 2024-04-19 PROCEDURE — 6360000002 HC RX W HCPCS: Performed by: INTERNAL MEDICINE

## 2024-04-19 PROCEDURE — 74181 MRI ABDOMEN W/O CONTRAST: CPT

## 2024-04-19 PROCEDURE — 6360000002 HC RX W HCPCS

## 2024-04-19 PROCEDURE — 80162 ASSAY OF DIGOXIN TOTAL: CPT

## 2024-04-19 PROCEDURE — 6370000000 HC RX 637 (ALT 250 FOR IP): Performed by: INTERNAL MEDICINE

## 2024-04-19 PROCEDURE — 96361 HYDRATE IV INFUSION ADD-ON: CPT

## 2024-04-19 PROCEDURE — 99222 1ST HOSP IP/OBS MODERATE 55: CPT | Performed by: SURGERY

## 2024-04-19 PROCEDURE — 76705 ECHO EXAM OF ABDOMEN: CPT

## 2024-04-19 PROCEDURE — 84478 ASSAY OF TRIGLYCERIDES: CPT

## 2024-04-19 PROCEDURE — 6360000002 HC RX W HCPCS: Performed by: STUDENT IN AN ORGANIZED HEALTH CARE EDUCATION/TRAINING PROGRAM

## 2024-04-19 PROCEDURE — 1100000000 HC RM PRIVATE

## 2024-04-19 PROCEDURE — 2580000003 HC RX 258: Performed by: INTERNAL MEDICINE

## 2024-04-19 PROCEDURE — 96376 TX/PRO/DX INJ SAME DRUG ADON: CPT

## 2024-04-19 PROCEDURE — 85730 THROMBOPLASTIN TIME PARTIAL: CPT

## 2024-04-19 PROCEDURE — 6360000002 HC RX W HCPCS: Performed by: EMERGENCY MEDICINE

## 2024-04-19 PROCEDURE — 80048 BASIC METABOLIC PNL TOTAL CA: CPT

## 2024-04-19 RX ORDER — SODIUM CHLORIDE 9 MG/ML
INJECTION, SOLUTION INTRAVENOUS PRN
Status: DISCONTINUED | OUTPATIENT
Start: 2024-04-19 | End: 2024-04-21 | Stop reason: HOSPADM

## 2024-04-19 RX ORDER — POTASSIUM CHLORIDE 7.45 MG/ML
10 INJECTION INTRAVENOUS PRN
Status: DISCONTINUED | OUTPATIENT
Start: 2024-04-19 | End: 2024-04-21 | Stop reason: HOSPADM

## 2024-04-19 RX ORDER — HEPARIN SODIUM 1000 [USP'U]/ML
4000 INJECTION, SOLUTION INTRAVENOUS; SUBCUTANEOUS ONCE
Status: COMPLETED | OUTPATIENT
Start: 2024-04-19 | End: 2024-04-19

## 2024-04-19 RX ORDER — SODIUM CHLORIDE 0.9 % (FLUSH) 0.9 %
5-40 SYRINGE (ML) INJECTION PRN
Status: DISCONTINUED | OUTPATIENT
Start: 2024-04-19 | End: 2024-04-21 | Stop reason: HOSPADM

## 2024-04-19 RX ORDER — HEPARIN SODIUM 10000 [USP'U]/100ML
5-30 INJECTION, SOLUTION INTRAVENOUS CONTINUOUS
Status: DISCONTINUED | OUTPATIENT
Start: 2024-04-19 | End: 2024-04-21 | Stop reason: HOSPADM

## 2024-04-19 RX ORDER — HYDROMORPHONE HYDROCHLORIDE 1 MG/ML
1 INJECTION, SOLUTION INTRAMUSCULAR; INTRAVENOUS; SUBCUTANEOUS
Status: DISCONTINUED | OUTPATIENT
Start: 2024-04-19 | End: 2024-04-21 | Stop reason: HOSPADM

## 2024-04-19 RX ORDER — HYDROMORPHONE HYDROCHLORIDE 1 MG/ML
1 INJECTION, SOLUTION INTRAMUSCULAR; INTRAVENOUS; SUBCUTANEOUS ONCE
Status: COMPLETED | OUTPATIENT
Start: 2024-04-19 | End: 2024-04-19

## 2024-04-19 RX ORDER — POLYETHYLENE GLYCOL 3350 17 G/17G
17 POWDER, FOR SOLUTION ORAL DAILY PRN
Status: DISCONTINUED | OUTPATIENT
Start: 2024-04-19 | End: 2024-04-21 | Stop reason: HOSPADM

## 2024-04-19 RX ORDER — SODIUM CHLORIDE, SODIUM LACTATE, POTASSIUM CHLORIDE, CALCIUM CHLORIDE 600; 310; 30; 20 MG/100ML; MG/100ML; MG/100ML; MG/100ML
INJECTION, SOLUTION INTRAVENOUS CONTINUOUS
Status: DISPENSED | OUTPATIENT
Start: 2024-04-19 | End: 2024-04-19

## 2024-04-19 RX ORDER — ONDANSETRON 4 MG/1
4 TABLET, ORALLY DISINTEGRATING ORAL EVERY 8 HOURS PRN
Status: DISCONTINUED | OUTPATIENT
Start: 2024-04-19 | End: 2024-04-21 | Stop reason: HOSPADM

## 2024-04-19 RX ORDER — ONDANSETRON 2 MG/ML
4 INJECTION INTRAMUSCULAR; INTRAVENOUS EVERY 6 HOURS PRN
Status: DISCONTINUED | OUTPATIENT
Start: 2024-04-19 | End: 2024-04-21 | Stop reason: HOSPADM

## 2024-04-19 RX ORDER — SODIUM CHLORIDE, SODIUM LACTATE, POTASSIUM CHLORIDE, AND CALCIUM CHLORIDE .6; .31; .03; .02 G/100ML; G/100ML; G/100ML; G/100ML
2000 INJECTION, SOLUTION INTRAVENOUS ONCE
Status: COMPLETED | OUTPATIENT
Start: 2024-04-19 | End: 2024-04-19

## 2024-04-19 RX ORDER — DIGOXIN 125 MCG
0.12 TABLET ORAL DAILY
Status: DISCONTINUED | OUTPATIENT
Start: 2024-04-19 | End: 2024-04-21 | Stop reason: HOSPADM

## 2024-04-19 RX ORDER — CLOPIDOGREL BISULFATE 75 MG/1
75 TABLET ORAL DAILY
Status: DISCONTINUED | OUTPATIENT
Start: 2024-04-19 | End: 2024-04-21 | Stop reason: HOSPADM

## 2024-04-19 RX ORDER — SODIUM CHLORIDE 0.9 % (FLUSH) 0.9 %
5-40 SYRINGE (ML) INJECTION EVERY 12 HOURS SCHEDULED
Status: DISCONTINUED | OUTPATIENT
Start: 2024-04-19 | End: 2024-04-21 | Stop reason: HOSPADM

## 2024-04-19 RX ORDER — MAGNESIUM SULFATE IN WATER 40 MG/ML
2000 INJECTION, SOLUTION INTRAVENOUS PRN
Status: DISCONTINUED | OUTPATIENT
Start: 2024-04-19 | End: 2024-04-21 | Stop reason: HOSPADM

## 2024-04-19 RX ORDER — HEPARIN SODIUM 1000 [USP'U]/ML
4000 INJECTION, SOLUTION INTRAVENOUS; SUBCUTANEOUS PRN
Status: DISCONTINUED | OUTPATIENT
Start: 2024-04-19 | End: 2024-04-21 | Stop reason: HOSPADM

## 2024-04-19 RX ORDER — HEPARIN SODIUM 1000 [USP'U]/ML
2000 INJECTION, SOLUTION INTRAVENOUS; SUBCUTANEOUS PRN
Status: DISCONTINUED | OUTPATIENT
Start: 2024-04-19 | End: 2024-04-21 | Stop reason: HOSPADM

## 2024-04-19 RX ORDER — POTASSIUM CHLORIDE 20 MEQ/1
40 TABLET, EXTENDED RELEASE ORAL PRN
Status: DISCONTINUED | OUTPATIENT
Start: 2024-04-19 | End: 2024-04-21 | Stop reason: HOSPADM

## 2024-04-19 RX ORDER — CARVEDILOL 6.25 MG/1
6.25 TABLET ORAL EVERY 12 HOURS
Status: DISCONTINUED | OUTPATIENT
Start: 2024-04-19 | End: 2024-04-21

## 2024-04-19 RX ADMIN — HYDROMORPHONE HYDROCHLORIDE 1 MG: 1 INJECTION, SOLUTION INTRAMUSCULAR; INTRAVENOUS; SUBCUTANEOUS at 02:54

## 2024-04-19 RX ADMIN — ONDANSETRON 4 MG: 2 INJECTION INTRAMUSCULAR; INTRAVENOUS at 04:37

## 2024-04-19 RX ADMIN — HEPARIN SODIUM 4000 UNITS: 1000 INJECTION INTRAVENOUS; SUBCUTANEOUS at 16:29

## 2024-04-19 RX ADMIN — HYDROMORPHONE HYDROCHLORIDE 0.5 MG: 1 INJECTION, SOLUTION INTRAMUSCULAR; INTRAVENOUS; SUBCUTANEOUS at 20:35

## 2024-04-19 RX ADMIN — HYDROMORPHONE HYDROCHLORIDE 1 MG: 1 INJECTION, SOLUTION INTRAMUSCULAR; INTRAVENOUS; SUBCUTANEOUS at 16:19

## 2024-04-19 RX ADMIN — CARVEDILOL 6.25 MG: 6.25 TABLET, FILM COATED ORAL at 16:16

## 2024-04-19 RX ADMIN — IOPAMIDOL 90 ML: 612 INJECTION, SOLUTION INTRAVENOUS at 00:17

## 2024-04-19 RX ADMIN — CLOPIDOGREL BISULFATE 75 MG: 75 TABLET ORAL at 08:37

## 2024-04-19 RX ADMIN — PIPERACILLIN AND TAZOBACTAM 3375 MG: 3; .375 INJECTION, POWDER, FOR SOLUTION INTRAVENOUS at 02:57

## 2024-04-19 RX ADMIN — SODIUM CHLORIDE, POTASSIUM CHLORIDE, SODIUM LACTATE AND CALCIUM CHLORIDE: 600; 310; 30; 20 INJECTION, SOLUTION INTRAVENOUS at 08:48

## 2024-04-19 RX ADMIN — SODIUM CHLORIDE, POTASSIUM CHLORIDE, SODIUM LACTATE AND CALCIUM CHLORIDE 2000 ML: 600; 310; 30; 20 INJECTION, SOLUTION INTRAVENOUS at 02:53

## 2024-04-19 RX ADMIN — HYDROMORPHONE HYDROCHLORIDE 0.5 MG: 1 INJECTION, SOLUTION INTRAMUSCULAR; INTRAVENOUS; SUBCUTANEOUS at 08:37

## 2024-04-19 RX ADMIN — HYDROMORPHONE HYDROCHLORIDE 0.5 MG: 1 INJECTION, SOLUTION INTRAMUSCULAR; INTRAVENOUS; SUBCUTANEOUS at 04:38

## 2024-04-19 RX ADMIN — HEPARIN SODIUM 9 UNITS/KG/HR: 10000 INJECTION, SOLUTION INTRAVENOUS at 18:48

## 2024-04-19 RX ADMIN — HYDROMORPHONE HYDROCHLORIDE 1 MG: 1 INJECTION, SOLUTION INTRAMUSCULAR; INTRAVENOUS; SUBCUTANEOUS at 12:17

## 2024-04-19 RX ADMIN — DIGOXIN 0.12 MG: 125 TABLET ORAL at 08:37

## 2024-04-19 RX ADMIN — SODIUM CHLORIDE, PRESERVATIVE FREE 10 ML: 5 INJECTION INTRAVENOUS at 09:04

## 2024-04-19 ASSESSMENT — PAIN - FUNCTIONAL ASSESSMENT
PAIN_FUNCTIONAL_ASSESSMENT: ACTIVITIES ARE NOT PREVENTED

## 2024-04-19 ASSESSMENT — PAIN DESCRIPTION - FREQUENCY
FREQUENCY: INTERMITTENT
FREQUENCY: INTERMITTENT

## 2024-04-19 ASSESSMENT — PAIN DESCRIPTION - LOCATION
LOCATION: ABDOMEN

## 2024-04-19 ASSESSMENT — PAIN SCALES - GENERAL
PAINLEVEL_OUTOF10: 5
PAINLEVEL_OUTOF10: 5
PAINLEVEL_OUTOF10: 4
PAINLEVEL_OUTOF10: 3
PAINLEVEL_OUTOF10: 7
PAINLEVEL_OUTOF10: 3
PAINLEVEL_OUTOF10: 6
PAINLEVEL_OUTOF10: 2
PAINLEVEL_OUTOF10: 7
PAINLEVEL_OUTOF10: 0
PAINLEVEL_OUTOF10: 8
PAINLEVEL_OUTOF10: 5
PAINLEVEL_OUTOF10: 5
PAINLEVEL_OUTOF10: 4
PAINLEVEL_OUTOF10: 5

## 2024-04-19 ASSESSMENT — PAIN DESCRIPTION - ORIENTATION
ORIENTATION: MID
ORIENTATION: MID;LOWER
ORIENTATION: MID
ORIENTATION: MID
ORIENTATION: RIGHT;ANTERIOR;MID
ORIENTATION: MID

## 2024-04-19 ASSESSMENT — PAIN DESCRIPTION - DESCRIPTORS
DESCRIPTORS: DULL;SHARP
DESCRIPTORS: SQUEEZING
DESCRIPTORS: ACHING
DESCRIPTORS: STABBING
DESCRIPTORS: DULL;SHARP
DESCRIPTORS: DULL;STABBING

## 2024-04-19 ASSESSMENT — PAIN DESCRIPTION - PAIN TYPE: TYPE: ACUTE PAIN

## 2024-04-19 ASSESSMENT — PAIN DESCRIPTION - ONSET
ONSET: GRADUAL
ONSET: ON-GOING

## 2024-04-19 NOTE — ED PROVIDER NOTES
Total Bilirubin 0.5 0.2 - 1.0 MG/DL    ALT 57 16 - 61 U/L    AST 77 (H) 10 - 38 U/L    Alk Phosphatase 117 45 - 117 U/L    Total Protein 7.5 6.4 - 8.2 g/dL    Albumin 3.5 3.4 - 5.0 g/dL    Globulin 4.0 2.0 - 4.0 g/dL    Albumin/Globulin Ratio 0.9 0.8 - 1.7     Lipase    Collection Time: 04/18/24 10:34 PM   Result Value Ref Range    Lipase >5,000 (H) 13 - 75 U/L   Urinalysis    Collection Time: 04/18/24 11:18 PM   Result Value Ref Range    Color, UA YELLOW      Appearance CLEAR      Specific Gravity, UA 1.022 1.005 - 1.030      pH, Urine 5.5 5.0 - 8.0      Protein, UA Negative NEG mg/dL    Glucose, UA Negative NEG mg/dL    Ketones, Urine Negative NEG mg/dL    Bilirubin Urine Negative NEG      Blood, Urine Negative NEG      Urobilinogen, Urine 1.0 0.2 - 1.0 EU/dL    Nitrite, Urine Negative NEG      Leukocyte Esterase, Urine Negative NEG         Radiologic Studies:   CT ABDOMEN PELVIS W IV CONTRAST Additional Contrast? None   Final Result   Severe acute pancreatitis.      Cholelithiasis.      Hepatic steatosis.      Prior splenectomy.      Colonic diverticulosis.      Thank you for enabling us to participate in the care of this patient.          EKG interpretation: (Preliminary)  EKG read by Dr. Mayi Campo at 0     Procedures     Procedures    ED Course     2:24 AM EDT I (Mayi Campo MD) am the first provider for this patient. Initial assessment performed. I reviewed the vital signs, available nursing notes, past medical history, past surgical history, family history and social history. The patients presenting problems have been discussed, and they are in agreement with the care plan formulated and outlined with them.  I have encouraged them to ask questions as they arise throughout their visit.    Records Reviewed: Nursing Notes and Old Medical Records    Is this patient to be included in the SEP-1 core measure? No Exclusion criteria - the patient is NOT to be included for SEP-1 Core Measure due to: May have

## 2024-04-19 NOTE — ED TRIAGE NOTES
Received patient in triage with c/o abdominal pain and vomiting x 1 hour. Pt actively vomiting in triage. Abdomen is firm. Multiple episodes of diarrhea today.

## 2024-04-19 NOTE — ED NOTES
Report given to Otilio    Hide Biopsy Depth?: No Hemostasis: Drysol Depth Of Biopsy: dermis Cryotherapy Text: The wound bed was treated with cryotherapy after the biopsy was performed. Consent: Written consent was obtained and risks were reviewed including but not limited to scarring, infection, bleeding, scabbing, incomplete removal, nerve damage and allergy to anesthesia. X Size Of Lesion In Cm: 0 Biopsy Method: Dermablade Type Of Destruction Used: Curettage Silver Nitrate Text: The wound bed was treated with silver nitrate after the biopsy was performed. Anesthesia Type: 1% lidocaine with epinephrine Wound Care: Petrolatum Was A Bandage Applied: Yes Billing Type: Third-Party Bill Path Notes (To The Dermatopathologist): Cautery at base of biopsy Information: Selecting Yes will display possible errors in your note based on the variables you have selected. This validation is only offered as a suggestion for you. PLEASE NOTE THAT THE VALIDATION TEXT WILL BE REMOVED WHEN YOU FINALIZE YOUR NOTE. IF YOU WANT TO FAX A PRELIMINARY NOTE YOU WILL NEED TO TOGGLE THIS TO 'NO' IF YOU DO NOT WANT IT IN YOUR FAXED NOTE. Curettage Text: The wound bed was treated with curettage after the biopsy was performed. Anesthesia Volume In Cc: 0.5 Electrodesiccation Text: The wound bed was treated with electrodesiccation after the biopsy was performed. Post-Care Instructions: I reviewed with the patient in detail post-care instructions. Patient is to keep the biopsy site dry overnight, and then apply bacitracin twice daily until healed. Patient may apply hydrogen peroxide soaks to remove any crusting. Notification Instructions: Patient will be notified of biopsy results. However, patient instructed to call the office if not contacted within 2 weeks. Electrodesiccation And Curettage Text: The wound bed was treated with electrodesiccation and curettage after the biopsy was performed. Dressing: bandage Size Of Lesion In Cm: 0.8 Biopsy Type: H and E Detail Level: Detailed

## 2024-04-19 NOTE — PLAN OF CARE
74 Y/o male full code c/o abdominal pain it is firm, diarrhea (multiple episodes) and vomiting for an hour,     Allergies: codeine, acetaminophen, atorvastatin, lidocaine  Admitted 04/18/24 Dr. Arciniega (hospitalist), GI, general surgery elevated lipase HTN,     Diagnosis: severe pancreatitis with gallstones    History cardiomyopathy, chemotherapy coronary stent RCA inferior STEMI (2023) atrial fibrillation (on BB, eliquis, digoxin non-hodgkin's lymphoma, CHF, hepatitis C , splenectomy, cholelithiasis, hepatic steatosis, colonic diverticulosis    Currently holding eliquis for procedure    Neuro alert and oriented no pain on arrival to  north, no fevers  Respiratory room air  Cardiac atrial fibrillation   GI ice chips only vomited before coming up to 4 north and several episodes of diarrhea   up to the restroom steady gait however receiving pain medications, asked to call before ambulating  Skin midabdominal scar from surgery, three blanchable red spots on buttocks, feet flaky (fungal infection)??? Red spot on his left elbow, closed however skin is fragile at this area.   Lines 20 Gauge RAC      Problem: Pain  Goal: Verbalizes/displays adequate comfort level or baseline comfort level  Outcome: Progressing

## 2024-04-19 NOTE — PLAN OF CARE
Problem: Pain  Goal: Verbalizes/displays adequate comfort level or baseline comfort level  4/19/2024 1453 by Ricco Thomas, RN  Outcome: Progressing  Flowsheets  Taken 4/19/2024 0910  Verbalizes/displays adequate comfort level or baseline comfort level:   Encourage patient to monitor pain and request assistance   Assess pain using appropriate pain scale   Administer analgesics based on type and severity of pain and evaluate response  Taken 4/19/2024 0815  Verbalizes/displays adequate comfort level or baseline comfort level:   Encourage patient to monitor pain and request assistance   Assess pain using appropriate pain scale   Administer analgesics based on type and severity of pain and evaluate response  4/19/2024 0536 by Otilio Islas, RN  Outcome: Progressing  Flowsheets (Taken 4/19/2024 0515)  Verbalizes/displays adequate comfort level or baseline comfort level:   Encourage patient to monitor pain and request assistance   Assess pain using appropriate pain scale   Implement non-pharmacological measures as appropriate and evaluate response   Administer analgesics based on type and severity of pain and evaluate response

## 2024-04-19 NOTE — H&P
History & Physical    Patient: Reji Dunlap Sr. MRN: [id number]  CSN: [id number]    YOB: 1950  Age: 73 y.o.  Sex: male             DOA: 4/18/2024      Assessment/Plan:    1 Severe Acute Pancreatitis -    Wbc 12k, hold abx for now  Cautious with aggressive iv fluids in setting of cardiomyopathy- ischemic and/or chemotherapy induced; last ef 30-35 percent;   Prn dilaudid q 3 hours  NPO  Prn anti emetics  Gallstone pancreatitis versus etoh pancreatitis- states he rarely drinks nowadays and has cut down significantly; prior h/o heavy use;  General surgery consulted  Coronary stent within one year - October 2023 at [facility]- RCA stent for inferior stemi;   Cardiology and Gi consults in am  Ordered serial lipase; and RUQ Ultrasound    2 Longstanding persistent atrial fibrillation on bb, eliquis, digoxin  Hold eliquis in case needs any procedure     3 Essential HTN controlled    4 H/o non hodgkin's lymphoma in remission   S/p chemotherapy, surgeries including splenectomy    5 CAD s/p stent, ef 30 to 35 percent ; Chronic systolic heart failure, chemotherapy induced cardiomyopathy  On plavix/coreg  Could not tolerate entresto- gave him dizziness and bp dropped to 70s  Cardiology consult for pre-op clearance    6 Non morbid obesity bmi 33    Discussed with wife at bedside  Npo  Eliquis - will be held  Full code  NAZANIN 4/24/2024            Principal Problem:    Acute gallstone pancreatitis  Resolved Problems:    * No resolved hospital problems. *                          HPI:        Chief Complaint:   Chief Complaint   Patient presents with    Abdominal Pain    Emesis     73 years old  male with history of chemo induced cardiomyopathy; non hodgkin's lymphoma which had invaded pancreatic tail- in remission; inferior stemi ([facility] ) in October s/p RCA stent  - permanent afib- followed by dr lutz presented to er with severe abdominal pain -

## 2024-04-19 NOTE — CONSULTS
Maternal Uncle     No Known Problems Paternal Aunt     No Known Problems Paternal Uncle     No Known Problems Maternal Grandmother     No Known Problems Maternal Grandfather     No Known Problems Paternal Grandmother     Heart Attack Paternal Grandfather 65    No Known Problems Other        Allergy:   Allergies   Allergen Reactions    Codeine Other (See Comments)     Pain in his abdominal area.    Acetaminophen Other (See Comments)     Patient states cannot have acetaminophen due to hep C    Atorvastatin Other (See Comments)    Lidocaine Palpitations       Patient Active Problem List   Diagnosis    Hematemesis    Alcohol abuse    Shortness of breath    Lymphoma (HCC)    Nonischemic cardiomyopathy (HCC)    Anemia    Hepatitis C    Acute congestive heart failure (HCC)    CHF exacerbation (HCC)    Abdominal pain    Longstanding persistent atrial fibrillation (HCC)    IVCD (intraventricular conduction defect)    History of acute myocardial infarction    CAD in native artery    S/P drug eluting coronary stent placement    History of splenectomy    Influenza due to other identified influenza virus with other respiratory manifestations    Lymphoma, large-cell, diffuse (HCC)    Acute pancreatitis without infection or necrosis       Home Medications:     @[facility]@    Review of Systems:     Constitutional: No fevers, weight loss.   Skin: No pruritis, jaundice, ulcerations.   HENT: No headaches, nosebleeds, sore throat.   Eyes: No visual changes, blurred vision.   Cardiovascular: No chest pain, heart palpitations.   Respiratory: No cough, SOB, wheezing.   Gastrointestinal: See HPI    Genitourinary: No dysuria, hematuria.   Musculoskeletal: No weakness, arthralgias, wasting.   Heme: No bruising, easy bleeding.   Allergies: As noted.   Psychiatric:  No anxiety, depression, hallucinations.          /89   Pulse 89   Temp 97.8 °F (36.6 °C) (Oral)   Resp 19   Ht 1.778 m (5' 10\")   Wt 104.3 kg (230 lb)   SpO2 96%   
     Blood, Urine Negative NEG      Urobilinogen, Urine 1.0 0.2 - 1.0 EU/dL    Nitrite, Urine Negative NEG      Leukocyte Esterase, Urine Negative NEG     Basic Metabolic Panel    Collection Time: 04/19/24  4:40 AM   Result Value Ref Range    Sodium 137 136 - 145 mmol/L    Potassium 3.9 3.5 - 5.5 mmol/L    Chloride 107 100 - 111 mmol/L    CO2 25 21 - 32 mmol/L    Anion Gap 5 3.0 - 18 mmol/L    Glucose 184 (H) 74 - 99 mg/dL    BUN 18 7.0 - 18 MG/DL    Creatinine 1.18 0.6 - 1.3 MG/DL    Bun/Cre Ratio 15 12 - 20      Est, Glom Filt Rate 66 >60 ml/min/1.73m2    Calcium 8.6 8.5 - 10.1 MG/DL       Diagnostic Studies:  Narrative & Impression  EXAM: CT ABDOMEN AND PELVIS WITH CONTRAST     CLINICAL INDICATION/HISTORY: Abdominal pain, nausea and vomiting     COMPARISON: December 1, 2020     TECHNIQUE:  CT abdomen and pelvis with IV contrast.  All CT scans at this facility are performed using dose optimization technique as  appropriate to the performed examination, to include automated exposure control,  adjustment of the mA and/or kV according to patient's size (including  appropriate matching for site-specific examinations), or use of an iterative  reconstruction technique.     FINDINGS:  Lower chest: Bibasilar atelectasis     Liver: Mild hepatic steatosis.     Biliary: Probable cholelithiasis.     Pancreas: Extensive peripancreatic edema compatible with acute pancreatitis.     Spleen: Splenectomy.     Adrenal glands: Negative.     Kidneys: Negative.     Bladder and Pelvic Organs: Negative.     Stomach, Small Bowel and Colon: Colonic diverticulosis without evidence of acute  diverticulitis. Appendix is normal. There is edema near the stomach.     Lymph nodes: No lymphadenopathy.      Vessels: Atherosclerosis.     Peritoneal Spaces: Small amount of free fluid in the upper abdomen. No free air.     Body wall: Fat-containing ventral hernia in the upper abdomen.     Bones: No acute osseous abnormality.     IMPRESSION:  Severe 
is normal. LVIDd is 4.9 cm. Mildly increased wall thickness. Findings consistent with mild concentric hypertrophy. Global hypokinesis present with severe hypokinesis of the inferior wall segments.    Right Ventricle: Right ventricle size is upper limits of normal. RV basal diameter is 4.0 cm.    Left Atrium: Left atrium is severely dilated. LA Vol Index A/L is 50 mL/m2.    Right Atrium: Right atrium is mildly dilated.    Aortic Valve: Mild to moderate regurgitation with a posterolateral eccentrically directed jet and may underestimate severity.    Tricuspid Valve: Trace regurgitation. Normal RVSP. The estimated RVSP is 24 mmHg.    Aorta: Dilated aortic root. Ao root diameter is 4.0 cm. Mildly dilated ascending aorta. Ao ascending diameter is 3.8 cm.    Signed by: Willian Berry MD on 1/11/2024  1:37 PM    No results found for this or any previous visit.    No results found for this or any previous visit.    Xray Result (most recent):  XR CHEST PORTABLE 12/03/2023    Narrative  EXAM: XR CHEST PORTABLE    INDICATION: Chest pain.    COMPARISON: 12/1/2020    TECHNIQUE: AP radiograph of the chest.    FINDINGS:    Radiographically clear lungs. No pleural effusion or pneumothorax.  Cardiomediastinal silhouette is stable.    Impression  No acute airspace disease.      Signed By: JEFFERSON Waite - NP     April 19, 2024

## 2024-04-19 NOTE — CARE COORDINATION
04/19/24 1436   Service Assessment   Patient Orientation Alert and Oriented;Person;Place;Situation   Cognition Alert   History Provided By Patient;Medical Record   Primary Caregiver Self   Support Systems Family Members   Patient's Healthcare Decision Maker is: Named in Scanned ACP Document   PCP Verified by CM Yes   Last Visit to PCP Within last 3 months   Prior Functional Level Independent in ADLs/IADLs   Current Functional Level Independent in ADLs/IADLs   Can patient return to prior living arrangement Yes   Ability to make needs known: Good   Family able to assist with home care needs: Yes   Financial Resources Medicare   Community Resources None   Social/Functional History   Lives With Family  (resides with sister)   Type of Home Apartment   Home Layout One level  (1 story duplex)   Home Access Stairs to enter with rails   Entrance Stairs - Number of Steps 2   Bathroom Shower/Tub Tub/Shower unit   Bathroom Toilet Standard   Bathroom Equipment None   Bathroom Accessibility Accessible   Receives Help From Family   ADL Assistance Independent   Homemaking Assistance Independent   Homemaking Responsibilities Yes   Ambulation Assistance Independent   Transfer Assistance Independent   Active  Yes   Mode of Transportation Car   Occupation Retired   Discharge Planning   Type of Residence Apartment   Living Arrangements Family Members   Potential Assistance Needed N/A   DME Ordered? No   Potential Assistance Purchasing Medications No   Patient expects to be discharged to: Apartment   One/Two Story Residence One story   Services At/After Discharge   Transition of Care Consult (CM Consult) Discharge Planning   Services At/After Discharge None    Resource Information Provided? No   Mode of Transport at Discharge Other (see comment)   Condition of Participation: Discharge Planning   The Plan for Transition of Care is related to the following treatment goals: Pt dispo home pending medical recs, [phone]-    Problem: Adult Inpatient Plan of Care  Goal: Plan of Care Review  Outcome: Ongoing, Progressing  Goal: Optimal Comfort and Wellbeing  Outcome: Ongoing, Progressing     Problem: Fall Injury Risk  Goal: Absence of Fall and Fall-Related Injury  Outcome: Ongoing, Progressing    Pt still had episodes of fever, medicated with tylenol. Has dilaudid 2mg IV 6hrly PRN. Safety maintained through out shift. Monitoring pt

## 2024-04-19 NOTE — CARE COORDINATION
04/19/24 1202   /Social Work Whiteboard Notes   /Social Work Whiteboard 4.19- New admit GI/ Cardio following, NPO, considering laparoscopic cholecystectomy when pancreatitis resolves.-JEAN-PIERRE Arango

## 2024-04-20 ENCOUNTER — APPOINTMENT (OUTPATIENT)
Facility: HOSPITAL | Age: 74
DRG: 439 | End: 2024-04-20
Payer: MEDICARE

## 2024-04-20 PROBLEM — D72.829 LEUKOCYTOSIS: Status: ACTIVE | Noted: 2024-04-20

## 2024-04-20 LAB
ANION GAP SERPL CALC-SCNC: 4 MMOL/L (ref 3–18)
APTT PPP: 32.8 SEC (ref 23–36.4)
APTT PPP: 33.3 SEC (ref 23–36.4)
APTT PPP: 38.2 SEC (ref 23–36.4)
BASOPHILS # BLD: 0.1 K/UL (ref 0–0.1)
BASOPHILS NFR BLD: 1 % (ref 0–2)
BUN SERPL-MCNC: 17 MG/DL (ref 7–18)
BUN/CREAT SERPL: 17 (ref 12–20)
CALCIUM SERPL-MCNC: 8.9 MG/DL (ref 8.5–10.1)
CHLORIDE SERPL-SCNC: 109 MMOL/L (ref 100–111)
CO2 SERPL-SCNC: 26 MMOL/L (ref 21–32)
CREAT SERPL-MCNC: 1 MG/DL (ref 0.6–1.3)
DIFFERENTIAL METHOD BLD: ABNORMAL
ECHO AO ASC DIAM: 4.2 CM
ECHO AO ASCENDING AORTA INDEX: 1.89 CM/M2
ECHO AO ROOT DIAM: 3.7 CM
ECHO AO ROOT INDEX: 1.67 CM/M2
ECHO BSA: 2.27 M2
ECHO EST RA PRESSURE: 3 MMHG
ECHO LA DIAMETER INDEX: 2.21 CM/M2
ECHO LA DIAMETER: 4.9 CM
ECHO LA TO AORTIC ROOT RATIO: 1.32
ECHO LA VOL A-L A2C: 102 ML (ref 18–58)
ECHO LA VOL A-L A4C: 107 ML (ref 18–58)
ECHO LA VOL BP: 100 ML (ref 18–58)
ECHO LA VOL MOD A2C: 97 ML (ref 18–58)
ECHO LA VOL MOD A4C: 102 ML (ref 18–58)
ECHO LA VOL/BSA BIPLANE: 45 ML/M2 (ref 16–34)
ECHO LA VOLUME AREA LENGTH: 105 ML
ECHO LA VOLUME INDEX A-L A2C: 46 ML/M2 (ref 16–34)
ECHO LA VOLUME INDEX A-L A4C: 48 ML/M2 (ref 16–34)
ECHO LA VOLUME INDEX AREA LENGTH: 47 ML/M2 (ref 16–34)
ECHO LA VOLUME INDEX MOD A2C: 44 ML/M2 (ref 16–34)
ECHO LA VOLUME INDEX MOD A4C: 46 ML/M2 (ref 16–34)
ECHO LV FRACTIONAL SHORTENING: 17 % (ref 28–44)
ECHO LV INTERNAL DIMENSION DIASTOLE INDEX: 2.43 CM/M2
ECHO LV INTERNAL DIMENSION DIASTOLIC: 5.4 CM (ref 4.2–5.9)
ECHO LV INTERNAL DIMENSION SYSTOLIC INDEX: 2.03 CM/M2
ECHO LV INTERNAL DIMENSION SYSTOLIC: 4.5 CM
ECHO LV IVSD: 0.7 CM (ref 0.6–1)
ECHO LV MASS 2D: 155 G (ref 88–224)
ECHO LV MASS INDEX 2D: 69.8 G/M2 (ref 49–115)
ECHO LV POSTERIOR WALL DIASTOLIC: 0.9 CM (ref 0.6–1)
ECHO LV RELATIVE WALL THICKNESS RATIO: 0.33
ECHO RA VOLUME BIPLANE METHOD OF DISKS: 108 ML
ECHO RA VOLUME INDEX BP: 49 ML/M2
EOSINOPHIL # BLD: 0.2 K/UL (ref 0–0.4)
EOSINOPHIL NFR BLD: 1 % (ref 0–5)
ERYTHROCYTE [DISTWIDTH] IN BLOOD BY AUTOMATED COUNT: 14.7 % (ref 11.6–14.5)
GLUCOSE SERPL-MCNC: 112 MG/DL (ref 74–99)
HCT VFR BLD AUTO: 42.8 % (ref 36–48)
HGB BLD-MCNC: 14.5 G/DL (ref 13–16)
IMM GRANULOCYTES # BLD AUTO: 0.1 K/UL (ref 0–0.04)
IMM GRANULOCYTES NFR BLD AUTO: 1 % (ref 0–0.5)
LIPASE SERPL-CCNC: 1507 U/L (ref 13–75)
LYMPHOCYTES # BLD: 2.7 K/UL (ref 0.9–3.6)
LYMPHOCYTES NFR BLD: 15 % (ref 21–52)
MCH RBC QN AUTO: 31.9 PG (ref 24–34)
MCHC RBC AUTO-ENTMCNC: 33.9 G/DL (ref 31–37)
MCV RBC AUTO: 94.3 FL (ref 78–100)
MONOCYTES # BLD: 1.2 K/UL (ref 0.05–1.2)
MONOCYTES NFR BLD: 7 % (ref 3–10)
NEUTS SEG # BLD: 13.8 K/UL (ref 1.8–8)
NEUTS SEG NFR BLD: 76 % (ref 40–73)
NRBC # BLD: 0.02 K/UL (ref 0–0.01)
NRBC BLD-RTO: 0.1 PER 100 WBC
PLATELET # BLD AUTO: 225 K/UL (ref 135–420)
PMV BLD AUTO: 10.9 FL (ref 9.2–11.8)
POTASSIUM SERPL-SCNC: 4 MMOL/L (ref 3.5–5.5)
RBC # BLD AUTO: 4.54 M/UL (ref 4.35–5.65)
SODIUM SERPL-SCNC: 139 MMOL/L (ref 136–145)
WBC # BLD AUTO: 18.1 K/UL (ref 4.6–13.2)

## 2024-04-20 PROCEDURE — 6360000002 HC RX W HCPCS

## 2024-04-20 PROCEDURE — 2580000003 HC RX 258: Performed by: INTERNAL MEDICINE

## 2024-04-20 PROCEDURE — 93325 DOPPLER ECHO COLOR FLOW MAPG: CPT | Performed by: INTERNAL MEDICINE

## 2024-04-20 PROCEDURE — 94761 N-INVAS EAR/PLS OXIMETRY MLT: CPT

## 2024-04-20 PROCEDURE — 93308 TTE F-UP OR LMTD: CPT | Performed by: INTERNAL MEDICINE

## 2024-04-20 PROCEDURE — 6360000002 HC RX W HCPCS: Performed by: STUDENT IN AN ORGANIZED HEALTH CARE EDUCATION/TRAINING PROGRAM

## 2024-04-20 PROCEDURE — 99232 SBSQ HOSP IP/OBS MODERATE 35: CPT | Performed by: INTERNAL MEDICINE

## 2024-04-20 PROCEDURE — 93308 TTE F-UP OR LMTD: CPT

## 2024-04-20 PROCEDURE — 85730 THROMBOPLASTIN TIME PARTIAL: CPT

## 2024-04-20 PROCEDURE — 99232 SBSQ HOSP IP/OBS MODERATE 35: CPT | Performed by: STUDENT IN AN ORGANIZED HEALTH CARE EDUCATION/TRAINING PROGRAM

## 2024-04-20 PROCEDURE — 1100000000 HC RM PRIVATE

## 2024-04-20 PROCEDURE — 36415 COLL VENOUS BLD VENIPUNCTURE: CPT

## 2024-04-20 PROCEDURE — 85025 COMPLETE CBC W/AUTO DIFF WBC: CPT

## 2024-04-20 PROCEDURE — 2580000003 HC RX 258: Performed by: STUDENT IN AN ORGANIZED HEALTH CARE EDUCATION/TRAINING PROGRAM

## 2024-04-20 PROCEDURE — 6370000000 HC RX 637 (ALT 250 FOR IP): Performed by: INTERNAL MEDICINE

## 2024-04-20 PROCEDURE — 80048 BASIC METABOLIC PNL TOTAL CA: CPT

## 2024-04-20 PROCEDURE — 83690 ASSAY OF LIPASE: CPT

## 2024-04-20 PROCEDURE — 93321 DOPPLER ECHO F-UP/LMTD STD: CPT | Performed by: INTERNAL MEDICINE

## 2024-04-20 RX ADMIN — SODIUM CHLORIDE, PRESERVATIVE FREE 10 ML: 5 INJECTION INTRAVENOUS at 08:26

## 2024-04-20 RX ADMIN — PIPERACILLIN AND TAZOBACTAM 4500 MG: 4; .5 INJECTION, POWDER, LYOPHILIZED, FOR SOLUTION INTRAVENOUS; PARENTERAL at 10:48

## 2024-04-20 RX ADMIN — CARVEDILOL 6.25 MG: 6.25 TABLET, FILM COATED ORAL at 16:39

## 2024-04-20 RX ADMIN — HEPARIN SODIUM 13 UNITS/KG/HR: 10000 INJECTION, SOLUTION INTRAVENOUS at 17:14

## 2024-04-20 RX ADMIN — HYDROMORPHONE HYDROCHLORIDE 0.5 MG: 1 INJECTION, SOLUTION INTRAMUSCULAR; INTRAVENOUS; SUBCUTANEOUS at 08:30

## 2024-04-20 RX ADMIN — DIGOXIN 0.12 MG: 125 TABLET ORAL at 08:26

## 2024-04-20 RX ADMIN — HEPARIN SODIUM 4000 UNITS: 1000 INJECTION INTRAVENOUS; SUBCUTANEOUS at 18:17

## 2024-04-20 RX ADMIN — HYDROMORPHONE HYDROCHLORIDE 1 MG: 1 INJECTION, SOLUTION INTRAMUSCULAR; INTRAVENOUS; SUBCUTANEOUS at 17:11

## 2024-04-20 RX ADMIN — HYDROMORPHONE HYDROCHLORIDE 0.5 MG: 1 INJECTION, SOLUTION INTRAMUSCULAR; INTRAVENOUS; SUBCUTANEOUS at 02:26

## 2024-04-20 RX ADMIN — HEPARIN SODIUM 4000 UNITS: 1000 INJECTION INTRAVENOUS; SUBCUTANEOUS at 02:21

## 2024-04-20 RX ADMIN — PIPERACILLIN SODIUM AND TAZOBACTAM SODIUM 3375 MG: 3; .375 INJECTION, POWDER, LYOPHILIZED, FOR SOLUTION INTRAVENOUS at 18:13

## 2024-04-20 RX ADMIN — HEPARIN SODIUM 4000 UNITS: 1000 INJECTION INTRAVENOUS; SUBCUTANEOUS at 10:45

## 2024-04-20 RX ADMIN — CLOPIDOGREL BISULFATE 75 MG: 75 TABLET ORAL at 08:26

## 2024-04-20 ASSESSMENT — PAIN DESCRIPTION - FREQUENCY
FREQUENCY: INTERMITTENT

## 2024-04-20 ASSESSMENT — PAIN DESCRIPTION - ONSET
ONSET: GRADUAL

## 2024-04-20 ASSESSMENT — PAIN DESCRIPTION - LOCATION
LOCATION: ABDOMEN

## 2024-04-20 ASSESSMENT — PAIN DESCRIPTION - ORIENTATION
ORIENTATION: MID
ORIENTATION: MID
ORIENTATION: LEFT;RIGHT
ORIENTATION: LEFT;RIGHT;MID
ORIENTATION: MID
ORIENTATION: MID

## 2024-04-20 ASSESSMENT — PAIN SCALES - GENERAL
PAINLEVEL_OUTOF10: 5
PAINLEVEL_OUTOF10: 0
PAINLEVEL_OUTOF10: 6
PAINLEVEL_OUTOF10: 7
PAINLEVEL_OUTOF10: 5
PAINLEVEL_OUTOF10: 5
PAINLEVEL_OUTOF10: 0

## 2024-04-20 ASSESSMENT — PAIN DESCRIPTION - DESCRIPTORS
DESCRIPTORS: STABBING

## 2024-04-20 ASSESSMENT — PAIN DESCRIPTION - PAIN TYPE
TYPE: ACUTE PAIN

## 2024-04-20 ASSESSMENT — PAIN - FUNCTIONAL ASSESSMENT
PAIN_FUNCTIONAL_ASSESSMENT: ACTIVITIES ARE NOT PREVENTED
PAIN_FUNCTIONAL_ASSESSMENT: ACTIVITIES ARE NOT PREVENTED

## 2024-04-20 NOTE — PLAN OF CARE
Problem: Pain  Goal: Verbalizes/displays adequate comfort level or baseline comfort level  Outcome: Progressing  Flowsheets (Taken 4/19/2024 2035 by Carla Souza RN)  Verbalizes/displays adequate comfort level or baseline comfort level:   Encourage patient to monitor pain and request assistance   Assess pain using appropriate pain scale   Administer analgesics based on type and severity of pain and evaluate response     Problem: Safety - Adult  Goal: Free from fall injury  Outcome: Progressing

## 2024-04-21 VITALS
HEART RATE: 86 BPM | WEIGHT: 230 LBS | DIASTOLIC BLOOD PRESSURE: 73 MMHG | RESPIRATION RATE: 18 BRPM | BODY MASS INDEX: 32.93 KG/M2 | OXYGEN SATURATION: 94 % | HEIGHT: 70 IN | TEMPERATURE: 98.3 F | SYSTOLIC BLOOD PRESSURE: 109 MMHG

## 2024-04-21 LAB
ANION GAP SERPL CALC-SCNC: 3 MMOL/L (ref 3–18)
APTT PPP: 41.7 SEC (ref 23–36.4)
APTT PPP: 46.5 SEC (ref 23–36.4)
BASOPHILS # BLD: 0.1 K/UL (ref 0–0.1)
BASOPHILS NFR BLD: 1 % (ref 0–2)
BUN SERPL-MCNC: 13 MG/DL (ref 7–18)
BUN/CREAT SERPL: 13 (ref 12–20)
CALCIUM SERPL-MCNC: 8.7 MG/DL (ref 8.5–10.1)
CHLORIDE SERPL-SCNC: 106 MMOL/L (ref 100–111)
CO2 SERPL-SCNC: 24 MMOL/L (ref 21–32)
CREAT SERPL-MCNC: 1.01 MG/DL (ref 0.6–1.3)
DIFFERENTIAL METHOD BLD: ABNORMAL
EOSINOPHIL # BLD: 1.2 K/UL (ref 0–0.4)
EOSINOPHIL NFR BLD: 6 % (ref 0–5)
ERYTHROCYTE [DISTWIDTH] IN BLOOD BY AUTOMATED COUNT: 14.5 % (ref 11.6–14.5)
GLUCOSE SERPL-MCNC: 110 MG/DL (ref 74–99)
HCT VFR BLD AUTO: 39.2 % (ref 36–48)
HGB BLD-MCNC: 13.2 G/DL (ref 13–16)
IMM GRANULOCYTES # BLD AUTO: 0.1 K/UL (ref 0–0.04)
IMM GRANULOCYTES NFR BLD AUTO: 1 % (ref 0–0.5)
LIPASE SERPL-CCNC: 305 U/L (ref 13–75)
LYMPHOCYTES # BLD: 2.6 K/UL (ref 0.9–3.6)
LYMPHOCYTES NFR BLD: 13 % (ref 21–52)
MCH RBC QN AUTO: 32 PG (ref 24–34)
MCHC RBC AUTO-ENTMCNC: 33.7 G/DL (ref 31–37)
MCV RBC AUTO: 94.9 FL (ref 78–100)
MONOCYTES # BLD: 1.5 K/UL (ref 0.05–1.2)
MONOCYTES NFR BLD: 8 % (ref 3–10)
NEUTS SEG # BLD: 14.5 K/UL (ref 1.8–8)
NEUTS SEG NFR BLD: 73 % (ref 40–73)
NRBC # BLD: 0.02 K/UL (ref 0–0.01)
NRBC BLD-RTO: 0.1 PER 100 WBC
PLATELET # BLD AUTO: 192 K/UL (ref 135–420)
PMV BLD AUTO: 11.9 FL (ref 9.2–11.8)
POTASSIUM SERPL-SCNC: 3.6 MMOL/L (ref 3.5–5.5)
RBC # BLD AUTO: 4.13 M/UL (ref 4.35–5.65)
SODIUM SERPL-SCNC: 133 MMOL/L (ref 136–145)
WBC # BLD AUTO: 19.9 K/UL (ref 4.6–13.2)

## 2024-04-21 PROCEDURE — 6360000002 HC RX W HCPCS

## 2024-04-21 PROCEDURE — 36415 COLL VENOUS BLD VENIPUNCTURE: CPT

## 2024-04-21 PROCEDURE — 80048 BASIC METABOLIC PNL TOTAL CA: CPT

## 2024-04-21 PROCEDURE — 83690 ASSAY OF LIPASE: CPT

## 2024-04-21 PROCEDURE — 6370000000 HC RX 637 (ALT 250 FOR IP): Performed by: INTERNAL MEDICINE

## 2024-04-21 PROCEDURE — 6360000002 HC RX W HCPCS: Performed by: STUDENT IN AN ORGANIZED HEALTH CARE EDUCATION/TRAINING PROGRAM

## 2024-04-21 PROCEDURE — 2580000003 HC RX 258: Performed by: INTERNAL MEDICINE

## 2024-04-21 PROCEDURE — 99232 SBSQ HOSP IP/OBS MODERATE 35: CPT | Performed by: INTERNAL MEDICINE

## 2024-04-21 PROCEDURE — 94761 N-INVAS EAR/PLS OXIMETRY MLT: CPT

## 2024-04-21 PROCEDURE — 2580000003 HC RX 258: Performed by: STUDENT IN AN ORGANIZED HEALTH CARE EDUCATION/TRAINING PROGRAM

## 2024-04-21 PROCEDURE — 85025 COMPLETE CBC W/AUTO DIFF WBC: CPT

## 2024-04-21 PROCEDURE — 99239 HOSP IP/OBS DSCHRG MGMT >30: CPT | Performed by: STUDENT IN AN ORGANIZED HEALTH CARE EDUCATION/TRAINING PROGRAM

## 2024-04-21 PROCEDURE — 85730 THROMBOPLASTIN TIME PARTIAL: CPT

## 2024-04-21 RX ORDER — OXYCODONE HYDROCHLORIDE 5 MG/1
5 TABLET ORAL EVERY 6 HOURS PRN
Qty: 12 TABLET | Refills: 0 | Status: SHIPPED | OUTPATIENT
Start: 2024-04-21 | End: 2024-04-24

## 2024-04-21 RX ORDER — ONDANSETRON 4 MG/1
4 TABLET, ORALLY DISINTEGRATING ORAL EVERY 8 HOURS PRN
Qty: 21 TABLET | Refills: 0 | Status: SHIPPED | OUTPATIENT
Start: 2024-04-21 | End: 2024-04-28

## 2024-04-21 RX ORDER — CARVEDILOL 3.12 MG/1
3.12 TABLET ORAL EVERY 12 HOURS
Qty: 60 TABLET | Refills: 0 | Status: SHIPPED | OUTPATIENT
Start: 2024-04-21

## 2024-04-21 RX ORDER — CARVEDILOL 3.12 MG/1
3.12 TABLET ORAL EVERY 12 HOURS
Status: DISCONTINUED | OUTPATIENT
Start: 2024-04-21 | End: 2024-04-21 | Stop reason: HOSPADM

## 2024-04-21 RX ADMIN — PIPERACILLIN SODIUM AND TAZOBACTAM SODIUM 3375 MG: 3; .375 INJECTION, POWDER, LYOPHILIZED, FOR SOLUTION INTRAVENOUS at 02:13

## 2024-04-21 RX ADMIN — PIPERACILLIN SODIUM AND TAZOBACTAM SODIUM 3375 MG: 3; .375 INJECTION, POWDER, LYOPHILIZED, FOR SOLUTION INTRAVENOUS at 08:19

## 2024-04-21 RX ADMIN — HEPARIN SODIUM 17 UNITS/KG/HR: 10000 INJECTION, SOLUTION INTRAVENOUS at 08:56

## 2024-04-21 RX ADMIN — SODIUM CHLORIDE, PRESERVATIVE FREE 10 ML: 5 INJECTION INTRAVENOUS at 00:51

## 2024-04-21 RX ADMIN — CARVEDILOL 6.25 MG: 6.25 TABLET, FILM COATED ORAL at 04:21

## 2024-04-21 RX ADMIN — HYDROMORPHONE HYDROCHLORIDE 1 MG: 1 INJECTION, SOLUTION INTRAMUSCULAR; INTRAVENOUS; SUBCUTANEOUS at 08:55

## 2024-04-21 RX ADMIN — HYDROMORPHONE HYDROCHLORIDE 1 MG: 1 INJECTION, SOLUTION INTRAMUSCULAR; INTRAVENOUS; SUBCUTANEOUS at 00:51

## 2024-04-21 RX ADMIN — SODIUM CHLORIDE, PRESERVATIVE FREE 10 ML: 5 INJECTION INTRAVENOUS at 08:22

## 2024-04-21 RX ADMIN — DIGOXIN 0.12 MG: 125 TABLET ORAL at 08:19

## 2024-04-21 ASSESSMENT — PAIN SCALES - GENERAL
PAINLEVEL_OUTOF10: 1
PAINLEVEL_OUTOF10: 8
PAINLEVEL_OUTOF10: 0
PAINLEVEL_OUTOF10: 1
PAINLEVEL_OUTOF10: 9
PAINLEVEL_OUTOF10: 1
PAINLEVEL_OUTOF10: 2
PAINLEVEL_OUTOF10: 0

## 2024-04-21 ASSESSMENT — PAIN DESCRIPTION - PAIN TYPE
TYPE: ACUTE PAIN
TYPE: ACUTE PAIN

## 2024-04-21 ASSESSMENT — PAIN DESCRIPTION - FREQUENCY
FREQUENCY: INTERMITTENT
FREQUENCY: INTERMITTENT

## 2024-04-21 ASSESSMENT — PAIN - FUNCTIONAL ASSESSMENT
PAIN_FUNCTIONAL_ASSESSMENT: ACTIVITIES ARE NOT PREVENTED
PAIN_FUNCTIONAL_ASSESSMENT: ACTIVITIES ARE NOT PREVENTED
PAIN_FUNCTIONAL_ASSESSMENT: PREVENTS OR INTERFERES SOME ACTIVE ACTIVITIES AND ADLS

## 2024-04-21 ASSESSMENT — PAIN DESCRIPTION - LOCATION
LOCATION: ABDOMEN

## 2024-04-21 ASSESSMENT — PAIN DESCRIPTION - DESCRIPTORS
DESCRIPTORS: SHOOTING;SHARP;STABBING
DESCRIPTORS: CRAMPING;PRESSURE;SHARP
DESCRIPTORS: DULL;TENDER

## 2024-04-21 ASSESSMENT — PAIN DESCRIPTION - ORIENTATION
ORIENTATION: UPPER;MID
ORIENTATION: LOWER;ANTERIOR
ORIENTATION: MID;UPPER

## 2024-04-21 ASSESSMENT — PAIN DESCRIPTION - ONSET: ONSET: GRADUAL

## 2024-04-21 NOTE — PLAN OF CARE
Problem: Pain  Goal: Verbalizes/displays adequate comfort level or baseline comfort level  Flowsheets  Taken 4/21/2024 0821 by Jesika Salguero RN  Verbalizes/displays adequate comfort level or baseline comfort level:   Encourage patient to monitor pain and request assistance   Assess pain using appropriate pain scale   Administer analgesics based on type and severity of pain and evaluate response   Implement non-pharmacological measures as appropriate and evaluate response   Consider cultural and social influences on pain and pain management  Taken 4/21/2024 0120 by Shane Das RN  Verbalizes/displays adequate comfort level or baseline comfort level:   Administer analgesics based on type and severity of pain and evaluate response   Assess pain using appropriate pain scale  Taken 4/20/2024 1946 by Shane Das RN  Verbalizes/displays adequate comfort level or baseline comfort level:   Assess pain using appropriate pain scale   Administer analgesics based on type and severity of pain and evaluate response     Problem: Safety - Adult  Goal: Free from fall injury  Outcome: Progressing  Flowsheets (Taken 4/21/2024 0821)  Free From Fall Injury:   Instruct family/caregiver on patient safety   Based on caregiver fall risk screen, instruct family/caregiver to ask for assistance with transferring infant if caregiver noted to have fall risk factors

## 2024-04-21 NOTE — DISCHARGE SUMMARY
Discharge Summary    Patient: Reji Dunalp Sr. MRN: [id number]  CSN: [id number]    YOB: 1950  Age: 73 y.o.  Sex: male    DOA: 4/18/2024 LOS:  LOS: 2 days        Disposition: Home    Discharge Date: 04/21/2024    Admission Diagnosis: Acute gallstone pancreatitis [K85.10]  Acute pancreatitis without infection or necrosis, unspecified pancreatitis type [K85.90]    Discharge Diagnosis:    Acute pancreatitis    Discharge Condition: Stable      PHYSICAL EXAM  Visit Vitals  /73   Pulse 86   Temp 98.3 °F (36.8 °C) (Oral)   Resp 18   Ht 1.778 m (5' 10\")   Wt 104.3 kg (230 lb)   SpO2 94%   BMI 33.00 kg/m²       General: Alert, cooperative, no acute distress    HEENT: PERRLA, EOMI. Anicteric sclerae.  Lungs:  CTA Bilaterally. No Wheezing/Rales.  Heart:             Regular rate and Rhythm.  Abdomen: Soft, Non distended, Non tender. + Bowel sounds.  Extremities: No edema.  Psych:   Good insight. Not anxious or agitated.  Neurologic:  AA, oriented X 3. Moves all ext                                 Hospital Course:   73-year-old male past medical history of chemo induced cardiomyopathy, non-Hodgkin's lymphoma status postsplenectomy, CAD status post RCA stent, atrial fibrillation presents with worsening abdominal pain.  Patient was found to have acute pancreatitis.  Patient was also found to have gallstones.  General surgery was consulted.  Patient was started on gentle fluid resuscitation due to cardiomyopathy.  GI was consulted.  MRCP was ordered.  It showed cholelithiasis with no common bile duct obstruction.  Cardiology was consulted for possible preop clearance.  Therefore patient was started on heparin drip for stroke prophylaxis.  Patient's abdominal pain improved.  Diet was advanced.  Therefore patient was hemodynamically stable to be discharged home.  Patient was instructed to follow-up with surgery outpatient for elective cholecystectomy.  Plan was discussed with patient and patient's

## 2024-04-25 NOTE — PROGRESS NOTES
Physician Progress Note      PATIENT:               PONCHO GUNDERSON  CSN #:                  [id number]  :                       1950  ADMIT DATE:       2024 10:35 PM  DISCH DATE:        2024 4:26 PM  RESPONDING  PROVIDER #:        Orville Marcum DO          QUERY TEXT:    Dear Dr Marcum    Patient admitted with acute  pancreatitis and noted to have WBC 21.2  with  HR    102  within  24  hrs  of  admit.  If possible, please document in progress   notes and discharge summary if you are evaluating and/or treating any of the   following:      The medical record reflects the following:  Risk Factors: alcohol dependence;  obesity  Clinical Indicators:  admit  WBC  12.7  HR  79  up  to  102  /    WBC    21.2  HR  94,96  /-  WBC  18.1  HR  103  - WBC  19.9  Treatment: IV  Zosyn    Thank you,   Tracey Maloney  RN   CCDS  Options provided:  -- SIRS of non-infectious origin due to acute  pancreatitis  without acute   organ dysfunction  -- Other - I will add my own diagnosis  -- Disagree - Not applicable / Not valid  -- Disagree - Clinically unable to determine / Unknown  -- Refer to Clinical Documentation Reviewer    PROVIDER RESPONSE TEXT:    This patient has SIRS of non-infectious origin due to acute pancreatitis   without acute organ dysfunction.    Query created by: Ramandeep Maloney on 2024 6:31 AM      Electronically signed by:  Orville Marcum DO 2024 10:25 AM          
Advance Care Planning   Healthcare Decision Maker:    Primary Decision Maker: Tracey Dunlap - Other - [phone]    Secondary Decision Maker: Charmaine Dietz - Other - [phone]    Click here to complete Healthcare Decision Makers including selection of the Healthcare Decision Maker Relationship (ie \"Primary\").       conducted an initial consultation and Spiritual Assessment for Reji Dunlap Sr., who is a 73 y.o.,male. Patient's Primary Language is: English.   According to the patient's EMR Buddhism Affiliation is: [employer].     The reason the Patient came to the hospital is:   Patient Active Problem List    Diagnosis Date Noted    Leukocytosis 04/20/2024    Acute pancreatitis without infection or necrosis 04/19/2024    History of splenectomy 01/11/2024    Influenza due to other identified influenza virus with other respiratory manifestations 01/11/2024    Lymphoma, large-cell, diffuse (HCC) 01/11/2024    S/P drug eluting coronary stent placement 10/07/2023    History of acute myocardial infarction 10/05/2023    CAD in native artery 10/05/2023    IVCD (intraventricular conduction defect) 06/27/2022    Alcohol abuse 12/17/2020    Nonischemic cardiomyopathy (HCC) 12/17/2020    Shortness of breath 10/17/2020    Acute congestive heart failure (HCC) 10/17/2020    CHF exacerbation (HCC) 10/17/2020    Longstanding persistent atrial fibrillation (HCC) 10/17/2020    Hematemesis 10/13/2013    Lymphoma (HCC) 10/13/2013    Anemia 10/13/2013    Abdominal pain 08/10/2013    Hepatitis C 11/05/2012        The  provided the following Interventions:  Initiated a relationship of care and support.   Explored issues of luis, belief, spirituality and Church/ritual needs while hospitalized.  Listened empathically.  Provided chaplaincy education concerning Advance Medical Directive.  Provided information about Spiritual Care Services.  Offered prayer and assurance of continued prayers on patient's behalf. 
[facility] - Progress Note    Admit Date: 4/18/2024  Attending Cardiologist: Dr. Berry    Assessment:     Patient Active Problem List    Diagnosis Date Noted    Acute pancreatitis without infection or necrosis 04/19/2024    History of splenectomy 01/11/2024    Influenza due to other identified influenza virus with other respiratory manifestations 01/11/2024    Lymphoma, large-cell, diffuse (HCC) 01/11/2024    S/P drug eluting coronary stent placement 10/07/2023    History of acute myocardial infarction 10/05/2023    CAD in native artery 10/05/2023    IVCD (intraventricular conduction defect) 06/27/2022    Alcohol abuse 12/17/2020    Nonischemic cardiomyopathy (HCC) 12/17/2020    Shortness of breath 10/17/2020    Acute congestive heart failure (HCC) 10/17/2020    CHF exacerbation (HCC) 10/17/2020    Longstanding persistent atrial fibrillation (HCC) 10/17/2020    Hematemesis 10/13/2013    Lymphoma (HCC) 10/13/2013    Anemia 10/13/2013    Abdominal pain 08/10/2013    Hepatitis C 11/05/2012       - Abdominal pain. Primary reason for admission.  CT of abdomen and pelvis 4/19/2024 with acute pancreatitis, cholelithiasis, hepatic steatosis, colonic diverticulitis. Lipase > 5,000. AST mildly elevated at 77. Denies significant ETOH use  - CAD:   Cardiac Catheterization 10/5/2023:  LM:  Medium sized vessel with mild luminal irregularities.  LAD:  Medium sized vessel with 40-45% mid stenosis, D1 is medium sized with mild luminal irregularities.  LCx:  Medium sized vessel with mild luminal irregularities, OM2 is medium to large sized with 40-45% mid stenosis.  RCA:  Medium to large sized, dominant vessel with 99% (subtotal) mid stenosis with KARY 1 flow distally.  PCI to RCA with LIVAN.  Was on triple therapy, aspirin, plavix, and eliquis for 30 days. Now on Eliquis and plavix. Refused statins in the past.  - Chronic systolic heart failure: EF 35% on Echo 1/2024. Appears compensated. On carvedilol and 
Has had few episodes of upper abdominal pain Since last October. Pain goes to the back. Also radiation of pain to rt uq. Starts in epigastrium. Pain is better today.  PE: /63   Pulse (!) 103   Temp 98.4 °F (36.9 °C) (Oral)   Resp 18   Ht 1.778 m (5' 10\")   Wt 104.3 kg (230 lb)   SpO2 95%   BMI 33.00 kg/m²   Abdomen soft benign exam  Neuro non focal.  Labs reviewed.  MRI report Pending.  CT and US cholelithiasis TRG neg.    A/P: Abdominal pain and pancreatitis-Most likely related to passing of stones from GB. Await MRI report. Consider lap meli? Lymphoma in past. HEP C in remission. CHF with Afib.    VERONIKA Brandon MD  
MRI screening form needs to be filled out and faxed to [phone] BEFORE MRI can be scheduled.  If unable to obtain information from patient , [facility] needs to be contacted . If patient is claustrophobic or will needs pain meds, please have ordered in advance in order to facilitate exam.   
Says the abdominal pain continuous mid to upper abdomen from few days is \" whole lot better\"  Denies nausea or vomiting.  PE: BP (!) 93/59   Pulse 85   Temp 98.6 °F (37 °C) (Oral)   Resp 18   Ht 1.778 m (5' 10\")   Wt 104.3 kg (230 lb)   SpO2 97%   BMI 33.00 kg/m²   Abdomen tender no rebound no organomegaly.  Heart sounds normal  Labs reviewed.  MRI prelim report no CBD stones.   A/P: Pancreatitis related to gallstones? Improving. MRI neg for CBD stones. No other GI in put. Advance diet. Will sign off. Call us as needed.    VERONIKA Brandon MD  
0.9 % sodium chloride infusion   IntraVENous PRN    potassium chloride (KLOR-CON M) extended release tablet 40 mEq  40 mEq Oral PRN    Or    potassium bicarb-citric acid (EFFER-K) effervescent tablet 40 mEq  40 mEq Oral PRN    Or    potassium chloride 10 mEq/100 mL IVPB (Peripheral Line)  10 mEq IntraVENous PRN    magnesium sulfate 2000 mg in 50 mL IVPB premix  2,000 mg IntraVENous PRN    ondansetron (ZOFRAN-ODT) disintegrating tablet 4 mg  4 mg Oral Q8H PRN    Or    ondansetron (ZOFRAN) injection 4 mg  4 mg IntraVENous Q6H PRN    polyethylene glycol (GLYCOLAX) packet 17 g  17 g Oral Daily PRN    [Held by provider] apixaban (ELIQUIS) tablet 5 mg  5 mg Oral BID    [Held by provider] clopidogrel (PLAVIX) tablet 75 mg  75 mg Oral Daily    digoxin (LANOXIN) tablet 0.125 mg  0.125 mg Oral Daily    HYDROmorphone (DILAUDID) injection 0.5 mg  0.5 mg IntraVENous Q3H PRN    Or    HYDROmorphone HCl PF (DILAUDID) injection 1 mg  1 mg IntraVENous Q3H PRN    heparin (porcine) injection 4,000 Units  4,000 Units IntraVENous PRN    heparin (porcine) injection 2,000 Units  2,000 Units IntraVENous PRN    heparin 25,000 units in dextrose 5% 250 mL (premix) infusion  5-30 Units/kg/hr IntraVENous Continuous      Allergies   Allergen Reactions    Codeine Other (See Comments)     Pain in his abdominal area.    Acetaminophen Other (See Comments)     Patient states cannot have acetaminophen due to hep C    Atorvastatin Other (See Comments)    Lidocaine Palpitations        Review of Systems:  Pertinent items are noted in the History of Present Illness.    Objective:     BP (!) 93/59   Pulse 85   Temp 98.6 °F (37 °C) (Oral)   Resp 18   Ht 1.778 m (5' 10\")   Wt 104.3 kg (230 lb)   SpO2 97%   BMI 33.00 kg/m²     Physical Exam:      General:  in no apparent distress, alert, oriented times 3, and afebrile                   Abdomen:   rounded, soft, nontender                 Imaging and Lab Review:     CBC:   Lab Results   Component Value 
PRN  polyethylene glycol (GLYCOLAX) packet 17 g, 17 g, Oral, Daily PRN  [Held by provider] apixaban (ELIQUIS) tablet 5 mg, 5 mg, Oral, BID  carvedilol (COREG) tablet 6.25 mg, 6.25 mg, Oral, Q12H  clopidogrel (PLAVIX) tablet 75 mg, 75 mg, Oral, Daily  digoxin (LANOXIN) tablet 0.125 mg, 0.125 mg, Oral, Daily  HYDROmorphone (DILAUDID) injection 0.5 mg, 0.5 mg, IntraVENous, Q3H PRN **OR** HYDROmorphone HCl PF (DILAUDID) injection 1 mg, 1 mg, IntraVENous, Q3H PRN  heparin (porcine) injection 4,000 Units, 4,000 Units, IntraVENous, PRN  heparin (porcine) injection 2,000 Units, 2,000 Units, IntraVENous, PRN  heparin 25,000 units in dextrose 5% 250 mL (premix) infusion, 5-30 Units/kg/hr, IntraVENous, Continuous      Assessment//Plan           Hospital Problems             Last Modified POA    * (Principal) Acute pancreatitis without infection or necrosis 4/19/2024 Yes    Alcohol abuse 4/20/2024 Yes    Lymphoma (HCC) 4/20/2024 Yes    Nonischemic cardiomyopathy (HCC) 4/20/2024 Yes    Longstanding persistent atrial fibrillation (HCC) 4/20/2024 Yes    CAD in native artery 4/20/2024 Yes    S/P drug eluting coronary stent placement 4/20/2024 Yes    Overview Signed 10/7/2023 11:25 AM by Andrew Chang MD     LIVAN to RCA on 10/5/23         Leukocytosis 4/20/2024 Yes     Assessment:    Condition: In stable condition.  Improving.   (    #Acute pancreatitis. Gallstone vs ethanol etiology. MRCP shows no obstruction  #Alcohol dependence, has cut down in the past few months  #CAD s/p stent to RCA in 10/2023  #Longstanding persistent atrial fibrillation   #Essential HTN controlled  #H/o non hodgkin's lymphoma in remission. S/p chemotherapy, surgeries including splenectomy  #HFrEF. 30 to 35 percent. chemotherapy induced cardiomyopathy. Not in exacerbation  #Obesity bmi 33).     Plan:   Consults: cardiology, gastroenterology and general surgery.  Full liquid diet.  Administer medications as ordered, start antibiotics and give fluids.   
chloride infusion   IntraVENous PRN    potassium chloride (KLOR-CON M) extended release tablet 40 mEq  40 mEq Oral PRN    Or    potassium bicarb-citric acid (EFFER-K) effervescent tablet 40 mEq  40 mEq Oral PRN    Or    potassium chloride 10 mEq/100 mL IVPB (Peripheral Line)  10 mEq IntraVENous PRN    magnesium sulfate 2000 mg in 50 mL IVPB premix  2,000 mg IntraVENous PRN    ondansetron (ZOFRAN-ODT) disintegrating tablet 4 mg  4 mg Oral Q8H PRN    Or    ondansetron (ZOFRAN) injection 4 mg  4 mg IntraVENous Q6H PRN    polyethylene glycol (GLYCOLAX) packet 17 g  17 g Oral Daily PRN    [Held by provider] apixaban (ELIQUIS) tablet 5 mg  5 mg Oral BID    carvedilol (COREG) tablet 6.25 mg  6.25 mg Oral Q12H    [Held by provider] clopidogrel (PLAVIX) tablet 75 mg  75 mg Oral Daily    digoxin (LANOXIN) tablet 0.125 mg  0.125 mg Oral Daily    HYDROmorphone (DILAUDID) injection 0.5 mg  0.5 mg IntraVENous Q3H PRN    Or    HYDROmorphone HCl PF (DILAUDID) injection 1 mg  1 mg IntraVENous Q3H PRN    heparin (porcine) injection 4,000 Units  4,000 Units IntraVENous PRN    heparin (porcine) injection 2,000 Units  2,000 Units IntraVENous PRN    heparin 25,000 units in dextrose 5% 250 mL (premix) infusion  5-30 Units/kg/hr IntraVENous Continuous         Intake/Output Summary (Last 24 hours) at 4/21/2024 0932  Last data filed at 4/21/2024 0822  Gross per 24 hour   Intake 899.58 ml   Output 500 ml   Net 399.58 ml       Physical Exam:  General:  alert, appears stated age, and cooperative  Neck:  no JVD  Lungs:  clear to auscultation bilaterally  Heart:  irregularly irregular rhythm  Abdomen:  abdomen is tender, soft, without masses, organomegaly or guarding  Extremities:  extremities normal, atraumatic, no cyanosis or edema    Visit Vitals  BP (!) 93/59   Pulse 85   Temp 98.6 °F (37 °C) (Oral)   Resp 18   Ht 1.778 m (5' 10\")   Wt 104.3 kg (230 lb)   SpO2 97%   BMI 33.00 kg/m²       Data Review:     Labs: Results:       Chemistry Recent 
Absolute 1.2 0.05 - 1.2 K/UL    Eosinophils Absolute 0.2 0.0 - 0.4 K/UL    Basophils Absolute 0.1 0.0 - 0.1 K/UL    Immature Granulocytes Absolute 0.1 (H) 0.00 - 0.04 K/UL    Differential Type AUTOMATED     Lipase    Collection Time: 04/20/24 12:33 AM   Result Value Ref Range    Lipase 1,507 (H) 13 - 75 U/L   APTT    Collection Time: 04/20/24 12:33 AM   Result Value Ref Range    APTT 32.8 23.0 - 36.4 SEC   Basic Metabolic Panel    Collection Time: 04/20/24 12:33 AM   Result Value Ref Range    Sodium 139 136 - 145 mmol/L    Potassium 4.0 3.5 - 5.5 mmol/L    Chloride 109 100 - 111 mmol/L    CO2 26 21 - 32 mmol/L    Anion Gap 4 3.0 - 18 mmol/L    Glucose 112 (H) 74 - 99 mg/dL    BUN 17 7.0 - 18 MG/DL    Creatinine 1.00 0.6 - 1.3 MG/DL    Bun/Cre Ratio 17 12 - 20      Est, Glom Filt Rate 80 >60 ml/min/1.73m2    Calcium 8.9 8.5 - 10.1 MG/DL   APTT    Collection Time: 04/20/24  9:31 AM   Result Value Ref Range    APTT 33.3 23.0 - 36.4 SEC   Echo (TTE) limited (PRN contrast/bubble/strain/3D)    Collection Time: 04/20/24  9:51 AM   Result Value Ref Range    IVSd 0.7 0.6 - 1.0 cm    LVIDd 5.4 4.2 - 5.9 cm    LVIDs 4.5 cm    LVPWd 0.9 0.6 - 1.0 cm    LA Diameter 4.9 cm    LA Volume A/L 105 mL    LA Volume A-L A4C 102 (A) 18 - 58 mL    LA Volume A-L A4C 107 (A) 18 - 58 mL    LA Volume MOD A2C 97 (A) 18 - 58 mL    LA Volume MOD A4C 102 (A) 18 - 58 mL    LA Volume  (A) 18 - 58 mL    Ascending Aorta 4.2 cm    Aortic Root 3.7 cm    Body Surface Area 2.27 m2    Fractional Shortening 2D 17 28 - 44 %    LVIDd Index 2.43 cm/m2    LVIDs Index 2.03 cm/m2    LV RWT Ratio 0.33     LV Mass 2D 155.0 88 - 224 g    LV Mass 2D Index 69.8 49 - 115 g/m2    LA Volume Index BP 45 (A) 16 - 34 ml/m2    LA Volume Index A/L 47 16 - 34 mL/m2    LA Volume Index A-L A2C 46 (A) 16 - 34 mL/m2    LA Volume Index A-L A4C 48 (A) 16 - 34 mL/m2    LA Volume Index MOD A2C 44 (A) 16 - 34 ml/m2    LA Volume Index MOD A4C 46 (A) 16 - 34 ml/m2    LA Size Index

## 2024-05-03 ENCOUNTER — OFFICE VISIT (OUTPATIENT)
Age: 74
End: 2024-05-03
Payer: MEDICARE

## 2024-05-03 VITALS
SYSTOLIC BLOOD PRESSURE: 130 MMHG | RESPIRATION RATE: 14 BRPM | BODY MASS INDEX: 29.78 KG/M2 | OXYGEN SATURATION: 97 % | HEIGHT: 70 IN | TEMPERATURE: 96.9 F | DIASTOLIC BLOOD PRESSURE: 82 MMHG | WEIGHT: 208 LBS | HEART RATE: 81 BPM

## 2024-05-03 DIAGNOSIS — K85.10 GALLSTONE PANCREATITIS: Primary | ICD-10-CM

## 2024-05-03 PROCEDURE — G8417 CALC BMI ABV UP PARAM F/U: HCPCS | Performed by: SURGERY

## 2024-05-03 PROCEDURE — 1123F ACP DISCUSS/DSCN MKR DOCD: CPT | Performed by: SURGERY

## 2024-05-03 PROCEDURE — 1111F DSCHRG MED/CURRENT MED MERGE: CPT | Performed by: SURGERY

## 2024-05-03 PROCEDURE — 3017F COLORECTAL CA SCREEN DOC REV: CPT | Performed by: SURGERY

## 2024-05-03 PROCEDURE — 1036F TOBACCO NON-USER: CPT | Performed by: SURGERY

## 2024-05-03 PROCEDURE — G8427 DOCREV CUR MEDS BY ELIG CLIN: HCPCS | Performed by: SURGERY

## 2024-05-03 PROCEDURE — 99204 OFFICE O/P NEW MOD 45 MIN: CPT | Performed by: SURGERY

## 2024-05-03 ASSESSMENT — ENCOUNTER SYMPTOMS
ABDOMINAL DISTENTION: 0
SHORTNESS OF BREATH: 0
VOMITING: 0
ABDOMINAL PAIN: 1
CHEST TIGHTNESS: 0
NAUSEA: 0

## 2024-05-03 NOTE — PROGRESS NOTES
Reji SKY Germán Iniguez is a 73 y.o. male (: 1950)    Chief Complaint   Patient presents with    New Patient     Pancreatitis/ Gallbladder/ Hernia        Medication list and allergies have been reviewed with Reji Dunlap Sr. and updated as of today's date.     I have gone over all Medical, Surgical and Social History with Reji Dunlap Sr. and updated/added the information accordingly.    
console.    COMPARISON: Preceding CT and ultrasound    FINDINGS:    Liver: Hepatomegaly with steatosis    Gallbladder/biliary/MRCP: There is no intrahepatic biliary ductal dilatation.  There is a nearly 3 cm gallstone within the gallbladder. Does not look impacted.  No finding for common bile duct stone. Towards the distal aspect there is some  narrowing which could be tapering as it approaches the medial duodenal wall.  Local stricture possible. Mild diffuse dilatation of the pancreatic duct without  obstructing focus. Small foci of cystic sidebranch ectasia suggested. No  concerning features.    Pancreas: Edematous morphology. No focal necrosis or fluid collection within.    Spleen: Absent    Kidneys: Punctate cysts are present    Adrenals: Unremarkable.    Intestines/mesentery: Moderately pronounced amount of mesenteric  inflammation/edema at the pancreas left upper quadrant. Small amount near the  gallbladder as well. No loculated fluid collection evident. There are some large  bowel diverticula.    Vascular: Patent aorta, IVC, portal vein    Lymph nodes: Unremarkable.    Lung bases: Small pleural effusion left more than right.    Musculoskeletal: Unremarkable for age    Body wall: There is a 5 cm lobulated fatty ventral hernia  Impression: 1. Findings of acute pancreatitis with moderately severe regional soft tissue  edema/inflammation.  -No pancreatic necrosis or abscess suggested  -No obstructing common bile duct or pancreatic duct stone  2. Large caliber gallstone within the gallbladder. Mild gallbladder hydrops.  3. Details as above  4. Concordant preliminary interpretation was submitted 4/20/2024 at 1416 hours  by Dr. Milan.         Physical Exam:  /82   Pulse 81   Temp 96.9 °F (36.1 °C)   Resp 14   Ht 1.778 m (5' 10\")   Wt 94.3 kg (208 lb)   SpO2 97%   BMI 29.84 kg/m²  BMI: Body mass index is 29.84 kg/m².    Physical Exam  Constitutional:       Appearance: Normal appearance.   HENT:

## 2024-06-03 ENCOUNTER — TELEPHONE (OUTPATIENT)
Age: 74
End: 2024-06-03

## 2024-06-03 NOTE — TELEPHONE ENCOUNTER
Patient called the office stating that the next refill for the Eliquis would be $277.00 for only a 30 day supply. He called his insurance and they stated Xarelto will be affordable for him. He wants to switch. Will discuss with Dr. Berry.    Verbal order and read back per Willian Berry MD  Stop Eliquis once you run out ans switch to Xarelto 20 mg once a day.    This has been fully explained to the patient, who indicates understanding.

## 2024-06-14 ENCOUNTER — TELEPHONE (OUTPATIENT)
Age: 74
End: 2024-06-14

## 2024-06-14 ENCOUNTER — OFFICE VISIT (OUTPATIENT)
Age: 74
End: 2024-06-14

## 2024-06-14 VITALS
OXYGEN SATURATION: 96 % | SYSTOLIC BLOOD PRESSURE: 130 MMHG | HEART RATE: 64 BPM | HEIGHT: 70 IN | DIASTOLIC BLOOD PRESSURE: 70 MMHG | WEIGHT: 206 LBS | BODY MASS INDEX: 29.49 KG/M2

## 2024-06-14 DIAGNOSIS — I45.4 NONSPECIFIC INTRAVENTRICULAR BLOCK: ICD-10-CM

## 2024-06-14 DIAGNOSIS — I42.9 CARDIOMYOPATHY, UNSPECIFIED TYPE (HCC): ICD-10-CM

## 2024-06-14 DIAGNOSIS — I21.9 MYOCARDIAL INFARCTION, UNSPECIFIED MI TYPE, UNSPECIFIED ARTERY (HCC): ICD-10-CM

## 2024-06-14 DIAGNOSIS — F10.11 ALCOHOL ABUSE, IN REMISSION: ICD-10-CM

## 2024-06-14 DIAGNOSIS — Z95.5 S/P DRUG ELUTING CORONARY STENT PLACEMENT: ICD-10-CM

## 2024-06-14 DIAGNOSIS — I50.22 CHRONIC SYSTOLIC (CONGESTIVE) HEART FAILURE (HCC): ICD-10-CM

## 2024-06-14 DIAGNOSIS — I48.11 LONGSTANDING PERSISTENT ATRIAL FIBRILLATION (HCC): Primary | ICD-10-CM

## 2024-06-14 ASSESSMENT — PATIENT HEALTH QUESTIONNAIRE - PHQ9
SUM OF ALL RESPONSES TO PHQ QUESTIONS 1-9: 0
1. LITTLE INTEREST OR PLEASURE IN DOING THINGS: NOT AT ALL
2. FEELING DOWN, DEPRESSED OR HOPELESS: NOT AT ALL
SUM OF ALL RESPONSES TO PHQ9 QUESTIONS 1 & 2: 0
SUM OF ALL RESPONSES TO PHQ QUESTIONS 1-9: 0

## 2024-06-14 ASSESSMENT — ANXIETY QUESTIONNAIRES
3. WORRYING TOO MUCH ABOUT DIFFERENT THINGS: NOT AT ALL
6. BECOMING EASILY ANNOYED OR IRRITABLE: NOT AT ALL
GAD7 TOTAL SCORE: 0
5. BEING SO RESTLESS THAT IT IS HARD TO SIT STILL: NOT AT ALL
1. FEELING NERVOUS, ANXIOUS, OR ON EDGE: NOT AT ALL
2. NOT BEING ABLE TO STOP OR CONTROL WORRYING: NOT AT ALL
7. FEELING AFRAID AS IF SOMETHING AWFUL MIGHT HAPPEN: NOT AT ALL
4. TROUBLE RELAXING: NOT AT ALL

## 2024-06-14 ASSESSMENT — ENCOUNTER SYMPTOMS
SHORTNESS OF BREATH: 0
NAUSEA: 0
SORE THROAT: 0
COUGH: 0
ABDOMINAL PAIN: 0
ABDOMINAL DISTENTION: 0
VOMITING: 0

## 2024-06-14 NOTE — PROGRESS NOTES
24     Reji Dunlap Sr.  is a 73 y.o. male     Chief Complaint   Patient presents with    Follow-Up from Hospital     24 - 24: CHF    Surgical Clearance     24: Laparoscopic Cholecystectomy with Dr. Wright at [facility]       HPI  Patient presents for a follow-up office visit.  He was initially hospitalized in 2020 with new onset atrial fibrillation and rapid ventricular response and was found to have a severe dilated nonischemic cardiomyopathy with ejection fraction of less than 15%.  He did have some mild heart failure symptoms during that admission.  An echocardiogram demonstrated a mildly dilated left ventricle, EF less than 15%, mild aortic regurgitation mild aortic root dilatation and biatrial enlargement.  He also underwent a cardiac catheterization during that admission which showed single-vessel disease of his ramus intermedius branch with a dilated cardiomyopathy out of proportion to his degree of coronary disease, thus a diagnosis of a dilated nonischemic cardiomyopathy.  The patient does have a history of non-Hodgkin's lymphoma receiving [facility] chemotherapy 6 to 7 years ago.  He reports that he recently started drinking heavily in 2020 after his wife  after battling cancer.  He was drinking almost 750 cc of vodka daily.  Following hospital discharge in October he states he briefly quit drinking but unfortunately started back up and unfortunately was readmitted to the hospital at the beginning of 2020 for acute on chronic systolic heart failure and rapid atrial fibrillation.  He was diuresed, his medications were adjusted and he was discharged home.        Previously, the patient decided not to pursue an AICD.  He has since stopped taking his Entresto altogether due to severe hypotension with the medication with systolic blood pressure readings as low as 75 with symptoms of dizziness and lightheadedness.  He underwent a follow-up echocardiogram in January

## 2024-06-14 NOTE — PROGRESS NOTES
Reji Dunlap Sr. presents today for   Chief Complaint   Patient presents with    Follow-Up from Hospital     4/18/24 - 4/21/24: CHF    Surgical Clearance     6/27/24: Laparoscopic Cholecystectomy with Dr. Wright at [facility]       Reji Dunlap Sr. preferred language for health care discussion is english/other.    Is someone accompanying this pt? no    Is the patient using any DME equipment during OV? no    Depression Screening:  Depression: Not at risk (6/14/2024)    PHQ-2     PHQ-2 Score: 0        Learning Assessment:  Who is the primary learner? Patient    What is the preferred language for health care of the primary learner? ENGLISH    How does the primary learner prefer to learn new concepts? DEMONSTRATION    Answered By patient    Relationship to Learner SELF           Pt currently taking Anticoagulant therapy? Xarelto 20 mg daily    Pt currently taking Antiplatelet therapy ? Plavix 75 mg daily      Coordination of Care:  1. Have you been to the ER, urgent care clinic since your last visit? Hospitalized since your last visit? no    2. Have you seen or consulted any other health care providers outside of the [facility] since your last visit? Include any pap smears or colon screening. no

## 2024-06-14 NOTE — TELEPHONE ENCOUNTER
Patient called this afternoon and wanted to cancel his cholecystectomy scheduled for 6/27/2024 with Dr. Wright.

## 2024-06-27 RX ORDER — DIGOXIN 125 MCG
0.12 TABLET ORAL DAILY
Qty: 90 TABLET | Refills: 3 | Status: SHIPPED | OUTPATIENT
Start: 2024-06-27

## 2024-06-27 RX ORDER — CARVEDILOL 3.12 MG/1
3.12 TABLET ORAL EVERY 12 HOURS
Qty: 180 TABLET | Refills: 3 | Status: SHIPPED | OUTPATIENT
Start: 2024-06-27

## 2024-07-05 ENCOUNTER — TELEPHONE (OUTPATIENT)
Age: 74
End: 2024-07-05

## 2024-07-05 NOTE — TELEPHONE ENCOUNTER
Cardiac Clearance : Tooth Pulled, 07-10-24  - Patient is on Plavix and Xarelto  - Will discuss with Dr. Berry

## 2024-07-05 NOTE — TELEPHONE ENCOUNTER
Verbal order and read back per Willian Berry MD  Can hold Xarelto 2 days prior but continue Plavix till October 2024.

## 2024-10-22 ENCOUNTER — OFFICE VISIT (OUTPATIENT)
Age: 74
End: 2024-10-22
Payer: MEDICARE

## 2024-10-22 VITALS
DIASTOLIC BLOOD PRESSURE: 88 MMHG | HEART RATE: 64 BPM | OXYGEN SATURATION: 96 % | WEIGHT: 214 LBS | HEIGHT: 70 IN | BODY MASS INDEX: 30.64 KG/M2 | SYSTOLIC BLOOD PRESSURE: 130 MMHG

## 2024-10-22 DIAGNOSIS — I48.11 LONGSTANDING PERSISTENT ATRIAL FIBRILLATION (HCC): ICD-10-CM

## 2024-10-22 DIAGNOSIS — I21.9 MYOCARDIAL INFARCTION, UNSPECIFIED MI TYPE, UNSPECIFIED ARTERY (HCC): ICD-10-CM

## 2024-10-22 DIAGNOSIS — Z95.5 S/P DRUG ELUTING CORONARY STENT PLACEMENT: ICD-10-CM

## 2024-10-22 DIAGNOSIS — I42.9 CARDIOMYOPATHY, UNSPECIFIED TYPE (HCC): ICD-10-CM

## 2024-10-22 DIAGNOSIS — F10.11 ALCOHOL ABUSE, IN REMISSION: ICD-10-CM

## 2024-10-22 DIAGNOSIS — I50.22 CHRONIC SYSTOLIC (CONGESTIVE) HEART FAILURE (HCC): Primary | ICD-10-CM

## 2024-10-22 DIAGNOSIS — I45.4 NONSPECIFIC INTRAVENTRICULAR BLOCK: ICD-10-CM

## 2024-10-22 PROCEDURE — G8484 FLU IMMUNIZE NO ADMIN: HCPCS | Performed by: INTERNAL MEDICINE

## 2024-10-22 PROCEDURE — 1036F TOBACCO NON-USER: CPT | Performed by: INTERNAL MEDICINE

## 2024-10-22 PROCEDURE — G8417 CALC BMI ABV UP PARAM F/U: HCPCS | Performed by: INTERNAL MEDICINE

## 2024-10-22 PROCEDURE — 99214 OFFICE O/P EST MOD 30 MIN: CPT | Performed by: INTERNAL MEDICINE

## 2024-10-22 PROCEDURE — 1123F ACP DISCUSS/DSCN MKR DOCD: CPT | Performed by: INTERNAL MEDICINE

## 2024-10-22 PROCEDURE — G8427 DOCREV CUR MEDS BY ELIG CLIN: HCPCS | Performed by: INTERNAL MEDICINE

## 2024-10-22 PROCEDURE — 93000 ELECTROCARDIOGRAM COMPLETE: CPT | Performed by: INTERNAL MEDICINE

## 2024-10-22 PROCEDURE — 3017F COLORECTAL CA SCREEN DOC REV: CPT | Performed by: INTERNAL MEDICINE

## 2024-10-22 RX ORDER — ASPIRIN 81 MG/1
81 TABLET ORAL DAILY
COMMUNITY

## 2024-10-22 ASSESSMENT — ANXIETY QUESTIONNAIRES
1. FEELING NERVOUS, ANXIOUS, OR ON EDGE: NOT AT ALL
2. NOT BEING ABLE TO STOP OR CONTROL WORRYING: NOT AT ALL
6. BECOMING EASILY ANNOYED OR IRRITABLE: NOT AT ALL
5. BEING SO RESTLESS THAT IT IS HARD TO SIT STILL: NOT AT ALL
4. TROUBLE RELAXING: NOT AT ALL
3. WORRYING TOO MUCH ABOUT DIFFERENT THINGS: NOT AT ALL
7. FEELING AFRAID AS IF SOMETHING AWFUL MIGHT HAPPEN: NOT AT ALL
GAD7 TOTAL SCORE: 0

## 2024-10-22 ASSESSMENT — PATIENT HEALTH QUESTIONNAIRE - PHQ9
SUM OF ALL RESPONSES TO PHQ QUESTIONS 1-9: 0
SUM OF ALL RESPONSES TO PHQ QUESTIONS 1-9: 0
1. LITTLE INTEREST OR PLEASURE IN DOING THINGS: NOT AT ALL
SUM OF ALL RESPONSES TO PHQ QUESTIONS 1-9: 0
SUM OF ALL RESPONSES TO PHQ QUESTIONS 1-9: 0
SUM OF ALL RESPONSES TO PHQ9 QUESTIONS 1 & 2: 0
2. FEELING DOWN, DEPRESSED OR HOPELESS: NOT AT ALL

## 2024-10-22 ASSESSMENT — ENCOUNTER SYMPTOMS
ABDOMINAL DISTENTION: 0
COUGH: 0
NAUSEA: 0
SHORTNESS OF BREATH: 0
VOMITING: 0
SORE THROAT: 0
ABDOMINAL PAIN: 0

## 2024-10-22 NOTE — PROGRESS NOTES
10/22/24     Reji Dunlap Inge  is a 74 y.o. male     Chief Complaint   Patient presents with    Follow-up     4 month       HPI    Patient presents for a follow-up office visit.  He was initially hospitalized in 2020 with new onset atrial fibrillation and rapid ventricular response and was found to have a severe dilated nonischemic cardiomyopathy with ejection fraction of less than 15%.  He did have some mild heart failure symptoms during that admission.  An echocardiogram demonstrated a mildly dilated left ventricle, EF less than 15%, mild aortic regurgitation mild aortic root dilatation and biatrial enlargement.  He also underwent a cardiac catheterization during that admission which showed single-vessel disease of his ramus intermedius branch with a dilated cardiomyopathy out of proportion to his degree of coronary disease, thus a diagnosis of a dilated nonischemic cardiomyopathy.  The patient does have a history of non-Hodgkin's lymphoma receiving CHOP chemotherapy 6 to 7 years ago.  He reports that he recently started drinking heavily in 2020 after his wife  after battling cancer.  He was drinking almost 750 cc of vodka daily.  Following hospital discharge in October he states he briefly quit drinking but unfortunately started back up and unfortunately was readmitted to the hospital at the beginning of 2020 for acute on chronic systolic heart failure and rapid atrial fibrillation.  He was diuresed, his medications were adjusted and he was discharged home.        Previously, the patient decided not to pursue an AICD.  He has since stopped taking his Entresto altogether due to severe hypotension with the medication with systolic blood pressure readings as low as 75 with symptoms of dizziness and lightheadedness.  He underwent a follow-up echocardiogram in 2023 which showed significant improvement in his overall LV function, EF had increased to 45-50% with global

## 2024-10-22 NOTE — PATIENT INSTRUCTIONS
Medication Stopping : Plavix - Once bottle is done  Medication Starting : Aspirin 81 mg once a day - Once Plavix has been stopped

## 2024-10-22 NOTE — PROGRESS NOTES
Reji Dunlap Sr. presents today for   Chief Complaint   Patient presents with    Follow-up     4 month       Reji Dunlap Sr. preferred language for health care discussion is english/other.    Is someone accompanying this pt? no    Is the patient using any DME equipment during OV? no    Depression Screening:  Depression: Not at risk (10/22/2024)    PHQ-2     PHQ-2 Score: 0        Learning Assessment:  Who is the primary learner? Patient    What is the preferred language for health care of the primary learner? ENGLISH    How does the primary learner prefer to learn new concepts? DEMONSTRATION    Answered By patient    Relationship to Learner SELF           Pt currently taking Anticoagulant therapy? Xarelto 20 mg daily    Pt currently taking Antiplatelet therapy ? Plavix 75 mg daily      Coordination of Care:  1. Have you been to the ER, urgent care clinic since your last visit? Hospitalized since your last visit? no    2. Have you seen or consulted any other health care providers outside of the [facility] since your last visit? Include any pap smears or colon screening. no

## 2024-11-12 RX ORDER — SPIRONOLACTONE 25 MG/1
12.5 TABLET ORAL DAILY
Qty: 45 TABLET | Refills: 3 | Status: SHIPPED | OUTPATIENT
Start: 2024-11-12

## 2024-12-30 RX ORDER — CARVEDILOL 3.12 MG/1
3.12 TABLET ORAL EVERY 12 HOURS
Qty: 180 TABLET | Refills: 3 | Status: SHIPPED | OUTPATIENT
Start: 2024-12-30

## 2024-12-30 RX ORDER — DIGOXIN 125 MCG
0.12 TABLET ORAL DAILY
Qty: 90 TABLET | Refills: 3 | Status: SHIPPED | OUTPATIENT
Start: 2024-12-30

## 2025-01-29 RX ORDER — CLOPIDOGREL BISULFATE 75 MG/1
75 TABLET ORAL DAILY
Qty: 90 TABLET | Refills: 3 | OUTPATIENT
Start: 2025-01-29

## 2025-04-29 ENCOUNTER — OFFICE VISIT (OUTPATIENT)
Age: 75
End: 2025-04-29
Payer: MEDICARE

## 2025-04-29 VITALS
SYSTOLIC BLOOD PRESSURE: 112 MMHG | BODY MASS INDEX: 31.07 KG/M2 | DIASTOLIC BLOOD PRESSURE: 82 MMHG | OXYGEN SATURATION: 96 % | WEIGHT: 217 LBS | HEIGHT: 70 IN | HEART RATE: 65 BPM

## 2025-04-29 DIAGNOSIS — I50.22 CHRONIC SYSTOLIC (CONGESTIVE) HEART FAILURE (HCC): Primary | ICD-10-CM

## 2025-04-29 DIAGNOSIS — I48.11 LONGSTANDING PERSISTENT ATRIAL FIBRILLATION (HCC): ICD-10-CM

## 2025-04-29 DIAGNOSIS — F10.11 ALCOHOL ABUSE, IN REMISSION: ICD-10-CM

## 2025-04-29 DIAGNOSIS — I45.4 NONSPECIFIC INTRAVENTRICULAR BLOCK: ICD-10-CM

## 2025-04-29 DIAGNOSIS — Z95.5 S/P DRUG ELUTING CORONARY STENT PLACEMENT: ICD-10-CM

## 2025-04-29 DIAGNOSIS — I42.8 NONISCHEMIC CARDIOMYOPATHY (HCC): ICD-10-CM

## 2025-04-29 DIAGNOSIS — I42.9 CARDIOMYOPATHY, UNSPECIFIED TYPE (HCC): ICD-10-CM

## 2025-04-29 DIAGNOSIS — I21.9 MYOCARDIAL INFARCTION, UNSPECIFIED MI TYPE, UNSPECIFIED ARTERY (HCC): ICD-10-CM

## 2025-04-29 PROCEDURE — 3017F COLORECTAL CA SCREEN DOC REV: CPT | Performed by: INTERNAL MEDICINE

## 2025-04-29 PROCEDURE — 99214 OFFICE O/P EST MOD 30 MIN: CPT | Performed by: INTERNAL MEDICINE

## 2025-04-29 PROCEDURE — 1036F TOBACCO NON-USER: CPT | Performed by: INTERNAL MEDICINE

## 2025-04-29 PROCEDURE — 1123F ACP DISCUSS/DSCN MKR DOCD: CPT | Performed by: INTERNAL MEDICINE

## 2025-04-29 PROCEDURE — G8427 DOCREV CUR MEDS BY ELIG CLIN: HCPCS | Performed by: INTERNAL MEDICINE

## 2025-04-29 PROCEDURE — 1160F RVW MEDS BY RX/DR IN RCRD: CPT | Performed by: INTERNAL MEDICINE

## 2025-04-29 PROCEDURE — 1159F MED LIST DOCD IN RCRD: CPT | Performed by: INTERNAL MEDICINE

## 2025-04-29 PROCEDURE — 1126F AMNT PAIN NOTED NONE PRSNT: CPT | Performed by: INTERNAL MEDICINE

## 2025-04-29 PROCEDURE — G8417 CALC BMI ABV UP PARAM F/U: HCPCS | Performed by: INTERNAL MEDICINE

## 2025-04-29 PROCEDURE — 93000 ELECTROCARDIOGRAM COMPLETE: CPT | Performed by: INTERNAL MEDICINE

## 2025-04-29 RX ORDER — NITROGLYCERIN 0.4 MG/1
0.4 TABLET SUBLINGUAL EVERY 5 MIN PRN
Qty: 25 TABLET | Refills: 3 | Status: SHIPPED | OUTPATIENT
Start: 2025-04-29

## 2025-04-29 ASSESSMENT — PATIENT HEALTH QUESTIONNAIRE - PHQ9
1. LITTLE INTEREST OR PLEASURE IN DOING THINGS: NOT AT ALL
SUM OF ALL RESPONSES TO PHQ QUESTIONS 1-9: 0
2. FEELING DOWN, DEPRESSED OR HOPELESS: NOT AT ALL
SUM OF ALL RESPONSES TO PHQ QUESTIONS 1-9: 0

## 2025-04-29 ASSESSMENT — ENCOUNTER SYMPTOMS
VOMITING: 0
SORE THROAT: 0
COUGH: 0
NAUSEA: 0
ABDOMINAL PAIN: 0
SHORTNESS OF BREATH: 1
ABDOMINAL DISTENTION: 0

## 2025-04-29 ASSESSMENT — ANXIETY QUESTIONNAIRES
6. BECOMING EASILY ANNOYED OR IRRITABLE: NOT AT ALL
7. FEELING AFRAID AS IF SOMETHING AWFUL MIGHT HAPPEN: NOT AT ALL
2. NOT BEING ABLE TO STOP OR CONTROL WORRYING: NOT AT ALL
GAD7 TOTAL SCORE: 0
5. BEING SO RESTLESS THAT IT IS HARD TO SIT STILL: NOT AT ALL
1. FEELING NERVOUS, ANXIOUS, OR ON EDGE: NOT AT ALL
3. WORRYING TOO MUCH ABOUT DIFFERENT THINGS: NOT AT ALL
4. TROUBLE RELAXING: NOT AT ALL

## 2025-04-29 NOTE — PROGRESS NOTES
25     Reji Dunlap Inge  is a 74 y.o. male     Chief Complaint   Patient presents with    Follow-up     6 month f/u with no concerns        HPI    Patient presents for a follow-up office visit.  He was initially hospitalized in 2020 with new onset atrial fibrillation and rapid ventricular response and was found to have a severe dilated nonischemic cardiomyopathy with ejection fraction of less than 15%.  He did have some mild heart failure symptoms during that admission.  An echocardiogram demonstrated a mildly dilated left ventricle, EF less than 15%, mild aortic regurgitation mild aortic root dilatation and biatrial enlargement.  He also underwent a cardiac catheterization during that admission which showed single-vessel disease of his ramus intermedius branch with a dilated cardiomyopathy out of proportion to his degree of coronary disease, thus a diagnosis of a dilated nonischemic cardiomyopathy.  The patient does have a history of non-Hodgkin's lymphoma receiving CHOP chemotherapy 6 to 7 years ago.  He reports that he recently started drinking heavily in 2020 after his wife  after battling cancer.  He was drinking almost 750 cc of vodka daily.  Following hospital discharge in October he states he briefly quit drinking but unfortunately started back up and unfortunately was readmitted to the hospital at the beginning of 2020 for acute on chronic systolic heart failure and rapid atrial fibrillation.  He was diuresed, his medications were adjusted and he was discharged home.        Previously, the patient decided not to pursue an AICD.  He has since stopped taking his Entresto altogether due to severe hypotension with the medication with systolic blood pressure readings as low as 75 with symptoms of dizziness and lightheadedness.  He underwent a follow-up echocardiogram in 2023 which showed significant improvement in his overall LV function, EF had increased to 45-50% 
Reji Dunlap Sr. presents today for   Chief Complaint   Patient presents with    Follow-up     6 month f/u with no concerns        Reji Dunlap Sr. preferred language for health care discussion is english/other.    Is someone accompanying this pt? no    Is the patient using any DME equipment during OV? no    Depression Screening:  Depression: Not at risk (4/29/2025)    PHQ-2     PHQ-2 Score: 0        Learning Assessment:  Who is the primary learner? Patient    What is the preferred language for health care of the primary learner? ENGLISH    How does the primary learner prefer to learn new concepts? DEMONSTRATION    Answered By patient    Relationship to Learner SELF           Pt currently taking Anticoagulant therapy? Xarelto 20 mg QD     Pt currently taking Antiplatelet therapy ? ASA 81 mg QD       Coordination of Care:  1. Have you been to the ER, urgent care clinic since your last visit? Hospitalized since your last visit? no    2. Have you seen or consulted any other health care providers outside of the [facility] since your last visit? Include any pap smears or colon screening. no    
24

## 2025-06-21 ENCOUNTER — HOSPITAL ENCOUNTER (EMERGENCY)
Facility: HOSPITAL | Age: 75
Discharge: ANOTHER HOSPITAL | End: 2025-06-22
Attending: EMERGENCY MEDICINE
Payer: MEDICARE

## 2025-06-21 DIAGNOSIS — R10.12 ACUTE BILATERAL UPPER ABDOMINAL PAIN: ICD-10-CM

## 2025-06-21 DIAGNOSIS — I10 ELEVATED BLOOD PRESSURE READING WITH DIAGNOSIS OF HYPERTENSION: ICD-10-CM

## 2025-06-21 DIAGNOSIS — R74.01 ELEVATED LIVER TRANSAMINASE LEVEL: ICD-10-CM

## 2025-06-21 DIAGNOSIS — K80.60 CALCULUS OF GALLBLADDER AND BILE DUCT WITH CHOLECYSTITIS WITHOUT OBSTRUCTION, UNSPECIFIED CHOLECYSTITIS ACUITY: Primary | ICD-10-CM

## 2025-06-21 DIAGNOSIS — R10.11 ACUTE BILATERAL UPPER ABDOMINAL PAIN: ICD-10-CM

## 2025-06-21 LAB
BASOPHILS # BLD: 0.05 K/UL (ref 0–0.1)
BASOPHILS NFR BLD: 0.3 % (ref 0–2)
DIFFERENTIAL METHOD BLD: ABNORMAL
EOSINOPHIL # BLD: 0.18 K/UL (ref 0–0.4)
EOSINOPHIL NFR BLD: 1.1 % (ref 0–5)
ERYTHROCYTE [DISTWIDTH] IN BLOOD BY AUTOMATED COUNT: 14.5 % (ref 11.6–14.5)
HCT VFR BLD AUTO: 47.4 % (ref 36–48)
HGB BLD-MCNC: 15.6 G/DL (ref 13–16)
IMM GRANULOCYTES # BLD AUTO: 0.08 K/UL (ref 0–0.04)
IMM GRANULOCYTES NFR BLD AUTO: 0.5 % (ref 0–0.5)
LIPASE SERPL-CCNC: 45 U/L (ref 13–75)
LYMPHOCYTES # BLD: 1.3 K/UL (ref 0.9–3.6)
LYMPHOCYTES NFR BLD: 8.1 % (ref 21–52)
MCH RBC QN AUTO: 30.8 PG (ref 24–34)
MCHC RBC AUTO-ENTMCNC: 32.9 G/DL (ref 31–37)
MCV RBC AUTO: 93.7 FL (ref 78–100)
MONOCYTES # BLD: 1.27 K/UL (ref 0.05–1.2)
MONOCYTES NFR BLD: 7.9 % (ref 3–10)
NEUTS SEG # BLD: 13.18 K/UL (ref 1.8–8)
NEUTS SEG NFR BLD: 82.1 % (ref 40–73)
NRBC # BLD: 0 K/UL (ref 0–0.01)
NRBC BLD-RTO: 0 PER 100 WBC
PLATELET # BLD AUTO: 270 K/UL (ref 135–420)
PMV BLD AUTO: 11.5 FL (ref 9.2–11.8)
RBC # BLD AUTO: 5.06 M/UL (ref 4.35–5.65)
WBC # BLD AUTO: 16.1 K/UL (ref 4.6–13.2)

## 2025-06-21 PROCEDURE — 85025 COMPLETE CBC W/AUTO DIFF WBC: CPT

## 2025-06-21 PROCEDURE — 80053 COMPREHEN METABOLIC PANEL: CPT

## 2025-06-21 PROCEDURE — 83690 ASSAY OF LIPASE: CPT

## 2025-06-21 PROCEDURE — 84484 ASSAY OF TROPONIN QUANT: CPT

## 2025-06-21 PROCEDURE — 99285 EMERGENCY DEPT VISIT HI MDM: CPT

## 2025-06-21 RX ORDER — 0.9 % SODIUM CHLORIDE 0.9 %
1000 INTRAVENOUS SOLUTION INTRAVENOUS ONCE
Status: COMPLETED | OUTPATIENT
Start: 2025-06-22 | End: 2025-06-22

## 2025-06-21 RX ORDER — HYDROMORPHONE HYDROCHLORIDE 1 MG/ML
1 INJECTION, SOLUTION INTRAMUSCULAR; INTRAVENOUS; SUBCUTANEOUS
Refills: 0 | Status: COMPLETED | OUTPATIENT
Start: 2025-06-22 | End: 2025-06-22

## 2025-06-21 ASSESSMENT — PAIN DESCRIPTION - LOCATION: LOCATION: ABDOMEN

## 2025-06-21 ASSESSMENT — PAIN - FUNCTIONAL ASSESSMENT: PAIN_FUNCTIONAL_ASSESSMENT: 0-10

## 2025-06-21 ASSESSMENT — PAIN SCALES - GENERAL: PAINLEVEL_OUTOF10: 9

## 2025-06-22 ENCOUNTER — APPOINTMENT (OUTPATIENT)
Facility: HOSPITAL | Age: 75
End: 2025-06-22
Payer: MEDICARE

## 2025-06-22 VITALS
SYSTOLIC BLOOD PRESSURE: 127 MMHG | HEART RATE: 75 BPM | HEIGHT: 70 IN | RESPIRATION RATE: 16 BRPM | DIASTOLIC BLOOD PRESSURE: 82 MMHG | OXYGEN SATURATION: 97 % | BODY MASS INDEX: 30.64 KG/M2 | TEMPERATURE: 98.8 F | WEIGHT: 214 LBS

## 2025-06-22 LAB
ALBUMIN SERPL-MCNC: 3.9 G/DL (ref 3.4–5)
ALBUMIN/GLOB SERPL: 1.1 (ref 0.8–1.7)
ALP SERPL-CCNC: 116 U/L (ref 45–117)
ALT SERPL-CCNC: 135 U/L (ref 10–50)
ANION GAP SERPL CALC-SCNC: 13 MMOL/L (ref 3–18)
APPEARANCE UR: CLEAR
AST SERPL-CCNC: 195 U/L (ref 10–38)
BILIRUB SERPL-MCNC: 2.4 MG/DL (ref 0.2–1)
BILIRUB UR QL: ABNORMAL
BUN SERPL-MCNC: 26 MG/DL (ref 6–23)
BUN/CREAT SERPL: 19 (ref 12–20)
CALCIUM SERPL-MCNC: 10.1 MG/DL (ref 8.5–10.1)
CHLORIDE SERPL-SCNC: 103 MMOL/L (ref 98–107)
CO2 SERPL-SCNC: 23 MMOL/L (ref 21–32)
COLOR UR: ABNORMAL
CREAT SERPL-MCNC: 1.38 MG/DL (ref 0.6–1.3)
GLOBULIN SER CALC-MCNC: 3.7 G/DL (ref 2–4)
GLUCOSE SERPL-MCNC: 154 MG/DL (ref 74–108)
GLUCOSE UR STRIP.AUTO-MCNC: NEGATIVE MG/DL
HGB UR QL STRIP: NEGATIVE
KETONES UR QL STRIP.AUTO: NEGATIVE MG/DL
LEUKOCYTE ESTERASE UR QL STRIP.AUTO: NEGATIVE
NITRITE UR QL STRIP.AUTO: NEGATIVE
PH UR STRIP: 5.5 (ref 5–8)
POTASSIUM SERPL-SCNC: 4.3 MMOL/L (ref 3.5–5.5)
PROT SERPL-MCNC: 7.6 G/DL (ref 6.4–8.2)
PROT UR STRIP-MCNC: NEGATIVE MG/DL
SODIUM SERPL-SCNC: 139 MMOL/L (ref 136–145)
SP GR UR REFRACTOMETRY: >1.03 (ref 1–1.03)
TROPONIN T SERPL HS-MCNC: 15.2 NG/L (ref 0–22)
UROBILINOGEN UR QL STRIP.AUTO: 1 EU/DL (ref 0.2–1)

## 2025-06-22 PROCEDURE — 74183 MRI ABD W/O CNTR FLWD CNTR: CPT

## 2025-06-22 PROCEDURE — 96365 THER/PROPH/DIAG IV INF INIT: CPT

## 2025-06-22 PROCEDURE — 2580000003 HC RX 258: Performed by: EMERGENCY MEDICINE

## 2025-06-22 PROCEDURE — 6360000002 HC RX W HCPCS: Performed by: STUDENT IN AN ORGANIZED HEALTH CARE EDUCATION/TRAINING PROGRAM

## 2025-06-22 PROCEDURE — A9577 INJ MULTIHANCE: HCPCS | Performed by: EMERGENCY MEDICINE

## 2025-06-22 PROCEDURE — 94761 N-INVAS EAR/PLS OXIMETRY MLT: CPT

## 2025-06-22 PROCEDURE — 2580000003 HC RX 258: Performed by: STUDENT IN AN ORGANIZED HEALTH CARE EDUCATION/TRAINING PROGRAM

## 2025-06-22 PROCEDURE — 74177 CT ABD & PELVIS W/CONTRAST: CPT

## 2025-06-22 PROCEDURE — 96376 TX/PRO/DX INJ SAME DRUG ADON: CPT

## 2025-06-22 PROCEDURE — 81003 URINALYSIS AUTO W/O SCOPE: CPT

## 2025-06-22 PROCEDURE — 6360000002 HC RX W HCPCS: Performed by: EMERGENCY MEDICINE

## 2025-06-22 PROCEDURE — 6360000004 HC RX CONTRAST MEDICATION: Performed by: EMERGENCY MEDICINE

## 2025-06-22 PROCEDURE — 6370000000 HC RX 637 (ALT 250 FOR IP): Performed by: STUDENT IN AN ORGANIZED HEALTH CARE EDUCATION/TRAINING PROGRAM

## 2025-06-22 PROCEDURE — 93005 ELECTROCARDIOGRAM TRACING: CPT | Performed by: EMERGENCY MEDICINE

## 2025-06-22 PROCEDURE — 96375 TX/PRO/DX INJ NEW DRUG ADDON: CPT

## 2025-06-22 PROCEDURE — 93005 ELECTROCARDIOGRAM TRACING: CPT | Performed by: STUDENT IN AN ORGANIZED HEALTH CARE EDUCATION/TRAINING PROGRAM

## 2025-06-22 RX ORDER — IOPAMIDOL 612 MG/ML
100 INJECTION, SOLUTION INTRAVASCULAR
Status: COMPLETED | OUTPATIENT
Start: 2025-06-22 | End: 2025-06-22

## 2025-06-22 RX ORDER — SPIRONOLACTONE 25 MG/1
12.5 TABLET ORAL DAILY
Status: DISCONTINUED | OUTPATIENT
Start: 2025-06-22 | End: 2025-06-22 | Stop reason: HOSPADM

## 2025-06-22 RX ORDER — CARVEDILOL 3.12 MG/1
3.12 TABLET ORAL EVERY 12 HOURS
Status: DISCONTINUED | OUTPATIENT
Start: 2025-06-22 | End: 2025-06-22 | Stop reason: HOSPADM

## 2025-06-22 RX ORDER — ASPIRIN 81 MG/1
81 TABLET ORAL DAILY
Status: DISCONTINUED | OUTPATIENT
Start: 2025-06-22 | End: 2025-06-22 | Stop reason: HOSPADM

## 2025-06-22 RX ORDER — DIGOXIN 125 MCG
0.12 TABLET ORAL DAILY
Status: DISCONTINUED | OUTPATIENT
Start: 2025-06-22 | End: 2025-06-22 | Stop reason: HOSPADM

## 2025-06-22 RX ADMIN — HYDROMORPHONE HYDROCHLORIDE 1 MG: 1 INJECTION, SOLUTION INTRAMUSCULAR; INTRAVENOUS; SUBCUTANEOUS at 00:12

## 2025-06-22 RX ADMIN — CARVEDILOL 3.12 MG: 3.12 TABLET, FILM COATED ORAL at 09:16

## 2025-06-22 RX ADMIN — SODIUM CHLORIDE 1000 ML: 0.9 INJECTION, SOLUTION INTRAVENOUS at 00:13

## 2025-06-22 RX ADMIN — SPIRONOLACTONE 12.5 MG: 25 TABLET ORAL at 09:16

## 2025-06-22 RX ADMIN — DIGOXIN 0.12 MG: 125 TABLET ORAL at 09:17

## 2025-06-22 RX ADMIN — HYDROMORPHONE HYDROCHLORIDE 0.5 MG: 1 INJECTION, SOLUTION INTRAMUSCULAR; INTRAVENOUS; SUBCUTANEOUS at 07:54

## 2025-06-22 RX ADMIN — ASPIRIN 81 MG: 81 TABLET, COATED ORAL at 09:16

## 2025-06-22 RX ADMIN — PIPERACILLIN AND TAZOBACTAM 4500 MG: 4; .5 INJECTION, POWDER, FOR SOLUTION INTRAVENOUS at 10:43

## 2025-06-22 RX ADMIN — GADOBENATE DIMEGLUMINE 18 ML: 529 INJECTION, SOLUTION INTRAVENOUS at 04:20

## 2025-06-22 RX ADMIN — IOPAMIDOL 100 ML: 612 INJECTION, SOLUTION INTRAVENOUS at 00:38

## 2025-06-22 ASSESSMENT — PAIN - FUNCTIONAL ASSESSMENT
PAIN_FUNCTIONAL_ASSESSMENT: 0-10
PAIN_FUNCTIONAL_ASSESSMENT: 0-10

## 2025-06-22 ASSESSMENT — PAIN DESCRIPTION - LOCATION
LOCATION: ABDOMEN;BACK
LOCATION: ABDOMEN

## 2025-06-22 ASSESSMENT — PAIN SCALES - GENERAL
PAINLEVEL_OUTOF10: 9
PAINLEVEL_OUTOF10: 10
PAINLEVEL_OUTOF10: 4
PAINLEVEL_OUTOF10: 9

## 2025-06-22 ASSESSMENT — PAIN DESCRIPTION - DESCRIPTORS: DESCRIPTORS: ACHING

## 2025-06-22 ASSESSMENT — PAIN DESCRIPTION - ORIENTATION
ORIENTATION: UPPER
ORIENTATION: LEFT

## 2025-06-22 NOTE — ED TRIAGE NOTES
Pt arrives ambulatory to triage reporting abd pain, pt reports similar to pain in past from gallstones & pancreatitis   Pt reports \"I need to have the thing removed\" referring to his gallbladder

## 2025-06-22 NOTE — ED PROVIDER NOTES
Emergency Physician Note  [facility] EMERGENCY DEPARTMENT  [address] [facility] [address]  Dept: [phone]  Loc: [phone]  Open Note Time:  10:53 PM EDT     Chief Complaint   Abdominal Pain      Limitations of exam/history:  none     History of Present Illness   Reji Dunlap Sr. is a 74 y.o. male  has a past medical history of Acute ST elevation myocardial infarction (STEMI) involving right coronary artery (HCC), Atrial fibrillation (HCC), Gastrointestinal disorder, H/O cardiac catheterization, Heart failure (HCC), Hep C w/o coma, chronic (HCC), Hyperlipidemia, Liver disease, NHL (nodular histiocytic lymphoma) (HCC), Nonischemic cardiomyopathy (HCC), and STEMI (ST elevation myocardial infarction) (HCC). who presents to the ED with a chief complaint of abdominal pain.  Patient had sudden onset of abdominal pain that both right upper quadrant and left upper quadrant.  He describes it as an intense achiness.  He tried heating pad for pain control did not help.  He thinks he may be dehydrated.  He reports the pain is in the front but also radiates around both flanks.  Not associated with nausea or vomiting.  Does not drink alcohol.  Does have a past medical history of gallstones and pancreatitis secondary to gallstones.  When he was admitted last year for pancreatitis he was discharged with referral to surgery however he declined surgery because he felt better.    Nursing notes reviewed.     Medical History   I have reviewed the following from the nursing documentation:      Prior to Admission medications    Medication Sig Start Date End Date Taking? Authorizing Provider   nitroGLYCERIN (NITROSTAT) 0.4 MG SL tablet Place 1 tablet under the tongue every 5 minutes as needed for Chest pain up to max of 3 total doses. If no relief after 1 dose, call 911. 4/29/25   Willian Berry MD   rivaroxaban (XARELTO) 20 MG TABS tablet Take 1 tablet by mouth Daily with supper

## 2025-06-22 NOTE — ED PROVIDER NOTES
6:00 AM EDT: Pt care assumed from Dr. Darden, ED provider in stable condition. Pt complaint(s), current treatment plan, progression and available diagnostic results have been discussed thoroughly. The patient was seen and evaluated on my shift.     Rounding occurred: Yes  Pending workup(please list): MRCP read  Intended Disposition: To be determined    In brief: 74 y.o. male who presented to the emergency department for abdominal pain consistent with patient's previously known gallbladder and pancreatitis pathology.     ED Course as of 06/22/25 1917   Lindsay Jun 22, 2025 0219 I discussed the findings of the CT scan as well as recommendations from the GI specialist with the patient.  He is willing to stay for the MRCP and he understands that if they do find an obstructing stone that he will need to be transferred for an ERCP.  Patient at this time says his pain is controlled. [SG]   0659 MRI ABDOMEN W WO CONTRAST MRCP  MRCP results reviewed showing several stones as well as cholelithiasis.  Discussed results with patient and stated that I would reach out to gastroenterology. [CM]   0701 Gastroenterology contacted via [employer] [CM]   0709 GI recommended transfer to facility with ERCP capabilities, transfer center contacted [CM]   0628 Transfer center called back with GI from [facility] on the line they stated they would be willing to do the ERCP, however given that the patient has multiple other problems requested transfer to the hospitalist, transfer center working on getting you the hospitalist.  GI doctor said to hold Xarelto and start antibiotics. [CM]      ED Course User Index  [CM] Alexandre Vickers III, MD  [SG] Marija Darden MD      Patient transferred in hemodynamically stable condition    Alexandre Vickers III, MD   6/22/25 7:17 PM        Alexandre Vickers III, MD  06/22/25 1917

## 2025-06-23 LAB
EKG ATRIAL RATE: 55 BPM
EKG DIAGNOSIS: NORMAL
EKG Q-T INTERVAL: 400 MS
EKG QRS DURATION: 128 MS
EKG QTC CALCULATION (BAZETT): 476 MS
EKG R AXIS: -60 DEGREES
EKG T AXIS: 77 DEGREES
EKG VENTRICULAR RATE: 85 BPM

## 2025-06-23 PROCEDURE — 93010 ELECTROCARDIOGRAM REPORT: CPT | Performed by: INTERNAL MEDICINE

## (undated) DEVICE — GLIDESHEATH SLENDER STAINLESS STEEL KIT: Brand: GLIDESHEATH SLENDER

## (undated) DEVICE — PRESSURE MONITORING SET: Brand: TRUWAVE

## (undated) DEVICE — DRAPE,ANGIO,BRACH,STERILE,38X44: Brand: MEDLINE

## (undated) DEVICE — RADIFOCUS OPTITORQUE ANGIOGRAPHIC CATHETER: Brand: OPTITORQUE

## (undated) DEVICE — TR BAND RADIAL ARTERY COMPRESSION DEVICE: Brand: TR BAND

## (undated) DEVICE — PROCEDURE KIT FLUID MGMT 10 FR CUST MAINFOLD

## (undated) DEVICE — SET FLD ADMIN 3 W STPCOCK FIX FEM L BOR 1IN